# Patient Record
Sex: FEMALE | Race: BLACK OR AFRICAN AMERICAN | Employment: OTHER
[De-identification: names, ages, dates, MRNs, and addresses within clinical notes are randomized per-mention and may not be internally consistent; named-entity substitution may affect disease eponyms.]

---

## 2024-08-16 ENCOUNTER — APPOINTMENT (OUTPATIENT)
Facility: HOSPITAL | Age: 89
DRG: 669 | End: 2024-08-16
Payer: MEDICARE

## 2024-08-16 ENCOUNTER — HOSPITAL ENCOUNTER (INPATIENT)
Facility: HOSPITAL | Age: 89
LOS: 8 days | Discharge: NURSING, REHAB OR LONG-TERM CARE | DRG: 669 | End: 2024-08-26
Attending: STUDENT IN AN ORGANIZED HEALTH CARE EDUCATION/TRAINING PROGRAM | Admitting: GENERAL ACUTE CARE HOSPITAL
Payer: MEDICARE

## 2024-08-16 DIAGNOSIS — E86.0 DEHYDRATION: ICD-10-CM

## 2024-08-16 DIAGNOSIS — N30.00 ACUTE CYSTITIS WITHOUT HEMATURIA: ICD-10-CM
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
## 2024-08-16 PROBLEM — N39.0 UTI (URINARY TRACT INFECTION): Status: ACTIVE | Noted: 2024-08-16

## 2024-08-16 LAB
ALBUMIN SERPL-MCNC: 2.7 G/DL (ref 3.5–5)
ALBUMIN/GLOB SERPL: 0.8 (ref 1.1–2.2)
ALP SERPL-CCNC: 106 U/L (ref 45–117)
ALT SERPL-CCNC: 38 U/L (ref 12–78)
ANION GAP SERPL CALC-SCNC: 7 MMOL/L (ref 5–15)
APPEARANCE UR: ABNORMAL
AST SERPL-CCNC: 42 U/L (ref 15–37)
BACTERIA URNS QL MICRO: ABNORMAL /HPF
BASOPHILS # BLD: 0 K/UL (ref 0–0.1)
BASOPHILS NFR BLD: 0 % (ref 0–1)
BILIRUB SERPL-MCNC: 1 MG/DL (ref 0.2–1)
BILIRUB UR QL: NEGATIVE
BUN SERPL-MCNC: 12 MG/DL (ref 6–20)
BUN/CREAT SERPL: 17 (ref 12–20)
CALCIUM SERPL-MCNC: 8.8 MG/DL (ref 8.5–10.1)
CHLORIDE SERPL-SCNC: 102 MMOL/L (ref 97–108)
CK SERPL-CCNC: 492 U/L (ref 26–192)
CO2 SERPL-SCNC: 26 MMOL/L (ref 21–32)
COLOR UR: ABNORMAL
CREAT SERPL-MCNC: 0.72 MG/DL (ref 0.55–1.02)
DIFFERENTIAL METHOD BLD: NORMAL
EOSINOPHIL # BLD: 0.2 K/UL (ref 0–0.4)
EOSINOPHIL NFR BLD: 2 % (ref 0–7)
EPITH CASTS URNS QL MICRO: ABNORMAL /LPF
ERYTHROCYTE [DISTWIDTH] IN BLOOD BY AUTOMATED COUNT: 14.1 % (ref 11.5–14.5)
GLOBULIN SER CALC-MCNC: 3.4 G/DL (ref 2–4)
GLUCOSE SERPL-MCNC: 102 MG/DL (ref 65–100)
GLUCOSE UR STRIP.AUTO-MCNC: NEGATIVE MG/DL
HCT VFR BLD AUTO: 41.2 % (ref 35–47)
HGB BLD-MCNC: 13.5 G/DL (ref 11.5–16)
HGB UR QL STRIP: ABNORMAL
IMM GRANULOCYTES # BLD AUTO: 0 K/UL (ref 0–0.04)
IMM GRANULOCYTES NFR BLD AUTO: 0 % (ref 0–0.5)
KETONES UR QL STRIP.AUTO: NEGATIVE MG/DL
LEUKOCYTE ESTERASE UR QL STRIP.AUTO: ABNORMAL
LYMPHOCYTES # BLD: 1.3 K/UL (ref 0.8–3.5)
LYMPHOCYTES NFR BLD: 13 % (ref 12–49)
MAGNESIUM SERPL-MCNC: 2.4 MG/DL (ref 1.6–2.4)
MCH RBC QN AUTO: 28.9 PG (ref 26–34)
MCHC RBC AUTO-ENTMCNC: 32.8 G/DL (ref 30–36.5)
MCV RBC AUTO: 88.2 FL (ref 80–99)
MONOCYTES # BLD: 1 K/UL (ref 0–1)
MONOCYTES NFR BLD: 11 % (ref 5–13)
NEUTS SEG # BLD: 7.2 K/UL (ref 1.8–8)
NEUTS SEG NFR BLD: 74 % (ref 32–75)
NITRITE UR QL STRIP.AUTO: POSITIVE
NRBC # BLD: 0 K/UL (ref 0–0.01)
NRBC BLD-RTO: 0 PER 100 WBC
PH UR STRIP: 5.5 (ref 5–8)
PLATELET # BLD AUTO: 233 K/UL (ref 150–400)
PMV BLD AUTO: 9 FL (ref 8.9–12.9)
POTASSIUM SERPL-SCNC: 3.9 MMOL/L (ref 3.5–5.1)
PROT SERPL-MCNC: 6.1 G/DL (ref 6.4–8.2)
PROT UR STRIP-MCNC: 30 MG/DL
RBC # BLD AUTO: 4.67 M/UL (ref 3.8–5.2)
RBC #/AREA URNS HPF: ABNORMAL /HPF (ref 0–5)
SODIUM SERPL-SCNC: 135 MMOL/L (ref 136–145)
SP GR UR REFRACTOMETRY: 1.01
URINE CULTURE IF INDICATED: ABNORMAL
UROBILINOGEN UR QL STRIP.AUTO: 1 EU/DL (ref 0.2–1)
WBC # BLD AUTO: 9.8 K/UL (ref 3.6–11)
WBC URNS QL MICRO: ABNORMAL /HPF (ref 0–4)

## 2024-08-16 PROCEDURE — 2580000003 HC RX 258: Performed by: INTERNAL MEDICINE

## 2024-08-16 PROCEDURE — 85025 COMPLETE CBC W/AUTO DIFF WBC: CPT

## 2024-08-16 PROCEDURE — 87186 SC STD MICRODIL/AGAR DIL: CPT

## 2024-08-16 PROCEDURE — 2580000003 HC RX 258: Performed by: STUDENT IN AN ORGANIZED HEALTH CARE EDUCATION/TRAINING PROGRAM

## 2024-08-16 PROCEDURE — 87086 URINE CULTURE/COLONY COUNT: CPT

## 2024-08-16 PROCEDURE — 6360000002 HC RX W HCPCS: Performed by: INTERNAL MEDICINE

## 2024-08-16 PROCEDURE — 96372 THER/PROPH/DIAG INJ SC/IM: CPT

## 2024-08-16 PROCEDURE — 83735 ASSAY OF MAGNESIUM: CPT

## 2024-08-16 PROCEDURE — 80053 COMPREHEN METABOLIC PANEL: CPT

## 2024-08-16 PROCEDURE — 6370000000 HC RX 637 (ALT 250 FOR IP): Performed by: INTERNAL MEDICINE

## 2024-08-16 PROCEDURE — 96365 THER/PROPH/DIAG IV INF INIT: CPT

## 2024-08-16 PROCEDURE — 96361 HYDRATE IV INFUSION ADD-ON: CPT

## 2024-08-16 PROCEDURE — G0378 HOSPITAL OBSERVATION PER HR: HCPCS

## 2024-08-16 PROCEDURE — 87088 URINE BACTERIA CULTURE: CPT

## 2024-08-16 PROCEDURE — 81001 URINALYSIS AUTO W/SCOPE: CPT

## 2024-08-16 PROCEDURE — 71045 X-RAY EXAM CHEST 1 VIEW: CPT

## 2024-08-16 PROCEDURE — 70450 CT HEAD/BRAIN W/O DYE: CPT

## 2024-08-16 PROCEDURE — 99285 EMERGENCY DEPT VISIT HI MDM: CPT

## 2024-08-16 PROCEDURE — 96366 THER/PROPH/DIAG IV INF ADDON: CPT

## 2024-08-16 PROCEDURE — 6360000002 HC RX W HCPCS: Performed by: STUDENT IN AN ORGANIZED HEALTH CARE EDUCATION/TRAINING PROGRAM

## 2024-08-16 PROCEDURE — 82550 ASSAY OF CK (CPK): CPT

## 2024-08-16 PROCEDURE — 36415 COLL VENOUS BLD VENIPUNCTURE: CPT

## 2024-08-16 RX ORDER — SODIUM CHLORIDE 9 MG/ML
INJECTION, SOLUTION INTRAVENOUS PRN
Status: DISCONTINUED | OUTPATIENT
Start: 2024-08-16 | End: 2024-08-26 | Stop reason: HOSPADM

## 2024-08-16 RX ORDER — ACETAMINOPHEN 325 MG/1
650 TABLET ORAL EVERY 4 HOURS PRN
Status: DISCONTINUED | OUTPATIENT
Start: 2024-08-16 | End: 2024-08-16

## 2024-08-16 RX ORDER — ENOXAPARIN SODIUM 100 MG/ML
40 INJECTION SUBCUTANEOUS DAILY
Status: DISCONTINUED | OUTPATIENT
Start: 2024-08-16 | End: 2024-08-26 | Stop reason: HOSPADM

## 2024-08-16 RX ORDER — CEFDINIR 300 MG/1
300 CAPSULE ORAL 2 TIMES DAILY
Qty: 14 CAPSULE | Refills: 0 | Status: SHIPPED | OUTPATIENT
Start: 2024-08-16 | End: 2024-08-26 | Stop reason: HOSPADM

## 2024-08-16 RX ORDER — ONDANSETRON 2 MG/ML
4 INJECTION INTRAMUSCULAR; INTRAVENOUS EVERY 6 HOURS PRN
Status: DISCONTINUED | OUTPATIENT
Start: 2024-08-16 | End: 2024-08-26 | Stop reason: HOSPADM

## 2024-08-16 RX ORDER — ONDANSETRON 2 MG/ML
4 INJECTION INTRAMUSCULAR; INTRAVENOUS EVERY 4 HOURS PRN
Status: DISCONTINUED | OUTPATIENT
Start: 2024-08-16 | End: 2024-08-16

## 2024-08-16 RX ORDER — POLYETHYLENE GLYCOL 3350 17 G/17G
17 POWDER, FOR SOLUTION ORAL DAILY PRN
Status: DISCONTINUED | OUTPATIENT
Start: 2024-08-16 | End: 2024-08-26 | Stop reason: HOSPADM

## 2024-08-16 RX ORDER — ONDANSETRON 4 MG/1
4 TABLET, ORALLY DISINTEGRATING ORAL EVERY 8 HOURS PRN
Status: DISCONTINUED | OUTPATIENT
Start: 2024-08-16 | End: 2024-08-26 | Stop reason: HOSPADM

## 2024-08-16 RX ORDER — 0.9 % SODIUM CHLORIDE 0.9 %
1000 INTRAVENOUS SOLUTION INTRAVENOUS ONCE
Status: COMPLETED | OUTPATIENT
Start: 2024-08-16 | End: 2024-08-16

## 2024-08-16 RX ORDER — SODIUM CHLORIDE 0.9 % (FLUSH) 0.9 %
5-40 SYRINGE (ML) INJECTION EVERY 12 HOURS SCHEDULED
Status: DISCONTINUED | OUTPATIENT
Start: 2024-08-16 | End: 2024-08-26 | Stop reason: HOSPADM

## 2024-08-16 RX ORDER — ACETAMINOPHEN 325 MG/1
650 TABLET ORAL EVERY 6 HOURS PRN
Status: DISCONTINUED | OUTPATIENT
Start: 2024-08-16 | End: 2024-08-26 | Stop reason: HOSPADM

## 2024-08-16 RX ORDER — ACETAMINOPHEN 650 MG/1
650 SUPPOSITORY RECTAL EVERY 6 HOURS PRN
Status: DISCONTINUED | OUTPATIENT
Start: 2024-08-16 | End: 2024-08-26 | Stop reason: HOSPADM

## 2024-08-16 RX ORDER — SODIUM CHLORIDE 9 MG/ML
INJECTION, SOLUTION INTRAVENOUS CONTINUOUS
Status: DISCONTINUED | OUTPATIENT
Start: 2024-08-16 | End: 2024-08-20

## 2024-08-16 RX ORDER — SODIUM CHLORIDE 9 MG/ML
INJECTION, SOLUTION INTRAVENOUS CONTINUOUS
Status: DISCONTINUED | OUTPATIENT
Start: 2024-08-16 | End: 2024-08-16

## 2024-08-16 RX ORDER — LISINOPRIL 10 MG/1
10 TABLET ORAL DAILY
COMMUNITY

## 2024-08-16 RX ORDER — SODIUM CHLORIDE 0.9 % (FLUSH) 0.9 %
5-40 SYRINGE (ML) INJECTION PRN
Status: DISCONTINUED | OUTPATIENT
Start: 2024-08-16 | End: 2024-08-26 | Stop reason: HOSPADM

## 2024-08-16 RX ORDER — LISINOPRIL 5 MG/1
10 TABLET ORAL DAILY
Status: DISCONTINUED | OUTPATIENT
Start: 2024-08-16 | End: 2024-08-25

## 2024-08-16 RX ADMIN — SODIUM CHLORIDE 1000 ML: 9 INJECTION, SOLUTION INTRAVENOUS at 14:54

## 2024-08-16 RX ADMIN — CEFTRIAXONE 1000 MG: 1 INJECTION, POWDER, FOR SOLUTION INTRAMUSCULAR; INTRAVENOUS at 17:30

## 2024-08-16 RX ADMIN — SODIUM CHLORIDE: 9 INJECTION, SOLUTION INTRAVENOUS at 20:17

## 2024-08-16 RX ADMIN — SODIUM CHLORIDE, PRESERVATIVE FREE 10 ML: 5 INJECTION INTRAVENOUS at 22:38

## 2024-08-16 RX ADMIN — LISINOPRIL 10 MG: 5 TABLET ORAL at 20:17

## 2024-08-16 RX ADMIN — ENOXAPARIN SODIUM 40 MG: 100 INJECTION SUBCUTANEOUS at 20:18

## 2024-08-16 ASSESSMENT — PAIN - FUNCTIONAL ASSESSMENT: PAIN_FUNCTIONAL_ASSESSMENT: NONE - DENIES PAIN

## 2024-08-16 ASSESSMENT — LIFESTYLE VARIABLES
HOW OFTEN DO YOU HAVE A DRINK CONTAINING ALCOHOL: 4 OR MORE TIMES A WEEK
HOW MANY STANDARD DRINKS CONTAINING ALCOHOL DO YOU HAVE ON A TYPICAL DAY: 3 OR 4

## 2024-08-16 NOTE — ED PROVIDER NOTES
Leukocyte Esterase, Urine MODERATE (A) NEG      WBC, UA 20-50 0 - 4 /hpf    RBC, UA 10-20 0 - 5 /hpf    Epithelial Cells, UA MODERATE (A) FEW /lpf    BACTERIA, URINE 4+ (A) NEG /hpf    Urine Culture if Indicated URINE CULTURE ORDERED (A) CNI          RADIOLOGY:  Non-plain film images such as CT, Ultrasound and MRI are read by the radiologist. Plain radiographic images are visualized and preliminarily interpreted by the ED Provider with the below findings:        Interpretation per the Radiologist below, if available at the time of this note:     XR CHEST PORTABLE   Final Result   1. The lungs are clear      Electronically signed by YANELIS BILLY      CT Head W/O Contrast   Final Result   No evidence of acute intracranial abnormality.            Electronically signed by HUNTER WOLFE                   EMERGENCY DEPARTMENT COURSE and DIFFERENTIAL DIAGNOSIS/MDM   Vitals:    Vitals:    08/16/24 1645 08/16/24 1700 08/16/24 1715 08/16/24 1730   BP: 125/62 124/64 124/60 (!) 164/78   Pulse:       Resp:       Temp:       TempSrc:       SpO2: 99% 98% 95% 99%   Weight:       Height:                Patient was given the following medications:  Medications   0.9 % sodium chloride infusion (has no administration in time range)   acetaminophen (TYLENOL) tablet 650 mg (has no administration in time range)   ondansetron (ZOFRAN) injection 4 mg (has no administration in time range)   sodium chloride 0.9 % bolus 1,000 mL (0 mLs IntraVENous Stopped 8/16/24 1619)   cefTRIAXone (ROCEPHIN) 1,000 mg in sodium chloride 0.9 % 50 mL IVPB (mini-bag) (1,000 mg IntraVENous New Bag 8/16/24 1730)       CONSULTS: (Who and What was discussed)  None      Chronic Conditions: Hypertension    Records Reviewed (source and summary of external notes): Nursing Notes    CC/HPI Summary, DDx, ED Course, and Reassessment:   Mdm  89-year-old female who presents for evaluation of fall, inability to stand, generalized weakness.  Presents by EMS from home.

## 2024-08-16 NOTE — ED NOTES
Report received from KARIME Barry. Reviewed reason for patient arrival, vitals, labs, medications, orders, procedures, results, pending orders/results and current plan for disposition. Questions were asked and answered prior to departure.

## 2024-08-16 NOTE — H&P
Hospitalist Admission Note    NAME:   Ronna Arzola   : 1935   MRN: [id number]     Date/Time: 2024 6:44 PM    Patient PCP: Carmen Aguero MD    ______________________________________________________________________  Given the patient's current clinical presentation, I have a high level of concern for decompensation if discharged from the emergency department.  Complex decision making was performed, which includes reviewing the patient's available past medical records, laboratory results, and x-ray films.       My assessment of this patient's clinical condition and my plan of care is as follows.    Assessment / Plan:      UTI  -Urine analysis is normal.  Send urine culture.  Start ceftriaxone.  Adjust antibiotics based on urine culture results.    Acute Rhabdomyolysis  Gen Weakness  -Start IV fluids.  Recheck CK.  Check TSH, vitamin B12 level as well  -Consult PT OT  -CT head shows no acute process    Hypertension  -Continue lisinopril      Medical Decision Making:   I personally reviewed labs: CBC, BMP  I personally reviewed imaging: CT head  I personally reviewed EKG:  Toxic drug monitoring:   Discussed case with: ED provider. After discussion I am in agreement that acuity of patient's medical condition necessitates hospital stay.      Code Status: Full code  DVT Prophylaxis: Lovenox  Baseline:     Subjective:   CHIEF COMPLAINT: Generalized weakness    HISTORY OF PRESENT ILLNESS:     Ronna Arzola is a 89 y.o.  female with PMHx significant for hypertension is coming the hospital chief complaints of generalized weakness.  Patient reports being in her usual health until about a week ago when she started having some generalized weakness along with difficulty ambulating.  Does not report any chest pain or shortness of breath.  Denies any abdominal pain, nausea vomiting.  No fever or chills.    On arrival to ED, she was noted to have stable vitals but was noted to have abnormal urinalysis and was  subcortical white matter hypodensity consistent with chronic small vessel ischemic disease. There is no intracranial hemorrhage, extra-axial collection, or mass effect. The basilar cisterns are open. No CT evidence of acute infarct. The bone windows demonstrate no abnormalities. The visualized portions of the paranasal sinuses and mastoid air cells are clear.     No evidence of acute intracranial abnormality. Electronically signed by HUNTER WOLFE     _______________________________________________________________________    TOTAL TIME:  76 Minutes    Critical Care Provided     Minutes non procedure based    Signed: Wilmer Lagunas MD    Procedures: see electronic medical records for all procedures/Xrays and details which were not copied into this note but were reviewed prior to creation of Plan.

## 2024-08-17 LAB
25(OH)D3 SERPL-MCNC: <9 NG/ML (ref 30–100)
ANION GAP SERPL CALC-SCNC: 6 MMOL/L (ref 5–15)
BASOPHILS # BLD: 0.1 K/UL (ref 0–0.1)
BASOPHILS NFR BLD: 1 % (ref 0–1)
BUN SERPL-MCNC: 10 MG/DL (ref 6–20)
BUN/CREAT SERPL: 16 (ref 12–20)
CALCIUM SERPL-MCNC: 8.3 MG/DL (ref 8.5–10.1)
CHLORIDE SERPL-SCNC: 109 MMOL/L (ref 97–108)
CO2 SERPL-SCNC: 23 MMOL/L (ref 21–32)
CREAT SERPL-MCNC: 0.63 MG/DL (ref 0.55–1.02)
DIFFERENTIAL METHOD BLD: ABNORMAL
EOSINOPHIL # BLD: 0.3 K/UL (ref 0–0.4)
EOSINOPHIL NFR BLD: 5 % (ref 0–7)
ERYTHROCYTE [DISTWIDTH] IN BLOOD BY AUTOMATED COUNT: 14 % (ref 11.5–14.5)
GLUCOSE SERPL-MCNC: 88 MG/DL (ref 65–100)
HCT VFR BLD AUTO: 41.5 % (ref 35–47)
HGB BLD-MCNC: 13.4 G/DL (ref 11.5–16)
IMM GRANULOCYTES # BLD AUTO: 0 K/UL (ref 0–0.04)
IMM GRANULOCYTES NFR BLD AUTO: 1 % (ref 0–0.5)
LYMPHOCYTES # BLD: 1.5 K/UL (ref 0.8–3.5)
LYMPHOCYTES NFR BLD: 20 % (ref 12–49)
MAGNESIUM SERPL-MCNC: 2.4 MG/DL (ref 1.6–2.4)
MCH RBC QN AUTO: 29 PG (ref 26–34)
MCHC RBC AUTO-ENTMCNC: 32.3 G/DL (ref 30–36.5)
MCV RBC AUTO: 89.8 FL (ref 80–99)
MONOCYTES # BLD: 0.9 K/UL (ref 0–1)
MONOCYTES NFR BLD: 12 % (ref 5–13)
NEUTS SEG # BLD: 4.7 K/UL (ref 1.8–8)
NEUTS SEG NFR BLD: 61 % (ref 32–75)
NRBC # BLD: 0 K/UL (ref 0–0.01)
NRBC BLD-RTO: 0 PER 100 WBC
PLATELET # BLD AUTO: 214 K/UL (ref 150–400)
PMV BLD AUTO: 9.5 FL (ref 8.9–12.9)
POTASSIUM SERPL-SCNC: 3.5 MMOL/L (ref 3.5–5.1)
RBC # BLD AUTO: 4.62 M/UL (ref 3.8–5.2)
SODIUM SERPL-SCNC: 138 MMOL/L (ref 136–145)
TSH SERPL DL<=0.05 MIU/L-ACNC: 0.77 UIU/ML (ref 0.36–3.74)
VIT B12 SERPL-MCNC: 184 PG/ML (ref 193–986)
WBC # BLD AUTO: 7.5 K/UL (ref 3.6–11)

## 2024-08-17 PROCEDURE — 82607 VITAMIN B-12: CPT

## 2024-08-17 PROCEDURE — 82306 VITAMIN D 25 HYDROXY: CPT

## 2024-08-17 PROCEDURE — 2580000003 HC RX 258: Performed by: INTERNAL MEDICINE

## 2024-08-17 PROCEDURE — 96372 THER/PROPH/DIAG INJ SC/IM: CPT

## 2024-08-17 PROCEDURE — 84443 ASSAY THYROID STIM HORMONE: CPT

## 2024-08-17 PROCEDURE — 85025 COMPLETE CBC W/AUTO DIFF WBC: CPT

## 2024-08-17 PROCEDURE — 6370000000 HC RX 637 (ALT 250 FOR IP): Performed by: INTERNAL MEDICINE

## 2024-08-17 PROCEDURE — 96366 THER/PROPH/DIAG IV INF ADDON: CPT

## 2024-08-17 PROCEDURE — 6360000002 HC RX W HCPCS: Performed by: INTERNAL MEDICINE

## 2024-08-17 PROCEDURE — 96361 HYDRATE IV INFUSION ADD-ON: CPT

## 2024-08-17 PROCEDURE — 83735 ASSAY OF MAGNESIUM: CPT

## 2024-08-17 PROCEDURE — 80048 BASIC METABOLIC PNL TOTAL CA: CPT

## 2024-08-17 PROCEDURE — 36415 COLL VENOUS BLD VENIPUNCTURE: CPT

## 2024-08-17 PROCEDURE — G0378 HOSPITAL OBSERVATION PER HR: HCPCS

## 2024-08-17 RX ORDER — VITAMIN B COMPLEX
5000 TABLET ORAL DAILY
Status: DISCONTINUED | OUTPATIENT
Start: 2024-08-18 | End: 2024-08-26 | Stop reason: HOSPADM

## 2024-08-17 RX ORDER — UREA 10 %
1000 LOTION (ML) TOPICAL DAILY
Status: DISCONTINUED | OUTPATIENT
Start: 2024-08-17 | End: 2024-08-18

## 2024-08-17 RX ADMIN — SODIUM CHLORIDE, PRESERVATIVE FREE 10 ML: 5 INJECTION INTRAVENOUS at 08:55

## 2024-08-17 RX ADMIN — SODIUM CHLORIDE, PRESERVATIVE FREE 10 ML: 5 INJECTION INTRAVENOUS at 22:32

## 2024-08-17 RX ADMIN — LISINOPRIL 10 MG: 5 TABLET ORAL at 08:51

## 2024-08-17 RX ADMIN — SODIUM CHLORIDE 1000 MG: 900 INJECTION INTRAVENOUS at 00:14

## 2024-08-17 RX ADMIN — SODIUM CHLORIDE: 9 INJECTION, SOLUTION INTRAVENOUS at 19:27

## 2024-08-17 RX ADMIN — ENOXAPARIN SODIUM 40 MG: 100 INJECTION SUBCUTANEOUS at 08:50

## 2024-08-17 RX ADMIN — CYANOCOBALAMIN TAB 500 MCG 1000 MCG: 500 TAB at 11:15

## 2024-08-17 NOTE — PLAN OF CARE
Problem: Safety - Adult  Goal: Free from fall injury  8/17/2024 1051 by Samantha Jain RN  Outcome: Progressing  8/17/2024 0208 by Gabby Quarles RN  Outcome: Progressing     Problem: Discharge Planning  Goal: Discharge to home or other facility with appropriate resources  8/17/2024 1051 by Samantha Jain RN  Outcome: Progressing  8/17/2024 0208 by Gabby Quarles RN  Outcome: Progressing     Problem: ABCDS Injury Assessment  Goal: Absence of physical injury  Outcome: Progressing

## 2024-08-17 NOTE — PROGRESS NOTES
Physical Therapy  Orders received and acknowledged.  Medical record reviewed.  Will defer at this time;  there is no H&P in the chart.  Will continue to follow as appropriate.

## 2024-08-17 NOTE — PROGRESS NOTES
Patient told us she \"sneezed and blood came out\" Assessed medium amount of bright red blood on the floor. Pt denies dizziness and pain. Assessed pt's rectum and perineum. Noticed blood on inner thighs. Cleaned up pt. MD notified. Educated pt to let us know if this happens again.

## 2024-08-17 NOTE — PROGRESS NOTES
Occupational Therapy  Orders received and acknowledged.  Medical record reviewed.  Will defer at this time;  there is no H&P in the chart.  Will continue to follow as appropriate.

## 2024-08-17 NOTE — ED NOTES
Report given to KARIME Pierre. Nurse was informed of reason for arrival, vitals, labs, medications, orders, procedures, results, anything left pending and current plan of action. Questions were asked and received prior to departure from the patient.

## 2024-08-18 ENCOUNTER — APPOINTMENT (OUTPATIENT)
Facility: HOSPITAL | Age: 89
DRG: 669 | End: 2024-08-18
Payer: MEDICARE

## 2024-08-18 PROBLEM — E53.8 B12 DEFICIENCY: Status: ACTIVE | Noted: 2024-08-18

## 2024-08-18 LAB
ANION GAP SERPL CALC-SCNC: 6 MMOL/L (ref 5–15)
BACTERIA SPEC CULT: ABNORMAL
BACTERIA SPEC CULT: ABNORMAL
BASOPHILS # BLD: 0 K/UL (ref 0–0.1)
BASOPHILS NFR BLD: 0 % (ref 0–1)
BUN SERPL-MCNC: 6 MG/DL (ref 6–20)
BUN/CREAT SERPL: 11 (ref 12–20)
CALCIUM SERPL-MCNC: 8.1 MG/DL (ref 8.5–10.1)
CC UR VC: ABNORMAL
CHLORIDE SERPL-SCNC: 109 MMOL/L (ref 97–108)
CK SERPL-CCNC: 153 U/L (ref 26–192)
CO2 SERPL-SCNC: 26 MMOL/L (ref 21–32)
CREAT SERPL-MCNC: 0.57 MG/DL (ref 0.55–1.02)
DIFFERENTIAL METHOD BLD: ABNORMAL
EOSINOPHIL # BLD: 0.2 K/UL (ref 0–0.4)
EOSINOPHIL NFR BLD: 3 % (ref 0–7)
ERYTHROCYTE [DISTWIDTH] IN BLOOD BY AUTOMATED COUNT: 14.2 % (ref 11.5–14.5)
FOLATE SERPL-MCNC: 7.9 NG/ML (ref 5–21)
GLUCOSE BLD STRIP.AUTO-MCNC: 138 MG/DL (ref 65–117)
GLUCOSE SERPL-MCNC: 114 MG/DL (ref 65–100)
HCT VFR BLD AUTO: 39 % (ref 35–47)
HGB BLD-MCNC: 12.3 G/DL (ref 11.5–16)
IMM GRANULOCYTES # BLD AUTO: 0 K/UL (ref 0–0.04)
IMM GRANULOCYTES NFR BLD AUTO: 1 % (ref 0–0.5)
LYMPHOCYTES # BLD: 1.4 K/UL (ref 0.8–3.5)
LYMPHOCYTES NFR BLD: 19 % (ref 12–49)
MCH RBC QN AUTO: 28.7 PG (ref 26–34)
MCHC RBC AUTO-ENTMCNC: 31.5 G/DL (ref 30–36.5)
MCV RBC AUTO: 90.9 FL (ref 80–99)
MONOCYTES # BLD: 1 K/UL (ref 0–1)
MONOCYTES NFR BLD: 14 % (ref 5–13)
NEUTS SEG # BLD: 4.5 K/UL (ref 1.8–8)
NEUTS SEG NFR BLD: 63 % (ref 32–75)
NRBC # BLD: 0 K/UL (ref 0–0.01)
NRBC BLD-RTO: 0 PER 100 WBC
PLATELET # BLD AUTO: 222 K/UL (ref 150–400)
PMV BLD AUTO: 9.5 FL (ref 8.9–12.9)
POTASSIUM SERPL-SCNC: 3.4 MMOL/L (ref 3.5–5.1)
RBC # BLD AUTO: 4.29 M/UL (ref 3.8–5.2)
SERVICE CMNT-IMP: ABNORMAL
SERVICE CMNT-IMP: ABNORMAL
SODIUM SERPL-SCNC: 141 MMOL/L (ref 136–145)
WBC # BLD AUTO: 7.1 K/UL (ref 3.6–11)

## 2024-08-18 PROCEDURE — 6370000000 HC RX 637 (ALT 250 FOR IP): Performed by: INTERNAL MEDICINE

## 2024-08-18 PROCEDURE — 82550 ASSAY OF CK (CPK): CPT

## 2024-08-18 PROCEDURE — 96361 HYDRATE IV INFUSION ADD-ON: CPT

## 2024-08-18 PROCEDURE — 74178 CT ABD&PLV WO CNTR FLWD CNTR: CPT

## 2024-08-18 PROCEDURE — 96372 THER/PROPH/DIAG INJ SC/IM: CPT

## 2024-08-18 PROCEDURE — 6360000002 HC RX W HCPCS: Performed by: INTERNAL MEDICINE

## 2024-08-18 PROCEDURE — 80048 BASIC METABOLIC PNL TOTAL CA: CPT

## 2024-08-18 PROCEDURE — 6370000000 HC RX 637 (ALT 250 FOR IP): Performed by: GENERAL ACUTE CARE HOSPITAL

## 2024-08-18 PROCEDURE — 36415 COLL VENOUS BLD VENIPUNCTURE: CPT

## 2024-08-18 PROCEDURE — G0378 HOSPITAL OBSERVATION PER HR: HCPCS

## 2024-08-18 PROCEDURE — 82746 ASSAY OF FOLIC ACID SERUM: CPT

## 2024-08-18 PROCEDURE — 1100000000 HC RM PRIVATE

## 2024-08-18 PROCEDURE — 82962 GLUCOSE BLOOD TEST: CPT

## 2024-08-18 PROCEDURE — 97116 GAIT TRAINING THERAPY: CPT

## 2024-08-18 PROCEDURE — 2580000003 HC RX 258: Performed by: INTERNAL MEDICINE

## 2024-08-18 PROCEDURE — 97165 OT EVAL LOW COMPLEX 30 MIN: CPT

## 2024-08-18 PROCEDURE — 96366 THER/PROPH/DIAG IV INF ADDON: CPT

## 2024-08-18 PROCEDURE — 97535 SELF CARE MNGMENT TRAINING: CPT

## 2024-08-18 PROCEDURE — 6360000004 HC RX CONTRAST MEDICATION: Performed by: GENERAL ACUTE CARE HOSPITAL

## 2024-08-18 PROCEDURE — 85025 COMPLETE CBC W/AUTO DIFF WBC: CPT

## 2024-08-18 PROCEDURE — 97161 PT EVAL LOW COMPLEX 20 MIN: CPT

## 2024-08-18 RX ORDER — POTASSIUM CHLORIDE 750 MG/1
40 TABLET, EXTENDED RELEASE ORAL ONCE
Status: COMPLETED | OUTPATIENT
Start: 2024-08-18 | End: 2024-08-18

## 2024-08-18 RX ORDER — UREA 10 %
1000 LOTION (ML) TOPICAL DAILY
Status: DISCONTINUED | OUTPATIENT
Start: 2024-08-22 | End: 2024-08-26 | Stop reason: HOSPADM

## 2024-08-18 RX ORDER — CYANOCOBALAMIN 1000 UG/ML
1000 INJECTION, SOLUTION INTRAMUSCULAR; SUBCUTANEOUS DAILY
Status: COMPLETED | OUTPATIENT
Start: 2024-08-19 | End: 2024-08-21

## 2024-08-18 RX ORDER — IOPAMIDOL 755 MG/ML
100 INJECTION, SOLUTION INTRAVASCULAR
Status: COMPLETED | OUTPATIENT
Start: 2024-08-18 | End: 2024-08-18

## 2024-08-18 RX ADMIN — SODIUM CHLORIDE 1000 MG: 900 INJECTION INTRAVENOUS at 00:30

## 2024-08-18 RX ADMIN — SODIUM CHLORIDE: 9 INJECTION, SOLUTION INTRAVENOUS at 04:38

## 2024-08-18 RX ADMIN — CYANOCOBALAMIN TAB 500 MCG 1000 MCG: 500 TAB at 09:38

## 2024-08-18 RX ADMIN — SODIUM CHLORIDE, PRESERVATIVE FREE 10 ML: 5 INJECTION INTRAVENOUS at 22:05

## 2024-08-18 RX ADMIN — Medication 5000 UNITS: at 09:43

## 2024-08-18 RX ADMIN — LISINOPRIL 10 MG: 5 TABLET ORAL at 09:38

## 2024-08-18 RX ADMIN — IOPAMIDOL 100 ML: 755 INJECTION, SOLUTION INTRAVENOUS at 11:31

## 2024-08-18 RX ADMIN — SODIUM CHLORIDE, PRESERVATIVE FREE 10 ML: 5 INJECTION INTRAVENOUS at 09:52

## 2024-08-18 RX ADMIN — ENOXAPARIN SODIUM 40 MG: 100 INJECTION SUBCUTANEOUS at 09:38

## 2024-08-18 RX ADMIN — POTASSIUM CHLORIDE 40 MEQ: 750 TABLET, FILM COATED, EXTENDED RELEASE ORAL at 12:50

## 2024-08-18 NOTE — PLAN OF CARE
Index. [facility] Med J 142.  -RACQUEL Juarez, SHANEL Jack (1997). The Barthel activities of daily living index: self-reporting versus actual performance in the old (> or = 75 years). Journal of American Geriatric Society 45(7), 832-836.   -DEREK Martinez, CIRA Anna., ONELIA Iyer., Francisco, EZEQUIEL. (1999). Measuring the change in disability after inpatient rehabilitation; comparison of the responsiveness of the Barthel Index and Functional Champion Measure. Journal of Neurology, Neurosurgery, and Psychiatry, 66(4), 480-484.  -TESFAYE Rizo, CONSTANTINO Liu, & Phi MBREANA. (2004) Assessment of post-stroke quality of life in cost-effectiveness studies: The usefulness of the Barthel Index and the EuroQoL-5D. Quality of Life Research, 13, 427-43                                                                                                                                                                                                                                 Activity Tolerance:   Good, Fair , and requires rest breaks    After treatment:   Patient left in no apparent distress sitting up in chair, Call bell within reach, and Bed/ chair alarm activated    COMMUNICATION/EDUCATION:   The patient's plan of care was discussed with: physical therapist, registered nurse, and     Patient Education  Education Given To: Patient  Education Provided: Role of Therapy;Plan of Care;Precautions;ADL Adaptive Strategies;Transfer Training;Energy Conservation  Education Method: Demonstration;Verbal  Barriers to Learning: None  Education Outcome: Verbalized understanding;Demonstrated understanding    Thank you for this referral.  Matthew Kerley, OT  Minutes: 19    Occupational Therapy Evaluation Charge Determination   History Examination Decision-Making   LOW Complexity : Brief history review  LOW Complexity: 1-3 Performance deficits relating to physical, cognitive, or psychosocial skills that result

## 2024-08-18 NOTE — CONSULTS
[facility]              [address] [place] [address]                              CONSULTATION      PATIENT NAME: BRADY INFANTE               : 1935  MED REC NO: [id number]                       ROOM: 3219  ACCOUNT NO: [id number]                       ADMIT DATE: 2024  PROVIDER: Dixon Byrne MD    DATE OF SERVICE:  2024    ATTENDING PHYSICIAN:  BEAU STOCK    REASON FOR CONSULTATION:  Gross hematuria, UTI.    HISTORY OF PRESENT ILLNESS:  The patient is an 89-year-old female, admitted to the hospital with symptomatic UTIs, weakness, .  She said she had fallen.  Urine culture is pending, growing out gram-negative rods.  Since being admitted, she has developed gross hematuria.  She is having some mild dysuria.  No flank pain, nausea, or vomiting.  She has been afebrile.  Vital signs are unremarkable, though the blood pressure still on the slightly low side.  White blood cell count normal at 7, creatinine normal at 0.57.  She had a CT of her head that was unremarkable.  Chest x-ray is unremarkable.  She has not had any abdominal imaging done.  On questioning, the patient states that she had gross hematuria a few months ago.  I have been asked to see for the above.    PAST MEDICAL HISTORY:  Hypertension.    PAST SURGICAL HISTORY:  She reports none.    ALLERGIES:  LATEX.      FAMILY HISTORY:  Unremarkable.    SOCIAL HISTORY:  She does not smoke.    MEDICATIONS:  At present are:  1. Tylenol.  2. Ceftriaxone.  3. Lovenox.  4. Zofran.  5. MiraLAX.  6. Vitamin B12.    PHYSICAL EXAMINATION:  GENERAL:  She is alert and oriented female, sitting in her chair.  HEENT:  Unremarkable.  LUNGS:  No labored breathing.  ABDOMEN:  Soft, nontender.  BACK:  No CVA tenderness.    ASSESSMENT:  Gross hematuria.  Seems like it may have predated her urinary tract infection.  I will go ahead and get her CT IVP and follow up on that and then determine next step.

## 2024-08-18 NOTE — PROGRESS NOTES
Hospitalist Progress Note    NAME:   Ronna Arzola   : 1935   MRN: [id number]     Date/Time: 2024 12:34 PM  Patient PCP: Carmen Aguero MD    Assessment / Plan:  UTI  Hematuria, resolved    -Ddysuria and UA + for Nitrate/LE. U Cx w GNR  Continue ceftriaxone  -F/up CT abd     Elevated CK level   Gen Weakness  -Off IV fluids.     -TSH is within normal limits. CK back to normal  -Consult PT OT.    Vitamin B12 deficiency  Generalized weakness   -Start vitamin B12 supplementation  -check folic acid level    Vitamin D deficiency  -Will add vitamin D supplementation    Hypertension  -Continue lisinopril    Pt request podiatry consult for long toenails     Medical Decision Making:   I personally reviewed labs: CBC, BMP  I personally reviewed imaging:  I personally reviewed EKG:  Toxic drug monitoring:   Discussed case with: pt, RN, family     Code Status: Full code  DVT Prophylaxis: scds  GI Prophylaxis:    Subjective:     Chief Complaint / Reason for Physician Visit  No abdominal pain, nausea or vomiting  Hematuria resolved  Still reports some generalized weakness  Overnight events noted no fever      Objective:     VITALS:   Last 24hrs VS reviewed since prior progress note. Most recent are:  Patient Vitals for the past 24 hrs:   BP Temp Temp src Pulse Resp SpO2   24 1134 -- -- Oral -- -- --   24 0748 -- 98.1 °F (36.7 °C) Oral -- 20 --   24 0746 (!) 108/11 -- -- 92 -- 93 %   24 0230 (!) 127/53 98.2 °F (36.8 °C) -- 80 17 99 %   24 1955 (!) 136/52 97.7 °F (36.5 °C) Oral 62 17 100 %   24 1451 (!) 102/50 97.7 °F (36.5 °C) Oral 78 18 100 %       No intake or output data in the 24 hours ending 24 1234     I had a face to face encounter and independently examined this patient on 2024, as outlined below:  PHYSICAL EXAM:  General: Alert, cooperative  EENT:  EOMI. Anicteric sclerae.  Resp:  CTA bilaterally, no wheezing or rales.  No accessory muscle  use  CV:  Regular  rhythm,  No edema  GI:  Soft, Non distended, Non tender.  +Bowel sounds  Neurologic:  Alert and oriented X 3, normal speech,   Psych:   Good insight. Not anxious nor agitated  Skin:  No rashes.  No jaundice        Reviewed most current lab test results and cultures  YES  Reviewed most current radiology test results   YES  Review and summation of old records today    NO  Reviewed patient's current orders and MAR    YES  PMH/SH reviewed - no change compared to H&P    Procedures: see electronic medical records for all procedures/Xrays and details which were not copied into this note but were reviewed prior to creation of Plan.      LABS:  I reviewed today's most current labs and imaging studies.  Pertinent labs include:  Recent Labs     08/16/24  1449 08/17/24  0247 08/18/24  0530   WBC 9.8 7.5 7.1   HGB 13.5 13.4 12.3   HCT 41.2 41.5 39.0    214 222     Recent Labs     08/16/24  1449 08/17/24  0247 08/18/24  0530   * 138 141   K 3.9 3.5 3.4*    109* 109*   CO2 26 23 26   GLUCOSE 102* 88 114*   BUN 12 10 6   CREATININE 0.72 0.63 0.57   CALCIUM 8.8 8.3* 8.1*   MG 2.4 2.4  --    BILITOT 1.0  --   --    AST 42*  --   --    ALT 38  --   --        Signed: Barbie Sr MD

## 2024-08-18 NOTE — PROGRESS NOTES
Messaged by nursing and informed patient was experiencing hematuria. Has UA consistent with UTI on ceftriaxone. Unclear if there is been recent straight catheterization-awaiting nursing response. Nevertheless. Patient is hemodynamically stable and despite bright red appearance of urine and this is likely reflective of low volumes of bleeding. Will add on bladder checks to ensure no retention from clot. Defer need for urology consultation to daytime pending continued versus resolution of hematuria.

## 2024-08-18 NOTE — PLAN OF CARE
Problem: Safety - Adult  Goal: Free from fall injury  8/18/2024 1132 by Samantha Jain, RN  Outcome: Progressing  8/17/2024 2302 by Nelia Marcus RN  Outcome: Progressing  Flowsheets (Taken 8/17/2024 2302)  Free From Fall Injury:   Instruct family/caregiver on patient safety   Based on caregiver fall risk screen, instruct family/caregiver to ask for assistance with transferring infant if caregiver noted to have fall risk factors     Problem: Discharge Planning  Goal: Discharge to home or other facility with appropriate resources  Outcome: Progressing     Problem: ABCDS Injury Assessment  Goal: Absence of physical injury  Outcome: Progressing     Problem: Occupational Therapy - Adult  Goal: By Discharge: Performs self-care activities at highest level of function for planned discharge setting.  See evaluation for individualized goals.  Description: FUNCTIONAL STATUS PRIOR TO ADMISSION:  Patient was ambulatory using no DME.  Pt states she has SPC; however, cruises furniture within home.  Pt verbalized desires for quad cane, with PT aware and following.  Pt states he son comes/goes; however, is generally around to assist with donning socks.  Pt reports she washes at sink side.   ,  ,  ,  ,  ,  ,  ,  ,  ,  ,       HOME SUPPORT: Patient lived with son who provided PRN assist for LE dressing and IADLs.    Occupational Therapy Goals:  Initiated 8/18/2024  1.  Patient will perform quad cane grooming with Modified [place] within 7 day(s).  2.  Patient will perform bathing with Modified [place] within 7 day(s).  3.  Patient will perform quad cane lower body dressing with Modified [place] within 7 day(s).  4.  Patient will perform quad cane toilet transfers with Modified [place]  within 7 day(s).  5.  Patient will perform all aspects of quad cane toileting with Modified [place] within 7 day(s).        8/18/2024 1047 by Kerley, Matthew, OT  Outcome: Progressing

## 2024-08-18 NOTE — PLAN OF CARE
2021 Apr 4;101(4):frpf115. doi: 10.1093/ptj/[id number]. PMID: 15301359.  3. Kerry GRAY, Belen BARKER, Olga S, Guille GEORGE, Adri JESUS. Activity Measure for Post-Acute Care \"6-Clicks\" Basic Mobility Scores Predict Discharge Destination After Acute Care Hospitalization in Select Patient Groups: A Retrospective, Observational Study. Arch Rehabil Res Clin Transl. 2022 Jul 16;4(3):963175. doi: 10.1016/j.arrct.2022.742643. PMID: 69081648; PMCID: [id number].  4. Darby TERRY, Nai S, Aristides W, Carolina P. AM-PAC Short Forms Manual 4.0. Revised 2/2020.                                                                                                                                                                                                                              Pain Rating:  Did not report pain this visit   Pain Intervention(s):   rest and repositioning    Activity Tolerance:   Good and requires rest breaks    After treatment:   Patient left in no apparent distress sitting up in chair, Call bell within reach, Bed/ chair alarm activated, Heels elevated for pressure relief, and Updated patient's board on functional status and mobility recommendations    COMMUNICATION/EDUCATION:   The patient's plan of care was discussed with: occupational therapist and registered nurse    Patient Education  Education Given To: Patient  Education Provided: Role of Therapy;Plan of Care;Transfer Training;Fall Prevention Strategies  Education Method: Verbal  Barriers to Learning: Cognition  Education Outcome: Verbalized understanding;Continued education needed    Thank you for this referral.  Charisse Diamond PT, DPT, NCS  Minutes: 21      Physical Therapy Evaluation Charge Determination   History Examination Presentation Decision-Making   MEDIUM  Complexity : 1-2 comorbidities / personal factors will impact the outcome/ POC  LOW Complexity : 1-2 Standardized tests and measures addressing body structure, function, activity limitation and / or participation  in recreation  LOW Complexity : Stable, uncomplicated  AM-PAC  LOW    Based on the above components, the patient evaluation is determined to be of the following complexity level: Low

## 2024-08-18 NOTE — PROGRESS NOTES
End of Shift Note    Bedside shift change report given to Raquel SCHAFFER (oncoming nurse) by Nelia Cabral RN (offgoing nurse).  Report included the following information SBAR, Kardex, Intake/Output, MAR, Recent Results, and Med Rec Status    Shift worked:  7P-  7A     Shift summary and any significant changes:     Patient had haematuria and informed the MD, for Urology consult today, for routine bladder scan.     Concerns for physician to address:  Haematuria     Zone phone for oncoming shift:   [phone]       Activity:     Number times ambulated in hallways past shift: 1  Number of times OOB to chair past shift: 1    Cardiac:   Cardiac Monitoring: Yes           Access:  Current line(s): PIV     Genitourinary:   Urinary status: voiding    Respiratory:      Chronic home O2 use?: NO  Incentive spirometer at bedside: NO       GI:     Current diet:  ADULT DIET; Regular  Passing flatus: YES  Tolerating current diet: YES       Pain Management:   Patient states pain is manageable on current regimen: N/A    Skin:     Interventions: increase time out of bed    Patient Safety:  Fall Score:    Interventions: bed/chair alarm, gripper socks, pt to call before getting OOB, and stay with me (per policy)       Length of Stay:  Expected LOS: 2  Actual LOS: 0      Nelia Cabral RN

## 2024-08-18 NOTE — PLAN OF CARE
Problem: Safety - Adult  Goal: Free from fall injury  8/17/2024 2302 by Nelia Marcus, RN  Outcome: Progressing  Flowsheets (Taken 8/17/2024 2302)  Free From Fall Injury:   Instruct family/caregiver on patient safety   Based on caregiver fall risk screen, instruct family/caregiver to ask for assistance with transferring infant if caregiver noted to have fall risk factors  8/17/2024 1051 by Samantha Jain, RN  Outcome: Progressing

## 2024-08-18 NOTE — PROGRESS NOTES
Patient having haematuria, informed the on call, said to inform Parrish Pressley for an assessment, informed same, no pain, Bladder scan done as per MD,121 mls, for morning, CBC, and Urology review. Patient states no recent catheterizations done.

## 2024-08-18 NOTE — PROGRESS NOTES
Hospitalist Progress Note    NAME:   Ronna Arzola   : 1935   MRN: [id number]     Date/Time: 2024 10:29 PM  Patient PCP: Carmen Aguero MD    Estimated discharge date:  Barriers:       Assessment / Plan:    UTI  -Urine analysis is normal.  Pending urine culture results.  Continue ceftriaxone     Acute Rhabdomyolysis  Gen Weakness  -Continue IV fluids.  Recheck CK level in a.m. tomorrow  -TSH is within normal limits  -Consult PT OT.      Vitamin B12 deficiency  -Start vitamin B12 supplementation    Vitamin D deficiency  -Will add vitamin D supplementation      Hypertension  -Continue lisinopril             Medical Decision Making:   I personally reviewed labs: CBC, BMP  I personally reviewed imaging:  I personally reviewed EKG:  Toxic drug monitoring:   Discussed case with:         Code Status: Full code  DVT Prophylaxis: Lovenox  GI Prophylaxis:    Subjective:     Chief Complaint / Reason for Physician Visit  No abdominal pain, nausea vomiting  Still reports some generalized weakness  Overnight events noted no fever      Objective:     VITALS:   Last 24hrs VS reviewed since prior progress note. Most recent are:  Patient Vitals for the past 24 hrs:   BP Temp Temp src Pulse Resp SpO2   24 1955 (!) 136/52 97.7 °F (36.5 °C) -- 62 17 100 %   24 1451 (!) 102/50 97.7 °F (36.5 °C) Oral 78 18 100 %   24 0803 127/68 98.1 °F (36.7 °C) Oral 68 20 96 %   24 0345 128/70 98.3 °F (36.8 °C) Oral 98 18 99 %       No intake or output data in the 24 hours ending 249     I had a face to face encounter and independently examined this patient on 2024, as outlined below:  PHYSICAL EXAM:  General: Alert, cooperative  EENT:  EOMI. Anicteric sclerae.  Resp:  CTA bilaterally, no wheezing or rales.  No accessory muscle use  CV:  Regular  rhythm,  No edema  GI:  Soft, Non distended, Non tender.  +Bowel sounds  Neurologic:  Alert and oriented X 3, normal speech,   Psych:   Good

## 2024-08-19 ENCOUNTER — APPOINTMENT (OUTPATIENT)
Facility: HOSPITAL | Age: 89
DRG: 669 | End: 2024-08-19
Payer: MEDICARE

## 2024-08-19 LAB
ERYTHROCYTE [DISTWIDTH] IN BLOOD BY AUTOMATED COUNT: 14 % (ref 11.5–14.5)
HCT VFR BLD AUTO: 37 % (ref 35–47)
HGB BLD-MCNC: 11.8 G/DL (ref 11.5–16)
MCH RBC QN AUTO: 28.9 PG (ref 26–34)
MCHC RBC AUTO-ENTMCNC: 31.9 G/DL (ref 30–36.5)
MCV RBC AUTO: 90.5 FL (ref 80–99)
NRBC # BLD: 0 K/UL (ref 0–0.01)
NRBC BLD-RTO: 0 PER 100 WBC
PLATELET # BLD AUTO: 213 K/UL (ref 150–400)
PMV BLD AUTO: 9.5 FL (ref 8.9–12.9)
RBC # BLD AUTO: 4.09 M/UL (ref 3.8–5.2)
WBC # BLD AUTO: 6.7 K/UL (ref 3.6–11)

## 2024-08-19 PROCEDURE — 6360000002 HC RX W HCPCS: Performed by: GENERAL ACUTE CARE HOSPITAL

## 2024-08-19 PROCEDURE — 6360000002 HC RX W HCPCS: Performed by: INTERNAL MEDICINE

## 2024-08-19 PROCEDURE — 1100000000 HC RM PRIVATE

## 2024-08-19 PROCEDURE — 6370000000 HC RX 637 (ALT 250 FOR IP): Performed by: STUDENT IN AN ORGANIZED HEALTH CARE EDUCATION/TRAINING PROGRAM

## 2024-08-19 PROCEDURE — 71260 CT THORAX DX C+: CPT

## 2024-08-19 PROCEDURE — 2580000003 HC RX 258: Performed by: INTERNAL MEDICINE

## 2024-08-19 PROCEDURE — 85027 COMPLETE CBC AUTOMATED: CPT

## 2024-08-19 PROCEDURE — 36415 COLL VENOUS BLD VENIPUNCTURE: CPT

## 2024-08-19 PROCEDURE — 6370000000 HC RX 637 (ALT 250 FOR IP): Performed by: INTERNAL MEDICINE

## 2024-08-19 PROCEDURE — 6360000004 HC RX CONTRAST MEDICATION: Performed by: GENERAL ACUTE CARE HOSPITAL

## 2024-08-19 RX ORDER — IOPAMIDOL 755 MG/ML
100 INJECTION, SOLUTION INTRAVASCULAR
Status: COMPLETED | OUTPATIENT
Start: 2024-08-19 | End: 2024-08-19

## 2024-08-19 RX ORDER — TRAZODONE HYDROCHLORIDE 50 MG/1
50 TABLET, FILM COATED ORAL NIGHTLY PRN
Status: DISCONTINUED | OUTPATIENT
Start: 2024-08-19 | End: 2024-08-26 | Stop reason: HOSPADM

## 2024-08-19 RX ADMIN — CYANOCOBALAMIN 1000 MCG: 1000 INJECTION INTRAMUSCULAR; SUBCUTANEOUS at 09:19

## 2024-08-19 RX ADMIN — SODIUM CHLORIDE, PRESERVATIVE FREE 10 ML: 5 INJECTION INTRAVENOUS at 21:04

## 2024-08-19 RX ADMIN — SODIUM CHLORIDE, PRESERVATIVE FREE 10 ML: 5 INJECTION INTRAVENOUS at 09:20

## 2024-08-19 RX ADMIN — Medication 5000 UNITS: at 09:20

## 2024-08-19 RX ADMIN — LISINOPRIL 10 MG: 5 TABLET ORAL at 09:19

## 2024-08-19 RX ADMIN — IOPAMIDOL 100 ML: 755 INJECTION, SOLUTION INTRAVENOUS at 17:01

## 2024-08-19 RX ADMIN — SODIUM CHLORIDE 1000 MG: 900 INJECTION INTRAVENOUS at 00:16

## 2024-08-19 RX ADMIN — TRAZODONE HYDROCHLORIDE 50 MG: 50 TABLET ORAL at 21:09

## 2024-08-19 NOTE — PLAN OF CARE
Problem: Safety - Adult  Goal: Free from fall injury  Outcome: Progressing     Problem: Discharge Planning  Goal: Discharge to home or other facility with appropriate resources  Outcome: Progressing     Problem: ABCDS Injury Assessment  Goal: Absence of physical injury  Outcome: Progressing     Problem: Physical Therapy - Adult  Goal: By Discharge: Performs mobility at highest level of function for planned discharge setting.  See evaluation for individualized goals.  Description: FUNCTIONAL STATUS PRIOR TO ADMISSION: pt reports mod I PTA, unsure of DME.     HOME SUPPORT PRIOR TO ADMISSION: Per pt, she lives with son in 1 story home. Pt was found down for ~2 days prior to admission while son was on work trip. Found down by neighbor.    Physical Therapy Goals  Initiated 8/18/2024  1.  Patient will move from supine to sit and sit to supine, scoot up and down, and roll side to side in bed with supervision/set-up within 7 day(s).    2.  Patient will perform sit to stand with supervision/set-up within 7 day(s).  3.  Patient will transfer from bed to chair and chair to bed with supervision/set-up using the least restrictive device within 7 day(s).  4.  Patient will ambulate with contact guard assist for 100 feet with the least restrictive device within 7 day(s).   5.  Patient will ascend/descend 3 stairs with B handrail(s) with contact guard assist within 7 day(s).   8/18/2024 1613 by Charisse Diamond, PT  Outcome: Progressing

## 2024-08-19 NOTE — PROGRESS NOTES
Hospitalist Progress Note    NAME:   Ronna Arzola   : 1935   MRN: [id number]     Date/Time: 2024 10:14 AM  Patient PCP: Carmen Aguero MD    Assessment / Plan:    Invasive bladder mass extending consistent with bladder malignancy, sclerotic appearance of the sacrum and left iliac bone concerning for either metastatic disease or Paget's disease.  UTI  Hematuria, resolved    -Dysuria and UA + for Nitrate/LE. U Cx w E coli   Continue ceftriaxone  -Urology on board, IR Bx vs TURBT     Elevated CK level   Gen Weakness  -Off IV fluids.     -TSH is within normal limits. CK back to normal  -PT OT recs home    Vitamin B12 deficiency  Generalized weakness   -Start vitamin B12 supplementation  -folic acid level wnl    Vitamin D deficiency  -Will add vitamin D supplementation    Hypertension  -Continue lisinopril    Pt request podiatry consult for long toenails     Medical Decision Making:   I personally reviewed labs: CBC, BMP  I personally reviewed imaging:  I personally reviewed EKG:  Toxic drug monitoring:   Discussed case with: pt, RN, family     Code Status: Full code  DVT Prophylaxis: scds  GI Prophylaxis:    Subjective:     Chief Complaint / Reason for Physician Visit  No abdominal pain, nausea or vomiting  Hematuria resolved  Still reports some generalized weakness  Overnight events noted no fever      Objective:     VITALS:   Last 24hrs VS reviewed since prior progress note. Most recent are:  Patient Vitals for the past 24 hrs:   BP Temp Temp src Pulse Resp SpO2   24 0800 (!) 159/71 98.6 °F (37 °C) Oral 81 14 100 %   24 0319 (!) 151/48 98.8 °F (37.1 °C) Oral 79 16 --   24 2259 (!) 137/57 98.1 °F (36.7 °C) -- 65 16 99 %   24 (!) 121/51 97.9 °F (36.6 °C) Oral 58 16 100 %   24 1514 (!) 124/54 97.5 °F (36.4 °C) Oral 62 16 97 %   24 1134 -- -- Oral -- -- --       No intake or output data in the 24 hours ending 24 1014     I had a face to face encounter

## 2024-08-19 NOTE — CARE COORDINATION
Care Management Initial Assessment       RUR: 11%  Readmission? No  1st IM letter given? Yes   1st  letter given: No     Chart reviewed. CM met with pt at the bedside to introduce self and role. Verified contact information and demographics. Pt lives in a one level home with 3 MARIA TERESA. Her son stays there with her at night. Pt is typically independent and ambulatory without a device. She plans to purchase a quad cane after d/c and is interested in getting a RW as well. Pt typically drives. Preferred pharmacy is [facility] on [address]. Hx of rehab at [facility]. PCP is Dr. Antoine. Pt reports family will transport home.     Pt is considering [facility] services, however may not be home bound as she wants to drive. Will revisit [facility] prior to d/c and continue to follow.         08/19/24 6429   Service Assessment   Patient Orientation Alert and Oriented   Cognition Alert   History Provided By Patient   Primary Caregiver Self   Support Systems Children;Friends/Neighbors   Patient's Healthcare Decision Maker is: Legal Next of Kin   PCP Verified by CM Yes  (Dr. Antoine)   Last Visit to PCP Within last 3 months   Prior Functional Level Independent in ADLs/IADLs   Current Functional Level Independent in ADLs/IADLs   Can patient return to prior living arrangement Yes   Ability to make needs known: Good   Family able to assist with home care needs: Yes  (son stays with pt at night)   Would you like for me to discuss the discharge plan with any other family members/significant others, and if so, who? Yes  (Parker Arzola (son))   Financial Resources Medicare   Social/Functional History   Lives With Son   Type of Home House   Home Layout One level   Home Access Stairs to enter with rails   Entrance Stairs - Number of Steps 3   Bathroom Accessibility Accessible   Receives Help From Family   ADL Assistance Independent   Homemaking Assistance Independent   Ambulation Assistance Independent   Transfer Assistance Independent   Active  Yes

## 2024-08-19 NOTE — PROGRESS NOTES
Progress Note    Patient: Ronna Arzola MRN: [id number]  SSN: [id number]    YOB: 1935  Age: 89 y.o.  Sex: female          ADMITTED:  8/16/2024 to Barbie Sr MD  for Dehydration [E86.0]  UTI (urinary tract infection) [N39.0]  Acute cystitis without hematuria [N30.00]  Fall, initial encounter [W19.XXXA]  B12 deficiency [E53.8]           Ronna Arzola was admitted for Dehydration [E86.0]  UTI (urinary tract infection) [N39.0]  Acute cystitis without hematuria [N30.00]  Fall, initial encounter [W19.XXXA]  B12 deficiency [E53.8].    Urology follow-up for hematuria with UTI.  Patient admitted with generalized weakness.  Reportedly having intermittent hematuria in setting of UTI.  No flank pain or suprapubic pain.  Her hemodynamics are stable and her renal function is within normal limits.  She came in with report of GLF and had a CT of her head that was unremarkable.  She had a CT abdomen pelvis ordered for hematuria workup. No previous smoking history.   Denies hx of hematuria prior to UTI s/s starting 2 weeks ago. No family  cancer hx.       Imaging reviewed with Dr. Mccormick. CT resulting large volume bladder mass invading muscle wall and bony lesions.  Fortunately not obstructing ureters.  Suspect T3 grade bladder cancer    Results of imaging and plan discussed with patient by Dr. Mccormick     CT ABDOMEN PELVIS W WO CONTRAST Additional Contrast? None    Result Date: 8/18/2024  1.  Invasive mass extending through the right lateral wall of the bladder consistent with bladder malignancy, with layering clot in the posterior and left lateral aspects of the bladder. 2.  Heterogeneous sclerotic appearance of the sacrum and left iliac bone concerning for either metastatic disease or Paget's disease. 3.  No retroperitoneal lymphadenopathy or evidence of solid organ metastatic disease. Electronically signed by CHYNA ANTONY CHEST PORTABLE    Result Date:

## 2024-08-19 NOTE — PROGRESS NOTES
End of Shift Note    Bedside shift change report given to Sissy SCHAFFER (oncoming nurse) by Lucy Melgar RN (offgoing nurse).  Report included the following information SBAR REPORTS LIST: SBAR, ED Summary, OR Summary, Intake/Output, MAR, Recent Results, Med Rec Status, and Cardiac Rhythm (NSR)    Shift worked:  5829-5854     Shift summary and any significant changes:     Pt had big clots out in her diaper(likely from her vagina), She peed a lot and she continuously brought out clots. Physician Jhonny Darden was notified and he stated close monitoring should continue. Pt was changed a lot of times last night. Due to the active bleeding I put in an order for CBC. Vitals were stable and she was asymptomatic     Concerns for physician to address:  Large Blood clots in the urine     Zone phone for oncoming shift:   [id number]         Cardiac:   Cardiac Monitoring: YES / NO: Yes    Genitourinary:   Urinary status: Urinary status: Patient is voiding without difficulty.    Patient Safety:  Fall Score: FALL RISK ASSESSMENT: At risk due to: weakness in BLE   Interventions: Fall Interventions Provided: Implemented/recommended use of non-skid footwear, Implemented/recommended assistive devices and encouraged their use, and Implemented/recommended resources for alarm system (personal alarm, bed alarm, call bell, etc.)       Length of Stay:  Expected LOS: 2  Actual LOS: 1      Lucy Melgar RN

## 2024-08-19 NOTE — PROGRESS NOTES
End of Shift Note    Bedside shift change report given to Deysi (oncoming nurse) by Yeong AE Jenny, RN (offgoing nurse).  Report included the following information SBAR    Shift worked:  7a - 3p     Shift summary and any significant changes:     Patient still having hematuria with clots. Scheduled meds given. Patient was up in recliner for couple of hours. Pt incontinent at times. Complained of urgency and burning sensation. NM bone scan and CT Chest ordered. Scheduled for bone biopsy tomorrow.     Concerns for physician to address:  Still having hematuria with clots     Zone phone for oncoming shift:          Activity:     Number times ambulated in hallways past shift: 1  Number of times OOB to chair past shift: 1    Cardiac:   Cardiac Monitoring: Yes           Access:  Current line(s): PIV     Genitourinary:   Urinary status: voiding and incontinent    Respiratory:      Chronic home O2 use?: NO  Incentive spirometer at bedside: N/A       GI:     Current diet:  ADULT DIET; Regular  Passing flatus: YES  Tolerating current diet: YES       Pain Management:   Patient states pain is manageable on current regimen: N/A    Skin:     Interventions: float heels and increase time out of bed    Patient Safety:  Fall Score:    Interventions: assistive device (walker, cane. etc), gripper socks, pt to call before getting OOB, and stay with me (per policy), bed alarm       Length of Stay:  Expected LOS: 5  Actual LOS: 1      Yeong AE Jenny, RN

## 2024-08-20 ENCOUNTER — APPOINTMENT (OUTPATIENT)
Facility: HOSPITAL | Age: 89
DRG: 669 | End: 2024-08-20
Payer: MEDICARE

## 2024-08-20 LAB
ANION GAP SERPL CALC-SCNC: 6 MMOL/L (ref 5–15)
BUN SERPL-MCNC: 3 MG/DL (ref 6–20)
BUN/CREAT SERPL: 6 (ref 12–20)
CALCIUM SERPL-MCNC: 8.3 MG/DL (ref 8.5–10.1)
CHLORIDE SERPL-SCNC: 110 MMOL/L (ref 97–108)
CO2 SERPL-SCNC: 22 MMOL/L (ref 21–32)
CREAT SERPL-MCNC: 0.53 MG/DL (ref 0.55–1.02)
GLUCOSE SERPL-MCNC: 100 MG/DL (ref 65–100)
POTASSIUM SERPL-SCNC: 3.8 MMOL/L (ref 3.5–5.1)
SODIUM SERPL-SCNC: 138 MMOL/L (ref 136–145)

## 2024-08-20 PROCEDURE — 80048 BASIC METABOLIC PNL TOTAL CA: CPT

## 2024-08-20 PROCEDURE — 97116 GAIT TRAINING THERAPY: CPT

## 2024-08-20 PROCEDURE — 3430000000 HC RX DIAGNOSTIC RADIOPHARMACEUTICAL: Performed by: INTERNAL MEDICINE

## 2024-08-20 PROCEDURE — 2580000003 HC RX 258: Performed by: INTERNAL MEDICINE

## 2024-08-20 PROCEDURE — 6360000002 HC RX W HCPCS: Performed by: GENERAL ACUTE CARE HOSPITAL

## 2024-08-20 PROCEDURE — 36415 COLL VENOUS BLD VENIPUNCTURE: CPT

## 2024-08-20 PROCEDURE — 6370000000 HC RX 637 (ALT 250 FOR IP): Performed by: GENERAL ACUTE CARE HOSPITAL

## 2024-08-20 PROCEDURE — 1100000000 HC RM PRIVATE

## 2024-08-20 PROCEDURE — 2580000003 HC RX 258: Performed by: GENERAL ACUTE CARE HOSPITAL

## 2024-08-20 PROCEDURE — 6370000000 HC RX 637 (ALT 250 FOR IP): Performed by: INTERNAL MEDICINE

## 2024-08-20 PROCEDURE — 78306 BONE IMAGING WHOLE BODY: CPT | Performed by: NURSE PRACTITIONER

## 2024-08-20 PROCEDURE — A9503 TC99M MEDRONATE: HCPCS | Performed by: INTERNAL MEDICINE

## 2024-08-20 PROCEDURE — 97530 THERAPEUTIC ACTIVITIES: CPT

## 2024-08-20 RX ORDER — TC 99M MEDRONATE 20 MG/10ML
20.7 INJECTION, POWDER, LYOPHILIZED, FOR SOLUTION INTRAVENOUS
Status: COMPLETED | OUTPATIENT
Start: 2024-08-20 | End: 2024-08-20

## 2024-08-20 RX ORDER — PHENAZOPYRIDINE HYDROCHLORIDE 100 MG/1
200 TABLET, FILM COATED ORAL 3 TIMES DAILY PRN
Status: DISCONTINUED | OUTPATIENT
Start: 2024-08-20 | End: 2024-08-26 | Stop reason: HOSPADM

## 2024-08-20 RX ADMIN — LISINOPRIL 10 MG: 5 TABLET ORAL at 10:32

## 2024-08-20 RX ADMIN — SODIUM CHLORIDE, PRESERVATIVE FREE 10 ML: 5 INJECTION INTRAVENOUS at 10:34

## 2024-08-20 RX ADMIN — Medication 5000 UNITS: at 10:32

## 2024-08-20 RX ADMIN — SODIUM CHLORIDE, PRESERVATIVE FREE 10 ML: 5 INJECTION INTRAVENOUS at 21:17

## 2024-08-20 RX ADMIN — PHENAZOPYRIDINE 200 MG: 100 TABLET ORAL at 15:47

## 2024-08-20 RX ADMIN — CYANOCOBALAMIN 1000 MCG: 1000 INJECTION INTRAMUSCULAR; SUBCUTANEOUS at 10:32

## 2024-08-20 RX ADMIN — SODIUM CHLORIDE 1000 MG: 900 INJECTION INTRAVENOUS at 19:11

## 2024-08-20 RX ADMIN — TC 99M MEDRONATE 20.7 MILLICURIE: 20 INJECTION, POWDER, LYOPHILIZED, FOR SOLUTION INTRAVENOUS at 08:15

## 2024-08-20 NOTE — PROGRESS NOTES
Hospitalist Progress Note    NAME:   Ronna Arzola   : 1935   MRN: [id number]     Date/Time: 2024 4:50 PM  Patient PCP: Carmen Aguero MD    Assessment / Plan:    Invasive bladder mass extending consistent with bladder malignancy, sclerotic appearance of the sacrum and left iliac bone concerning for either metastatic disease or Paget's disease.  UTI  Hematuria, resolved    Generalized weakness   -Dysuria and UA + for Nitrate/LE. U Cx w E coli   -Continue ceftriaxone as per Uro  -Staging CT chest neg for Mets.   -Urology on board, probably needs TURBT tentatively for   -IR canceled bone Bx as they think the bone lesion is likely Paget dis     Elevated CK level   Gen Weakness  -Off IV fluids.     -TSH is within normal limits. CK back to normal  -PT OT recs SNF     Vitamin B12 deficiency  -Start vitamin B12 supplementation  -folic acid level wnl    Vitamin D deficiency  ? Paget disease   -Will add vitamin D supplementation  -needs to replete Vit D prior to treating Paget Disease, f/up w PCP as outpt for that     Hypertension  -Continue lisinopril    Pt request podiatry consult for long toenails     Medical Decision Making:   I personally reviewed labs: CBC, BMP  I personally reviewed imaging:  I personally reviewed EKG:  Toxic drug monitoring:   Discussed case with: pt, RN    Code Status: Full code  DVT Prophylaxis: scds  GI Prophylaxis:    Subjective:     Chief Complaint / Reason for Physician Visit  No abdominal pain, nausea or vomiting  Hematuria resolved  Bladder spasms   Still reports some generalized weakness  Overnight events noted no fever      Objective:     VITALS:   Last 24hrs VS reviewed since prior progress note. Most recent are:  Patient Vitals for the past 24 hrs:   BP Temp Temp src Pulse Resp SpO2   24 0830 (!) 151/64 97.6 °F (36.4 °C) Oral 65 16 96 %   24 0218 (!) 148/70 97.9 °F (36.6 °C) -- 78 -- 100 %   24 (!) 125/46 -- Oral 75 16 96 %   24

## 2024-08-20 NOTE — CONSULTS
Received consult for toe nail trimming   Will try to see if there is nail debrider available if not this is a non reimbursable and non urgent care, this  can be done as outpatient   Please refer the patient to my out patient clinic when discharged

## 2024-08-20 NOTE — PROGRESS NOTES
Progress Note    Patient: Ronna Arzola MRN: [id number]  SSN: [id number]    YOB: 1935  Age: 89 y.o.  Sex: female          ADMITTED:  8/16/2024 to Barbie Sr MD  for Dehydration [E86.0]  UTI (urinary tract infection) [N39.0]  Acute cystitis without hematuria [N30.00]  Fall, initial encounter [W19.XXXA]  B12 deficiency [E53.8]           Ronna Arzola was admitted for Dehydration [E86.0]  UTI (urinary tract infection) [N39.0]  Acute cystitis without hematuria [N30.00]  Fall, initial encounter [W19.XXXA]  B12 deficiency [E53.8].    Urology follow-up for hematuria with UTI.  Patient admitted with generalized weakness.  Reportedly having intermittent hematuria in setting of UTI.  No flank pain or suprapubic pain.  Her hemodynamics are stable and her renal function is within normal limits.  She came in with report of GLF and had a CT of her head that was unremarkable.  She had a CT abdomen pelvis ordered for hematuria workup. No previous smoking history.   Denies hx of hematuria prior to UTI s/s starting 2 weeks ago. No family  cancer hx.     8/20  Continues with dysuria and suprapubic pain with voids. Would like to consider pain medication  CT imaging   Hematuria resolving  Bone scan pending today     Imaging reviewed with Dr. Mccormick. CT resulting large volume bladder mass invading muscle wall and bony lesions.  Fortunately not obstructing ureters.  Suspect T3 grade bladder cancer    Results of imaging and plan discussed with patient by Dr. Mccormick     CT ABDOMEN PELVIS W WO CONTRAST Additional Contrast? None    Result Date: 8/18/2024  1.  Invasive mass extending through the right lateral wall of the bladder consistent with bladder malignancy, with layering clot in the posterior and left lateral aspects of the bladder. 2.  Heterogeneous sclerotic appearance of the sacrum and left iliac bone concerning for either metastatic disease or Paget's disease. 3.

## 2024-08-20 NOTE — CONSULTS
INTERVENTIONAL RADIOLOGY  Consult Note      Patient:  Ronna Arzola  :  1935  Age:  89 y.o.  MRN:  [id number]    Today's Date:  2024  Admission Date:  2024  Hospital Day:  2  Consult requested by:  Amber Bills NP    Reason for consult:  89 yo female admitted with hematuria and fall. CT abd/pelv shows bladder tumor and clot as well as sclerotic appearance of sacrum and left iliac nones. IR bone biopsy requested.    CURRENT MEDICATIONS  Current Facility-Administered Medications   Medication Dose Route Frequency Provider Last Rate Last Admin    traZODone (DESYREL) tablet 50 mg  50 mg Oral Nightly PRN Donavon Disla DO   50 mg at 24    cyanocobalamin injection 1,000 mcg  1,000 mcg IntraMUSCular Daily Barbie Sr MD   1,000 mcg at 24 0919    [START ON 2024] vitamin B-12 (CYANOCOBALAMIN) tablet 1,000 mcg  1,000 mcg Oral Daily Barbie Sr MD        Vitamin D (CHOLECALCIFEROL) tablet 5,000 Units  5,000 Units Oral Daily Wilmer Lagunas MD   5,000 Units at 24 0920    [Held by provider] 0.9 % sodium chloride infusion   IntraVENous Continuous Barbie Sr MD   Stopped at 24 1238    lisinopril (PRINIVIL;ZESTRIL) tablet 10 mg  10 mg Oral Daily Wilmer Lagunas MD   10 mg at 24 0919    sodium chloride flush 0.9 % injection 5-40 mL  5-40 mL IntraVENous 2 times per day Wilmer Lagunas MD   10 mL at 24    sodium chloride flush 0.9 % injection 5-40 mL  5-40 mL IntraVENous PRN Wilmer Lagunas MD        0.9 % sodium chloride infusion   IntraVENous PRN Wilmer Lagunas MD        [Held by provider] enoxaparin (LOVENOX) injection 40 mg  40 mg SubCUTAneous Daily Wilmer Lagunas MD   40 mg at 24 0938    ondansetron (ZOFRAN-ODT) disintegrating tablet 4 mg  4 mg Oral Q8H PRN Wilmer Lagunas MD        Or    ondansetron (ZOFRAN) injection 4 mg  4 mg IntraVENous Q6H PRN Wilmer Lagunas MD        polyethylene glycol (GLYCOLAX) packet 17 g   nonenhancing layering high density material in the  posterior and left lateral walls of the bladder, consistent with blood products.  BONES: Heterogeneous, lytic and sclerotic appearance of the sacrum and L3  vertebral body concerning for osseous metastatic disease versus Paget's disease,  as the appearance of the left hemipelvis is also enlarged with thickened  trabeculae.  ABDOMINAL WALL: No mass or hernia.  ADDITIONAL COMMENTS: N/A     IMPRESSION:  1.  Invasive mass extending through the right lateral wall of the bladder  consistent with bladder malignancy, with layering clot in the posterior and left  lateral aspects of the bladder.  2.  Heterogeneous sclerotic appearance of the sacrum and left iliac bone  concerning for either metastatic disease or Paget's disease.  3.  No retroperitoneal lymphadenopathy or evidence of solid organ metastatic  disease.    LABS  Lab Results   Component Value Date/Time    WBC 6.7 08/19/2024 03:15 AM    HGB 11.8 08/19/2024 03:15 AM    HCT 37.0 08/19/2024 03:15 AM     08/19/2024 03:15 AM    MCV 90.5 08/19/2024 03:15 AM     Lab Results   Component Value Date/Time     08/20/2024 06:50 AM    K 3.8 08/20/2024 06:50 AM     08/20/2024 06:50 AM    CO2 22 08/20/2024 06:50 AM    BUN 3 08/20/2024 06:50 AM     No results found for: \"INR\", \"PT1\"    ASSESSMENT / PLAN  89 yo female with bladder tumor and hematuria. CT imaging reviewed by BRUNO Turner. Sclerotic areas of sacrum and pelvis appear most consistent with Paget's disease- biopsy would be low yield. Recommends biopsy of bladder mass.    Code status:  Full Code       Case discussed with Dr. Aura Turner.    We appreciate the opportunity to participate in Mrs. Arzola's care.      Tova Carnes PA-C  [facility]      CC:  Amber Bills NP

## 2024-08-20 NOTE — PROGRESS NOTES
Physician Progress Note      PATIENT:               BRADY INFANTE  [facility] #:                  [id number]  :                       1935  ADMIT DATE:       2024 2:04 PM  DISCH DATE:  RESPONDING  PROVIDER #:        Barbie Sr MD          QUERY TEXT:    Pt admitted with UTI, hematuria, fall.  Pt noted to have documented   rhabdomyolysis.  If possible, please document in progress notes and discharge   summary if you are evaluating and/or treating any of the following:    The medical record reflects the following:  Risk Factors:  -per ED MD note documented 24, \" 89 y.o. female who presents for fall,   failure to thrive.  Per EMS the patient lives with her son.  Reported the   patient is been on the ground for the past 2 days and is unable to get up.    Her son did account in business.  Her neighbor was checking in on her and   found her on the ground and was unable to stand.  EMS was called to transport   the patient to the hospital\".    Clinical Indicators:  -per H&P documented 24 and Attending PN documented 24, \"Acute   Rhabdomyolysis\"    -per MD note documented 24 (Parrish), \"Messaged by nursing and   informed patient was experiencing hematuria. Has UA consistent with UTI on   ceftriaxone. Unclear if there is been recent straight catheterization-awaiting   nursing response. Nevertheless. Patient is hemodynamically stable and despite   bright red appearance of urine and this is likely reflective of low volumes   of bleeding. Will add on bladder checks to ensure no retention from clot.   Defer need for urology consultation to daytime pending continued versus   resolution of hematuria\".    -labs as documented in Epic:  24 1449:   24 1616: urine c/s + >100k E. Coli  24 0530:     Treatment:  -labs, IV fluids, IV Ceftriaxone, PT/OT consults Urology consult    Per [email or web address]  Traumatic rhabdomyolysis cause

## 2024-08-20 NOTE — PLAN OF CARE
Problem: Physical Therapy - Adult  Goal: By Discharge: Performs mobility at highest level of function for planned discharge setting.  See evaluation for individualized goals.  Description: FUNCTIONAL STATUS PRIOR TO ADMISSION: pt reports mod I PTA, unsure of DME.     HOME SUPPORT PRIOR TO ADMISSION: Per pt, she lives with son in 1 story home. Pt was found down for ~2 days prior to admission while son was on work trip. Found down by neighbor.    Physical Therapy Goals  Initiated 8/18/2024  1.  Patient will move from supine to sit and sit to supine, scoot up and down, and roll side to side in bed with supervision/set-up within 7 day(s).    2.  Patient will perform sit to stand with supervision/set-up within 7 day(s).  3.  Patient will transfer from bed to chair and chair to bed with supervision/set-up using the least restrictive device within 7 day(s).  4.  Patient will ambulate with contact guard assist for 100 feet with the least restrictive device within 7 day(s).   5.  Patient will ascend/descend 3 stairs with B handrail(s) with contact guard assist within 7 day(s).   Outcome: Progressing   PHYSICAL THERAPY TREATMENT    Patient: Ronna Arzola (89 y.o. female)  Date: 8/20/2024  Diagnosis: Dehydration [E86.0]  UTI (urinary tract infection) [N39.0]  Acute cystitis without hematuria [N30.00]  Fall, initial encounter [W19.XXXA]  B12 deficiency [E53.8] UTI (urinary tract infection)  Procedure(s) (LRB):  TRANSURETHRAL RESECTION OF BLADDER TUMOR (N/A)    Precautions: Fall Risk                      ASSESSMENT:  Patient continues to benefit from skilled PT services and is slowly progressing towards goals. The patient was sitting on the edge of the bed with her son and daughter in law present when PT arrived. Came to standing with min a due to weakness and decreased standing balance. Min a gait training progressed with quad cane to 65 feet. Noted multiple losses of balance due to increased path deviations and increased  reach, Bed/ chair alarm activated, and Caregiver / family present      COMMUNICATION/EDUCATION:   The patient's plan of care was discussed with: registered nurse and     Patient Education  Education Given To: Patient  Education Provided: Role of Therapy;Plan of Care;Transfer Training;Fall Prevention Strategies;Precautions;Energy Conservation;Family Education;Equipment  Education Provided Comments: need for RW and increased assistance at home as patient is at high risk for falls.  Education Method: Demonstration;Verbal;Teach Back  Barriers to Learning: Cognition  Education Outcome: Verbalized understanding;Continued education needed;Unable to demonstrate understanding      Sp Corona PT  Minutes: 26

## 2024-08-20 NOTE — PROGRESS NOTES
[facility] [place] [facility]  [address] [address]  W: [phone] F: [phone]    Asked to arrange OP follow up for biopsy results by Urology. OP follow up has been scheduled for 8/29 with Dr. Tay. Rounded on patient to introduce team, patient's son was also present. Discussed OP follow up after bone biopsy today, both verbalized understanding/agreement.     Please call with questions/concerns.

## 2024-08-20 NOTE — PROGRESS NOTES
Chart reviewed. Spoke with RN about mobilizing the patient and he reported the patient is going off the floor soon for a bone biopsy. Will defer and check back this afternoon.    Sp Corona, PT

## 2024-08-20 NOTE — PROGRESS NOTES
Bedside shift change report given to Adelso SCHAFFER (oncoming nurse) by Lucy SCHAFFER (offgoing nurse). Report included the following information Nurse Handoff Report, Index, Adult Overview, Surgery Report, Intake/Output, MAR, Recent Results, Med Rec Status, and Cardiac Rhythm (NSR) .     Pt had a calm night, no complaints made,  Pt has being NPO from midnight for a possible bone scan  with IR. Pt was asleep during shift change.

## 2024-08-21 PROCEDURE — 2580000003 HC RX 258: Performed by: GENERAL ACUTE CARE HOSPITAL

## 2024-08-21 PROCEDURE — 6370000000 HC RX 637 (ALT 250 FOR IP): Performed by: INTERNAL MEDICINE

## 2024-08-21 PROCEDURE — 97530 THERAPEUTIC ACTIVITIES: CPT

## 2024-08-21 PROCEDURE — 6370000000 HC RX 637 (ALT 250 FOR IP): Performed by: STUDENT IN AN ORGANIZED HEALTH CARE EDUCATION/TRAINING PROGRAM

## 2024-08-21 PROCEDURE — 2580000003 HC RX 258: Performed by: INTERNAL MEDICINE

## 2024-08-21 PROCEDURE — 1100000000 HC RM PRIVATE

## 2024-08-21 PROCEDURE — 6370000000 HC RX 637 (ALT 250 FOR IP): Performed by: GENERAL ACUTE CARE HOSPITAL

## 2024-08-21 PROCEDURE — 6360000002 HC RX W HCPCS: Performed by: GENERAL ACUTE CARE HOSPITAL

## 2024-08-21 RX ADMIN — TRAZODONE HYDROCHLORIDE 50 MG: 50 TABLET ORAL at 22:32

## 2024-08-21 RX ADMIN — LISINOPRIL 10 MG: 5 TABLET ORAL at 09:29

## 2024-08-21 RX ADMIN — SODIUM CHLORIDE, PRESERVATIVE FREE 10 ML: 5 INJECTION INTRAVENOUS at 21:16

## 2024-08-21 RX ADMIN — SODIUM CHLORIDE 1000 MG: 900 INJECTION INTRAVENOUS at 17:15

## 2024-08-21 RX ADMIN — CYANOCOBALAMIN 1000 MCG: 1000 INJECTION INTRAMUSCULAR; SUBCUTANEOUS at 09:29

## 2024-08-21 RX ADMIN — SODIUM CHLORIDE, PRESERVATIVE FREE 10 ML: 5 INJECTION INTRAVENOUS at 09:30

## 2024-08-21 RX ADMIN — Medication 5000 UNITS: at 09:29

## 2024-08-21 RX ADMIN — PHENAZOPYRIDINE 200 MG: 100 TABLET ORAL at 13:01

## 2024-08-21 NOTE — PLAN OF CARE
Problem: Physical Therapy - Adult  Goal: By Discharge: Performs mobility at highest level of function for planned discharge setting.  See evaluation for individualized goals.  Description: FUNCTIONAL STATUS PRIOR TO ADMISSION: pt reports mod I PTA, unsure of DME.     HOME SUPPORT PRIOR TO ADMISSION: Per pt, she lives with son in 1 story home. Pt was found down for ~2 days prior to admission while son was on work trip. Found down by neighbor.    Physical Therapy Goals  Initiated 8/18/2024  1.  Patient will move from supine to sit and sit to supine, scoot up and down, and roll side to side in bed with supervision/set-up within 7 day(s).    2.  Patient will perform sit to stand with supervision/set-up within 7 day(s).  3.  Patient will transfer from bed to chair and chair to bed with supervision/set-up using the least restrictive device within 7 day(s).  4.  Patient will ambulate with contact guard assist for 100 feet with the least restrictive device within 7 day(s).   5.  Patient will ascend/descend 3 stairs with B handrail(s) with contact guard assist within 7 day(s).   Outcome: Not Progressing   PHYSICAL THERAPY TREATMENT    Patient: Ronna Arzola (89 y.o. female)  Date: 8/21/2024  Diagnosis: Dehydration [E86.0]  UTI (urinary tract infection) [N39.0]  Acute cystitis without hematuria [N30.00]  Fall, initial encounter [W19.XXXA]  B12 deficiency [E53.8] UTI (urinary tract infection)  Procedure(s) (LRB):  TRANSURETHRAL RESECTION OF BLADDER TUMOR (N/A)    Precautions: Fall Risk                      ASSESSMENT:  Patient continues to benefit from skilled PT services and is not progressing towards goals. The patient was lying in bed when PT arrived. Needed min a and additional time to come to sitting. Provided sitting balance exercises with patient reaching outside and across LEON to each side x 5 reps each direction. Needed min a to come to standing due to LE weakness with RW support. Provided standing exercises with

## 2024-08-21 NOTE — PROGRESS NOTES
Hospitalist Progress Note    NAME:   Ronna Arzola   : 1935   MRN: [id number]     Date/Time: 2024 4:12 PM  Patient PCP: Carmen Aguero MD    Assessment / Plan:    Invasive bladder mass extending consistent with bladder malignancy, sclerotic appearance of the sacrum and left iliac bone concerning for either metastatic disease or Paget's disease.  UTI  Hematuria, resolved    Generalized weakness   -Dysuria and UA + for Nitrate/LE. U Cx w E coli   -Continue ceftriaxone as per Uro  -Staging CT chest neg for Mets.   -Urology on board, - plan for TURBT  noted-- NPO p MN  -IR canceled bone Bx as they think the bone lesion is likely Paget dis     Elevated CK level   Gen Weakness  -Off IV fluids.     -TSH is within normal limits. CK back to normal  -PT OT recs SNF     Vitamin B12 deficiency  -Start vitamin B12 supplementation  -folic acid level wnl    Vitamin D deficiency  ? Paget disease   -Will add vitamin D supplementation  -needs to replete Vit D prior to treating Paget Disease, f/up w PCP as outpt for that     Hypertension  -Continue lisinopril    Pt request podiatry consult for long toenails     Medical Decision Making:   I personally reviewed labs: CBC, BMP  I personally reviewed imaging:  I personally reviewed EKG:  Toxic drug monitoring:   Discussed case with: pt, RN    Code Status: Full code  DVT Prophylaxis: scds  GI Prophylaxis:    Subjective:     Chief Complaint / Reason for Physician Visit  No abdominal pain, nausea or vomiting  Hematuria recurred today  Bladder spasms   Still reports some generalized weakness  Overnight events noted no fever  NPO p MN for TURBT  noted      Objective:     VITALS:   Last 24hrs VS reviewed since prior progress note. Most recent are:  Patient Vitals for the past 24 hrs:   BP Temp Temp src Pulse Resp SpO2   24 1245 (!) 170/66 -- Oral 72 16 97 %   24 0830 138/74 97.7 °F (36.5 °C) Oral 66 16 97 %   24 2018 (!) 124/59 98.4 °F (36.9 °C)

## 2024-08-21 NOTE — PLAN OF CARE
Problem: Safety - Adult  Goal: Free from fall injury  8/20/2024 2252 by Gabby Quarles RN  Outcome: Progressing  8/20/2024 2007 by Jesu Camp RN  Outcome: Progressing     Problem: Discharge Planning  Goal: Discharge to home or other facility with appropriate resources  8/20/2024 2252 by Gabby Quarles RN  Outcome: Progressing  8/20/2024 2007 by Jesu Camp RN  Outcome: Progressing     Problem: ABCDS Injury Assessment  Goal: Absence of physical injury  8/20/2024 2252 by Gabby Quarles RN  Outcome: Progressing  8/20/2024 2007 by Jesu Camp RN  Outcome: Progressing     Problem: Physical Therapy - Adult  Goal: By Discharge: Performs mobility at highest level of function for planned discharge setting.  See evaluation for individualized goals.  Description: FUNCTIONAL STATUS PRIOR TO ADMISSION: pt reports mod I PTA, unsure of DME.     HOME SUPPORT PRIOR TO ADMISSION: Per pt, she lives with son in [address] home. Pt was found down for ~2 days prior to admission while son was on work trip. Found down by neighbor.    Physical Therapy Goals  Initiated 8/18/2024  1.  Patient will move from supine to sit and sit to supine, scoot up and down, and roll side to side in bed with supervision/set-up within 7 day(s).    2.  Patient will perform sit to stand with supervision/set-up within 7 day(s).  3.  Patient will transfer from bed to chair and chair to bed with supervision/set-up using the least restrictive device within 7 day(s).  4.  Patient will ambulate with contact guard assist for 100 feet with the least restrictive device within 7 day(s).   5.  Patient will ascend/descend 3 stairs with B handrail(s) with contact guard assist within 7 day(s).   8/20/2024 1534 by Sp Corona, PRASANNA  Outcome: Progressing     Problem: Skin/Tissue Integrity  Goal: Absence of new skin breakdown  Description: 1.  Monitor for areas of redness and/or skin breakdown  2.  Assess vascular access sites hourly  3.  Every 4-6 hours minimum:

## 2024-08-21 NOTE — PROGRESS NOTES
Patient: Ronna Arzola MRN: [id number]  SSN: [id number]    YOB: 1935  Age: 89 y.o.  Sex: female        ADMITTED: 2024 to Sulema Doan MD by Barbie Sr MD for Dehydration [E86.0]  UTI (urinary tract infection) [N39.0]  Acute cystitis without hematuria [N30.00]  Fall, initial encounter [W19.XXXA]  B12 deficiency [E53.8]  POD# * No surgery date entered * Procedure(s):  TRANSURETHRAL RESECTION OF BLADDER TUMOR    Ronna Arzola is doing well today.  She has no complaints.  Daughter and son are at the bedside today.  Very good patient historians and she has a very good support system here.  We have followed for hematuria with UTI.  She was admitted with generalized weakness.  Reports of intermittent hematuria in setting of UTI.  She has no abdominal pain or flank pain at this time.  She has no fevers or chills.  She was previously having some dysuria and suprapubic pain but no complaints of this today.  She will sometimes have some pressure with urination like spasms.  Afebrile.    No family history of  cancer.    Labs 2024 creatinine 0.53, GFR 89.  Previous CBC 2024 WBC 6.7, hemoglobin 11.8, platelets 213.    Positive urine culture 2024 E. coli.  She has remained on IV Rocephin.  She is nontoxic-appearing.  She is in no apparent distress.    CT AP with and without contrast 2024  noting invasive mass extending through the right lateral wall of the bladder consistent with bladder malignancy, with layering clot in the posterior and left  lateral aspects of the bladder. Heterogeneous sclerotic appearance of the sacrum and left iliac bone  concerning for either metastatic disease or Paget's disease. No retroperitoneal lymphadenopathy or evidence of solid organ metastatic disease.    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  No pattern of skeletal metastases.    Vitals: Temp (24hrs), Av.1 °F (36.7 °C), Min:97.7 °F (36.5 °C),  Max:98.4 °F (36.9 °C)    Blood pressure 138/74, pulse 66, temperature 97.7 °F (36.5 °C), temperature source Oral, resp. rate 16, height 1.524 m (5'), weight 80.6 kg (177 lb 11.1 oz), SpO2 97%.    Intake and Output:  08/19 1901 - 08/21 0700  In: 600 [P.O.:600]  Out: -   No intake/output data recorded.  SHOLA Output lats 24 hrs: No data found.   SHOLA Output last 8 hrs: No data found.  BM over last 24 hrs: No data found.    Physical Exam  General: NAD, pleasant  Respiratory: no distress, breathing easily, room air  Abdomen: soft, no distention; non-tender to palpation  : no CVA tenderness, voiding independently, clear yellow UA  Neuro: Appropriate, no focal neurological deficits  Skin: warm, dry  Extremities: moves all, full ROM    Labs:  CBC:   Lab Results   Component Value Date/Time    WBC 6.7 08/19/2024 03:15 AM    HCT 37.0 08/19/2024 03:15 AM     08/19/2024 03:15 AM     BMP:   Lab Results   Component Value Date/Time     08/20/2024 06:50 AM    K 3.8 08/20/2024 06:50 AM     08/20/2024 06:50 AM    CO2 22 08/20/2024 06:50 AM    BUN 3 08/20/2024 06:50 AM     Assessment/Plan:   Bladder Mass - ?T3 grade bladder cancer with muscle and fat invasion.  UTI  - Scheduled for Transurethral Resection of Bladder Tumor (TURBT) for biopsy and pathology with Dr. Trace Hernandez 8/22/24.  - I discussed the process of this procedure with the patient and her family including the risk for bleeding infection.  They verbalized understanding of the information provided and wish to proceed as discussed.  - NPO after midnight to begin 0015 hrs 8/22/2024.  - Appreciate oncology's input and expertise.  - Following    D/w Dr. Shai Mccormick    Supervising MD, Dr. Shai Mccormick  Signed By: ELISA MAYES, JUNG - NP - August 21, 2024

## 2024-08-22 ENCOUNTER — ANESTHESIA (OUTPATIENT)
Facility: HOSPITAL | Age: 89
End: 2024-08-22
Payer: MEDICARE

## 2024-08-22 ENCOUNTER — ANESTHESIA EVENT (OUTPATIENT)
Facility: HOSPITAL | Age: 89
End: 2024-08-22
Payer: MEDICARE

## 2024-08-22 LAB
ANION GAP SERPL CALC-SCNC: 6 MMOL/L (ref 5–15)
BASOPHILS # BLD: 0.1 K/UL (ref 0–0.1)
BASOPHILS NFR BLD: 1 % (ref 0–1)
BUN SERPL-MCNC: 5 MG/DL (ref 6–20)
BUN/CREAT SERPL: 9 (ref 12–20)
CALCIUM SERPL-MCNC: 8.5 MG/DL (ref 8.5–10.1)
CHLORIDE SERPL-SCNC: 108 MMOL/L (ref 97–108)
CO2 SERPL-SCNC: 25 MMOL/L (ref 21–32)
CREAT SERPL-MCNC: 0.58 MG/DL (ref 0.55–1.02)
DIFFERENTIAL METHOD BLD: ABNORMAL
EOSINOPHIL # BLD: 0.3 K/UL (ref 0–0.4)
EOSINOPHIL NFR BLD: 5 % (ref 0–7)
ERYTHROCYTE [DISTWIDTH] IN BLOOD BY AUTOMATED COUNT: 13.8 % (ref 11.5–14.5)
GLUCOSE SERPL-MCNC: 107 MG/DL (ref 65–100)
HCT VFR BLD AUTO: 34.1 % (ref 35–47)
HGB BLD-MCNC: 11 G/DL (ref 11.5–16)
IMM GRANULOCYTES # BLD AUTO: 0 K/UL (ref 0–0.04)
IMM GRANULOCYTES NFR BLD AUTO: 0 % (ref 0–0.5)
LYMPHOCYTES # BLD: 1.3 K/UL (ref 0.8–3.5)
LYMPHOCYTES NFR BLD: 20 % (ref 12–49)
MCH RBC QN AUTO: 28.9 PG (ref 26–34)
MCHC RBC AUTO-ENTMCNC: 32.3 G/DL (ref 30–36.5)
MCV RBC AUTO: 89.5 FL (ref 80–99)
MONOCYTES # BLD: 0.9 K/UL (ref 0–1)
MONOCYTES NFR BLD: 14 % (ref 5–13)
NEUTS SEG # BLD: 4.1 K/UL (ref 1.8–8)
NEUTS SEG NFR BLD: 60 % (ref 32–75)
NRBC # BLD: 0 K/UL (ref 0–0.01)
NRBC BLD-RTO: 0 PER 100 WBC
PLATELET # BLD AUTO: 216 K/UL (ref 150–400)
PMV BLD AUTO: 9.4 FL (ref 8.9–12.9)
POTASSIUM SERPL-SCNC: 3.3 MMOL/L (ref 3.5–5.1)
RBC # BLD AUTO: 3.81 M/UL (ref 3.8–5.2)
SODIUM SERPL-SCNC: 139 MMOL/L (ref 136–145)
WBC # BLD AUTO: 6.6 K/UL (ref 3.6–11)

## 2024-08-22 PROCEDURE — 6370000000 HC RX 637 (ALT 250 FOR IP): Performed by: UROLOGY

## 2024-08-22 PROCEDURE — 85025 COMPLETE CBC W/AUTO DIFF WBC: CPT

## 2024-08-22 PROCEDURE — 7100000001 HC PACU RECOVERY - ADDTL 15 MIN: Performed by: UROLOGY

## 2024-08-22 PROCEDURE — 1100000000 HC RM PRIVATE

## 2024-08-22 PROCEDURE — 2580000003 HC RX 258: Performed by: NURSE ANESTHETIST, CERTIFIED REGISTERED

## 2024-08-22 PROCEDURE — 3600000014 HC SURGERY LEVEL 4 ADDTL 15MIN: Performed by: UROLOGY

## 2024-08-22 PROCEDURE — 36415 COLL VENOUS BLD VENIPUNCTURE: CPT

## 2024-08-22 PROCEDURE — 6370000000 HC RX 637 (ALT 250 FOR IP): Performed by: INTERNAL MEDICINE

## 2024-08-22 PROCEDURE — 3700000001 HC ADD 15 MINUTES (ANESTHESIA): Performed by: UROLOGY

## 2024-08-22 PROCEDURE — 6360000002 HC RX W HCPCS: Performed by: NURSE ANESTHETIST, CERTIFIED REGISTERED

## 2024-08-22 PROCEDURE — 2500000003 HC RX 250 WO HCPCS: Performed by: NURSE ANESTHETIST, CERTIFIED REGISTERED

## 2024-08-22 PROCEDURE — 2580000003 HC RX 258: Performed by: UROLOGY

## 2024-08-22 PROCEDURE — 88307 TISSUE EXAM BY PATHOLOGIST: CPT

## 2024-08-22 PROCEDURE — 3600000004 HC SURGERY LEVEL 4 BASE: Performed by: UROLOGY

## 2024-08-22 PROCEDURE — 2709999900 HC NON-CHARGEABLE SUPPLY: Performed by: UROLOGY

## 2024-08-22 PROCEDURE — 7100000000 HC PACU RECOVERY - FIRST 15 MIN: Performed by: UROLOGY

## 2024-08-22 PROCEDURE — 3700000000 HC ANESTHESIA ATTENDED CARE: Performed by: UROLOGY

## 2024-08-22 PROCEDURE — 0TBB8ZX EXCISION OF BLADDER, VIA NATURAL OR ARTIFICIAL OPENING ENDOSCOPIC, DIAGNOSTIC: ICD-10-PCS | Performed by: UROLOGY

## 2024-08-22 PROCEDURE — 80048 BASIC METABOLIC PNL TOTAL CA: CPT

## 2024-08-22 RX ORDER — NALOXONE HYDROCHLORIDE 0.4 MG/ML
INJECTION, SOLUTION INTRAMUSCULAR; INTRAVENOUS; SUBCUTANEOUS PRN
Status: DISCONTINUED | OUTPATIENT
Start: 2024-08-22 | End: 2024-08-22 | Stop reason: HOSPADM

## 2024-08-22 RX ORDER — OXYCODONE HYDROCHLORIDE 5 MG/1
2.5 TABLET ORAL EVERY 6 HOURS PRN
Status: DISCONTINUED | OUTPATIENT
Start: 2024-08-22 | End: 2024-08-26 | Stop reason: HOSPADM

## 2024-08-22 RX ORDER — HYDRALAZINE HYDROCHLORIDE 20 MG/ML
5 INJECTION INTRAMUSCULAR; INTRAVENOUS EVERY 4 HOURS PRN
Status: DISCONTINUED | OUTPATIENT
Start: 2024-08-22 | End: 2024-08-26 | Stop reason: HOSPADM

## 2024-08-22 RX ORDER — ONDANSETRON 2 MG/ML
INJECTION INTRAMUSCULAR; INTRAVENOUS PRN
Status: DISCONTINUED | OUTPATIENT
Start: 2024-08-22 | End: 2024-08-22 | Stop reason: SDUPTHER

## 2024-08-22 RX ORDER — FENTANYL CITRATE 50 UG/ML
INJECTION, SOLUTION INTRAMUSCULAR; INTRAVENOUS PRN
Status: DISCONTINUED | OUTPATIENT
Start: 2024-08-22 | End: 2024-08-22 | Stop reason: SDUPTHER

## 2024-08-22 RX ORDER — POTASSIUM CHLORIDE 1500 MG/1
20 TABLET, EXTENDED RELEASE ORAL ONCE
Status: COMPLETED | OUTPATIENT
Start: 2024-08-22 | End: 2024-08-22

## 2024-08-22 RX ORDER — ONDANSETRON 2 MG/ML
4 INJECTION INTRAMUSCULAR; INTRAVENOUS
Status: DISCONTINUED | OUTPATIENT
Start: 2024-08-22 | End: 2024-08-22 | Stop reason: HOSPADM

## 2024-08-22 RX ORDER — HYDROMORPHONE HYDROCHLORIDE 1 MG/ML
0.5 INJECTION, SOLUTION INTRAMUSCULAR; INTRAVENOUS; SUBCUTANEOUS EVERY 5 MIN PRN
Status: DISCONTINUED | OUTPATIENT
Start: 2024-08-22 | End: 2024-08-22 | Stop reason: HOSPADM

## 2024-08-22 RX ORDER — SODIUM CHLORIDE, SODIUM LACTATE, POTASSIUM CHLORIDE, CALCIUM CHLORIDE 600; 310; 30; 20 MG/100ML; MG/100ML; MG/100ML; MG/100ML
INJECTION, SOLUTION INTRAVENOUS CONTINUOUS
Status: DISCONTINUED | OUTPATIENT
Start: 2024-08-22 | End: 2024-08-23

## 2024-08-22 RX ORDER — SODIUM CHLORIDE 0.9 % (FLUSH) 0.9 %
5-40 SYRINGE (ML) INJECTION EVERY 12 HOURS SCHEDULED
Status: DISCONTINUED | OUTPATIENT
Start: 2024-08-22 | End: 2024-08-22 | Stop reason: HOSPADM

## 2024-08-22 RX ORDER — SODIUM CHLORIDE, SODIUM LACTATE, POTASSIUM CHLORIDE, CALCIUM CHLORIDE 600; 310; 30; 20 MG/100ML; MG/100ML; MG/100ML; MG/100ML
INJECTION, SOLUTION INTRAVENOUS CONTINUOUS PRN
Status: DISCONTINUED | OUTPATIENT
Start: 2024-08-22 | End: 2024-08-22 | Stop reason: SDUPTHER

## 2024-08-22 RX ORDER — DEXTROSE MONOHYDRATE 100 MG/ML
INJECTION, SOLUTION INTRAVENOUS CONTINUOUS PRN
Status: DISCONTINUED | OUTPATIENT
Start: 2024-08-22 | End: 2024-08-22 | Stop reason: HOSPADM

## 2024-08-22 RX ORDER — DEXAMETHASONE SODIUM PHOSPHATE 4 MG/ML
INJECTION, SOLUTION INTRA-ARTICULAR; INTRALESIONAL; INTRAMUSCULAR; INTRAVENOUS; SOFT TISSUE PRN
Status: DISCONTINUED | OUTPATIENT
Start: 2024-08-22 | End: 2024-08-22 | Stop reason: SDUPTHER

## 2024-08-22 RX ORDER — PHENYLEPHRINE HCL IN 0.9% NACL 0.4MG/10ML
SYRINGE (ML) INTRAVENOUS PRN
Status: DISCONTINUED | OUTPATIENT
Start: 2024-08-22 | End: 2024-08-22 | Stop reason: SDUPTHER

## 2024-08-22 RX ORDER — LIDOCAINE HYDROCHLORIDE 20 MG/ML
INJECTION, SOLUTION EPIDURAL; INFILTRATION; INTRACAUDAL; PERINEURAL PRN
Status: DISCONTINUED | OUTPATIENT
Start: 2024-08-22 | End: 2024-08-22 | Stop reason: SDUPTHER

## 2024-08-22 RX ORDER — IPRATROPIUM BROMIDE AND ALBUTEROL SULFATE 2.5; .5 MG/3ML; MG/3ML
1 SOLUTION RESPIRATORY (INHALATION)
Status: DISCONTINUED | OUTPATIENT
Start: 2024-08-22 | End: 2024-08-22 | Stop reason: HOSPADM

## 2024-08-22 RX ORDER — FENTANYL CITRATE 50 UG/ML
25 INJECTION, SOLUTION INTRAMUSCULAR; INTRAVENOUS EVERY 5 MIN PRN
Status: DISCONTINUED | OUTPATIENT
Start: 2024-08-22 | End: 2024-08-22 | Stop reason: HOSPADM

## 2024-08-22 RX ORDER — SODIUM CHLORIDE 9 MG/ML
INJECTION, SOLUTION INTRAVENOUS PRN
Status: DISCONTINUED | OUTPATIENT
Start: 2024-08-22 | End: 2024-08-22 | Stop reason: HOSPADM

## 2024-08-22 RX ORDER — SODIUM CHLORIDE 0.9 % (FLUSH) 0.9 %
5-40 SYRINGE (ML) INJECTION PRN
Status: DISCONTINUED | OUTPATIENT
Start: 2024-08-22 | End: 2024-08-22 | Stop reason: HOSPADM

## 2024-08-22 RX ORDER — GLUCAGON 1 MG/ML
1 KIT INJECTION PRN
Status: DISCONTINUED | OUTPATIENT
Start: 2024-08-22 | End: 2024-08-22 | Stop reason: HOSPADM

## 2024-08-22 RX ORDER — PROCHLORPERAZINE EDISYLATE 5 MG/ML
5 INJECTION INTRAMUSCULAR; INTRAVENOUS
Status: DISCONTINUED | OUTPATIENT
Start: 2024-08-22 | End: 2024-08-22 | Stop reason: HOSPADM

## 2024-08-22 RX ORDER — PROPOFOL 10 MG/ML
INJECTION, EMULSION INTRAVENOUS PRN
Status: DISCONTINUED | OUTPATIENT
Start: 2024-08-22 | End: 2024-08-22 | Stop reason: SDUPTHER

## 2024-08-22 RX ADMIN — DEXAMETHASONE SODIUM PHOSPHATE 4 MG: 4 INJECTION INTRA-ARTICULAR; INTRALESIONAL; INTRAMUSCULAR; INTRAVENOUS; SOFT TISSUE at 08:33

## 2024-08-22 RX ADMIN — TRAZODONE HYDROCHLORIDE 50 MG: 50 TABLET ORAL at 21:59

## 2024-08-22 RX ADMIN — FENTANYL CITRATE 25 MCG: 50 INJECTION, SOLUTION INTRAMUSCULAR; INTRAVENOUS at 08:34

## 2024-08-22 RX ADMIN — PROPOFOL 50 MG: 10 INJECTION, EMULSION INTRAVENOUS at 08:35

## 2024-08-22 RX ADMIN — ONDANSETRON 4 MG: 2 INJECTION INTRAMUSCULAR; INTRAVENOUS at 08:33

## 2024-08-22 RX ADMIN — SODIUM CHLORIDE, POTASSIUM CHLORIDE, SODIUM LACTATE AND CALCIUM CHLORIDE: 600; 310; 30; 20 INJECTION, SOLUTION INTRAVENOUS at 08:19

## 2024-08-22 RX ADMIN — LIDOCAINE HYDROCHLORIDE 100 MG: 20 INJECTION, SOLUTION EPIDURAL; INFILTRATION; INTRACAUDAL; PERINEURAL at 08:25

## 2024-08-22 RX ADMIN — SODIUM CHLORIDE, PRESERVATIVE FREE 10 ML: 5 INJECTION INTRAVENOUS at 21:59

## 2024-08-22 RX ADMIN — Medication 80 MCG: at 08:40

## 2024-08-22 RX ADMIN — FENTANYL CITRATE 50 MCG: 50 INJECTION, SOLUTION INTRAMUSCULAR; INTRAVENOUS at 08:50

## 2024-08-22 RX ADMIN — OXYCODONE HYDROCHLORIDE 2.5 MG: 5 TABLET ORAL at 16:50

## 2024-08-22 RX ADMIN — FENTANYL CITRATE 50 MCG: 50 INJECTION, SOLUTION INTRAMUSCULAR; INTRAVENOUS at 09:01

## 2024-08-22 RX ADMIN — SODIUM CHLORIDE, POTASSIUM CHLORIDE, SODIUM LACTATE AND CALCIUM CHLORIDE: 600; 310; 30; 20 INJECTION, SOLUTION INTRAVENOUS at 15:09

## 2024-08-22 RX ADMIN — FENTANYL CITRATE 50 MCG: 50 INJECTION, SOLUTION INTRAMUSCULAR; INTRAVENOUS at 08:43

## 2024-08-22 RX ADMIN — SODIUM CHLORIDE, POTASSIUM CHLORIDE, SODIUM LACTATE AND CALCIUM CHLORIDE: 600; 310; 30; 20 INJECTION, SOLUTION INTRAVENOUS at 07:47

## 2024-08-22 RX ADMIN — POTASSIUM CHLORIDE 20 MEQ: 1500 TABLET, EXTENDED RELEASE ORAL at 15:10

## 2024-08-22 RX ADMIN — Medication 200 MCG: at 09:11

## 2024-08-22 RX ADMIN — FENTANYL CITRATE 25 MCG: 50 INJECTION, SOLUTION INTRAMUSCULAR; INTRAVENOUS at 08:35

## 2024-08-22 RX ADMIN — SODIUM CHLORIDE 1000 MG: 900 INJECTION INTRAVENOUS at 08:40

## 2024-08-22 RX ADMIN — PROPOFOL 80 MG: 10 INJECTION, EMULSION INTRAVENOUS at 08:25

## 2024-08-22 RX ADMIN — Medication 80 MCG: at 08:37

## 2024-08-22 RX ADMIN — Medication 40 MCG: at 08:25

## 2024-08-22 ASSESSMENT — PAIN - FUNCTIONAL ASSESSMENT: PAIN_FUNCTIONAL_ASSESSMENT: 0-10

## 2024-08-22 NOTE — OP NOTE
Operative Note      Patient: Ronna Arzola  YOB: 1935  MRN: [id number]    Date of Procedure: 8/22/2024    Pre-Op Diagnosis Codes:      * Malignant neoplasm of urinary bladder, unspecified site (HCC) [C67.9]    Post-Op Diagnosis: Same       Procedure(s):  TRANSURETHRAL RESECTION OF BLADDER TUMOR    Surgeon(s):  Trace Hernandez MD    Assistant:   * No surgical staff found *    Anesthesia: General    Estimated Blood Loss (mL): Minimal    Complications: None    Specimens:   ID Type Source Tests Collected by Time Destination   1 : bladder chips Tissue Tissue SURGICAL PATHOLOGY Trace Hernandez MD 8/22/2024 0859        Implants:  * No implants in log *      Drains: * No LDAs found *    Findings:  Infection Present At Time Of Surgery (PATOS) (choose all levels that have infection present):  No infection present  Other Findings: Detailed Description of Procedure:   The detailed description was dictated on August 22, 2024 #[id number].  See notes for details.        Electronically signed by Trace Hernandez MD on 8/22/2024 at 9:19 AM

## 2024-08-22 NOTE — CARE COORDINATION
Transition of Care Plan:    RUR: 10%  Prior Level of Functioning: independent  Disposition: home with family, declines SNF at this time  If SNF or IPR: Date FOC offered: 8/21  Follow up appointments: PCP, specialists as indicated   DME needed: N/A  Transportation at discharge: family  IM/IMM Medicare/ letter given: to be given  Is patient a [facility] and connected with [facility]? no   If yes, was [facility] transfer form completed and [facility] notified?   Caregiver Contact: Parker Arzola (son) [phone]; [email or web address]  Discharge Caregiver contacted prior to discharge? yes  Care Conference needed? no  Barriers to discharge:  medical clearance    Chart reviewed. CM continuing to follow for discharge planning. Pt not yet medically cleared for discharge s/p urology procedure today. Spoke with pt yesterday regarding recommendation for SNF placement. Pt declining SNF at this time as she prefers to return home. She is agreeable to [facility] services and a RW. CM to arrange prior to d/c. Will continue to follow.    OSITO Snow   Care Manager, [facility]  [phone]

## 2024-08-22 NOTE — PROGRESS NOTES
End of Shift Note    Bedside shift change report given to Rhonda SCHAFFER (oncoming nurse) by Lucita Sorto RN (offgoing nurse).  Report included the following information SBAR, Intake/Output, MAR, and Recent Results    At handoff report pt was already transported to the OR.   Shift worked:  NIGHT     Shift summary and any significant changes:     -Pt slept most of the night   -Labs drawn   -Pt had good urine output, she has passed 1 small blood clot at 0430H    -Labs drawn   -NPO as ordered    -Verbal report via phone given to  OR Nurse (Mira)   Concerns for physician to address:  -     Zone phone for oncoming shift:   -       Activity:     Number times ambulated in hallways past shift: 0  Number of times OOB to chair past shift: 1    Cardiac:   Cardiac Monitoring: No           Access:  Current line(s): PIV     Genitourinary:   Urinary status: voiding and incontinent    Respiratory:      Chronic home O2 use?: NO  Incentive spirometer at bedside: NO       GI:     Current diet:  Diet NPO Exceptions are: Sips of Water with Meds  Passing flatus: YES  Tolerating current diet: YES       Pain Management:   Patient states pain is manageable on current regimen: YES    Skin:     Interventions: turn team, float heels, and increase time out of bed    Patient Safety:  Fall Score:    Interventions: bed/chair alarm, assistive device (walker, cane. etc), gripper socks, pt to call before getting OOB, and stay with me (per policy)       Length of Stay:  Expected LOS: 7  Actual LOS: 4      Lucita Sorto, RN

## 2024-08-22 NOTE — PROGRESS NOTES
Hospitalist Progress Note    NAME:   Ronna Arzola   : 1935   MRN: [id number]     Date/Time: 2024 11:07 AM  Patient PCP: Carmen Aguero MD    Assessment / Plan:    Invasive bladder mass extending consistent with bladder malignancy, sclerotic appearance of the sacrum and left iliac bone concerning for either metastatic disease or Paget's disease.  UTI  Hematuria, resolved    Generalized weakness   -Dysuria and UA + for Nitrate/LE. U Cx w E coli   -Continue Omnicef  -Staging CT chest neg for Mets.   -Urology on board, - plan for TURBT  noted-- NPO p MN  -IR canceled bone Bx as they think the bone lesion is likely Paget dis  : S/P Cystoscopy, transurethral resection for biopsy purposes only. A large invasive bladder cancer noted in the right superior lateral aspect of her bladder.  Urology to follow.  PT and OT to follow.  Will continue IV fluid today and monitor labs.  Due to patient's advanced age and bladder mass palliative will be consulted to discuss goals of care.     Elevated CK level   Gen Weakness  -Off IV fluids.     -TSH is within normal limits. CK back to normal  -PT OT recs SNF   : Repeat CK level is normal.  Replace potassium today.    Vitamin B12 deficiency  -Start vitamin B12 supplementation  -folic acid level wnl    Vitamin D deficiency  ? Paget disease   -Will add vitamin D supplementation  -needs to replete Vit D prior to treating Paget Disease, f/up w PCP as outpt for that   : Bone scan report reviewed.    Hypertension  -Continue lisinopril    Pt request podiatry consult for long toenails     Medical Decision Making:   I personally reviewed labs: CBC, BMP  I personally reviewed imaging:  I personally reviewed EKG:  Toxic drug monitoring:   Discussed case with: pt, RN    Code Status: Full code  DVT Prophylaxis: scds  GI Prophylaxis:    Subjective:     Chief Complaint / Reason for Physician Visit  Patient was quite sleepy when I saw her.  She just came back from  ciprofloxacin <=0.25 ug/mL Sensitive      gentamicin <=1 ug/mL Sensitive      levofloxacin <=0.12 ug/mL Sensitive      meropenem <=0.25 ug/mL Sensitive      nitrofurantoin 32 ug/mL Sensitive      piperacillin-tazobactam <=4 ug/mL Sensitive      tobramycin <=1 ug/mL Sensitive      trimethoprim-sulfamethoxazole <=20 ug/mL Sensitive                                 Total time greater than 35 minutes    Signed: Wallace Doan MD       Disclaimer: Sections of this note are dictated using utilizing voice recognition software, which may have resulted in some phonetic based errors in grammar and contents. Even though attempts were made to correct all the mistakes, some may have been missed, and remained in the body of the document. If questions arise, please contact our department.

## 2024-08-22 NOTE — ANESTHESIA PRE PROCEDURE
Endo/Other:    (+) blood dyscrasia: anemia, arthritis: OA., malignancy/cancer.                 Abdominal:             Vascular: negative vascular ROS.         Other Findings: Abdominal exam deferred            Anesthesia Plan      general     ASA 2       Induction: intravenous.    MIPS: Postoperative opioids intended and Prophylactic antiemetics administered.  Anesthetic plan and risks discussed with patient.    Use of blood products discussed with patient whom consented to blood products.    Plan discussed with CRNA.    Attending anesthesiologist reviewed and agrees with Preprocedure content                Manjeet Stovall MD   8/22/2024

## 2024-08-22 NOTE — PLAN OF CARE
Problem: Physical Therapy - Adult  Goal: By Discharge: Performs mobility at highest level of function for planned discharge setting.  See evaluation for individualized goals.  Description: FUNCTIONAL STATUS PRIOR TO ADMISSION: pt reports mod I PTA, unsure of DME.     HOME SUPPORT PRIOR TO ADMISSION: Per pt, she lives with son in 1 story home. Pt was found down for ~2 days prior to admission while son was on work trip. Found down by neighbor.    Physical Therapy Goals  Initiated 8/18/2024  1.  Patient will move from supine to sit and sit to supine, scoot up and down, and roll side to side in bed with supervision/set-up within 7 day(s).    2.  Patient will perform sit to stand with supervision/set-up within 7 day(s).  3.  Patient will transfer from bed to chair and chair to bed with supervision/set-up using the least restrictive device within 7 day(s).  4.  Patient will ambulate with contact guard assist for 100 feet with the least restrictive device within 7 day(s).   5.  Patient will ascend/descend 3 stairs with B handrail(s) with contact guard assist within 7 day(s).   8/21/2024 1648 by Sp Corona, PT  Outcome: Not Progressing     Problem: Physical Therapy - Adult  Goal: By Discharge: Performs mobility at highest level of function for planned discharge setting.  See evaluation for individualized goals.  Description: FUNCTIONAL STATUS PRIOR TO ADMISSION: pt reports mod I PTA, unsure of DME.     HOME SUPPORT PRIOR TO ADMISSION: Per pt, she lives with son in 1 story home. Pt was found down for ~2 days prior to admission while son was on work trip. Found down by neighbor.    Physical Therapy Goals  Initiated 8/18/2024  1.  Patient will move from supine to sit and sit to supine, scoot up and down, and roll side to side in bed with supervision/set-up within 7 day(s).    2.  Patient will perform sit to stand with supervision/set-up within 7 day(s).  3.  Patient will transfer from bed to chair and chair to bed with  supervision/set-up using the least restrictive device within 7 day(s).  4.  Patient will ambulate with contact guard assist for 100 feet with the least restrictive device within 7 day(s).   5.  Patient will ascend/descend 3 stairs with B handrail(s) with contact guard assist within 7 day(s).   8/21/2024 1648 by Sp Corona, PT  Outcome: Not Progressing

## 2024-08-22 NOTE — PROGRESS NOTES
End of Shift Note    Bedside shift change report given to Trudy JEONG (oncoming nurse) by Paola Burgos RN (offgoing nurse).  Report included the following information SBAR, MAR, Cardiac Rhythm  , and Quality Measures    Shift worked:  7a-7p     Shift summary and any significant changes:     Pt had no complaints of care. Pt urine is bloody..      Concerns for physician to address:  Discharge planning     Zone phone for oncoming shift:   [phone]       Activity:     Number times ambulated in hallways past shift: 0  Number of times OOB to chair past shift: 0    Cardiac:   Cardiac Monitoring: Yes           Access:  Current line(s): PIV     Genitourinary:   Urinary status: voiding    Respiratory:      Chronic home O2 use?: NO  Incentive spirometer at bedside: NO       GI:     Current diet:  ADULT DIET; Regular  Passing flatus: YES  Tolerating current diet: YES       Pain Management:   Patient states pain is manageable on current regimen: N/A    Skin:     Interventions: turn team, specialty bed, float heels, increase time out of bed, and foam dressing    Patient Safety:  Fall Score:    Interventions: bed/chair alarm, assistive device (walker, cane. etc), gripper socks, pt to call before getting OOB, stay with me (per policy), and gait belt       Length of Stay:  Expected LOS: 8  Actual LOS: 4      Paola Burgos RN

## 2024-08-22 NOTE — ANESTHESIA POSTPROCEDURE EVALUATION
Department of Anesthesiology  Postprocedure Note    Patient: Ronna Arzola  MRN: [id number]  YOB: 1935  Date of evaluation: 8/22/2024    Procedure Summary       Date: 08/22/24 Room / Location: [facility] MAIN OR M2 / [facility] MAIN OR    Anesthesia Start: 0819 Anesthesia Stop: 0924    Procedure: TRANSURETHRAL RESECTION OF BLADDER TUMOR (Bladder) Diagnosis:       Malignant neoplasm of urinary bladder, unspecified site (HCC)      (Malignant neoplasm of urinary bladder, unspecified site (HCC) [C67.9])    Providers: Trace Hernandez MD Responsible Provider: Manjeet Stovall MD    Anesthesia Type: General ASA Status: 2            Anesthesia Type: General    Hilton Phase I: Hilton Score: 9    Hilton Phase II:      Anesthesia Post Evaluation    Patient location during evaluation: PACU  Patient participation: complete - patient participated  Level of consciousness: awake and alert  Airway patency: patent  Nausea & Vomiting: no nausea  Cardiovascular status: hemodynamically stable  Respiratory status: acceptable  Hydration status: euvolemic  Multimodal analgesia pain management approach  Pain management: adequate    No notable events documented.

## 2024-08-22 NOTE — FLOWSHEET NOTE
08/22/24 0925   Handoff   Communication Given Transfer Handoff   Handoff phase Phase I receiving   Handoff Given To Nu SCHAFFER   Handoff Received From OR RN and YANI Akhtar CRNA   Handoff Communication Face to Face;At bedside        08/22/24 1000   Handoff   Communication Given Transfer Handoff   Handoff phase Phase I transferring   Handoff Given To Rhonda SCHAFFER   Handoff Received From Nu SCHAFFER   Handoff Communication Telephone   1005 Verbal sign out from Dr. Stovall.

## 2024-08-22 NOTE — OP NOTE
[facility]              [address] [place]  [address]                            OPERATIVE REPORT      PATIENT NAME: BRADY INFANTE               : 1935  MED REC NO: [id number]                       ROOM: OR  ACCOUNT NO: [id number]                       ADMIT DATE: 2024  PROVIDER: Trace Hernandez MD    DATE OF SERVICE:  2024    PREOPERATIVE DIAGNOSES:  History of unresectable bladder cancer.    POSTOPERATIVE DIAGNOSES:  History of unresectable bladder cancer.    PROCEDURES PERFORMED:  Cystoscopy, transurethral resection for biopsy purposes only.  A large invasive bladder cancer noted in the right superior lateral aspect of her bladder.    SURGEON:  Trace Hernandez MD    ASSISTANT:  ***    ANESTHESIA:  General.    ESTIMATED BLOOD LOSS:  Less than 50 mL.    SPECIMENS REMOVED:  Bladder tumor chips.    INTRAOPERATIVE FINDINGS:  ***     COMPLICATIONS:  None.    IMPLANTS:  None.    INDICATIONS:  ***    DESCRIPTION OF PROCEDURE:  Following satisfactory premedication, the patient was induced under general anesthetic and then placed in the dorsal lithotomy position.  She has a history of a probable metastatic bladder cancer and previously diagnosed on imaging but not confirmed pathologically.  She is here for that purpose.    Following a general anesthetic and after prepping and draping her genitalia with Betadine solution, a proper time-out was conducted.  Cystoscopy was initiated 1st with a 21 panendoscope demonstrating significant descensus of the bladder with a grade 2 cystocele.  There is normal mucosa of the left and the floor of the bladder, but the entire right lateral superior and posterior sidewall was occupied with a large apparently invasive appearing tumor.  The cystoscope was then removed and then I placed a resectoscope sheath into the bladder.  Using sterile shell and median, I did several swipes of the tumor simply to establish

## 2024-08-22 NOTE — PROGRESS NOTES
TRANSFER - IN REPORT:    Verbal report received from KARIME Plascencia on Ronna Arzola  being received from [id number] for ordered procedure      Report consisted of patient's Situation, Background, Assessment and   Recommendations(SBAR).     Information from the following report(s) Nurse Handoff Report, MAR, and Recent Results was reviewed with the receiving nurse.    Opportunity for questions and clarification was provided.      Assessment completed upon patient's arrival to unit and care assumed.

## 2024-08-22 NOTE — BRIEF OP NOTE
Brief Postoperative Note      Patient: Ronna Arzola  YOB: 1935  MRN: [id number]    Date of Procedure: 8/22/2024    Pre-Op Diagnosis Codes:      * Malignant neoplasm of urinary bladder, unspecified site (HCC) [C67.9]    Post-Op Diagnosis: Same       Procedure(s):  TRANSURETHRAL RESECTION OF BLADDER TUMOR    Surgeon(s):  Trace Hernandez MD    Assistant:  * No surgical staff found *    Anesthesia: General    Estimated Blood Loss (mL): Minimal    Complications: None    Specimens:   ID Type Source Tests Collected by Time Destination   1 : bladder chips Tissue Tissue SURGICAL PATHOLOGY Trace Hernandez MD 8/22/2024 0859        Implants:  * No implants in log *      Drains: * No LDAs found *    Findings:  Infection Present At Time Of Surgery (PATOS) (choose all levels that have infection present):  No infection present  Other Findings: Large bulky tumor that appears invasive involving the entire superior lateral and posterior aspect of the bladder on the right side.      Dated on August 2, 2024  #[id number]  Electronically signed by Trace Hernandez MD on 8/22/2024 at 9:17 AM

## 2024-08-23 LAB
ANION GAP SERPL CALC-SCNC: 6 MMOL/L (ref 5–15)
BUN SERPL-MCNC: 6 MG/DL (ref 6–20)
BUN/CREAT SERPL: 10 (ref 12–20)
CALCIUM SERPL-MCNC: 8.4 MG/DL (ref 8.5–10.1)
CHLORIDE SERPL-SCNC: 108 MMOL/L (ref 97–108)
CO2 SERPL-SCNC: 26 MMOL/L (ref 21–32)
CREAT SERPL-MCNC: 0.59 MG/DL (ref 0.55–1.02)
GLUCOSE SERPL-MCNC: 110 MG/DL (ref 65–100)
HCT VFR BLD AUTO: 32.6 % (ref 35–47)
HGB BLD-MCNC: 10.6 G/DL (ref 11.5–16)
POTASSIUM SERPL-SCNC: 3.6 MMOL/L (ref 3.5–5.1)
SODIUM SERPL-SCNC: 140 MMOL/L (ref 136–145)

## 2024-08-23 PROCEDURE — 85014 HEMATOCRIT: CPT

## 2024-08-23 PROCEDURE — 2580000003 HC RX 258: Performed by: UROLOGY

## 2024-08-23 PROCEDURE — 36415 COLL VENOUS BLD VENIPUNCTURE: CPT

## 2024-08-23 PROCEDURE — 85018 HEMOGLOBIN: CPT

## 2024-08-23 PROCEDURE — 6370000000 HC RX 637 (ALT 250 FOR IP): Performed by: UROLOGY

## 2024-08-23 PROCEDURE — 80048 BASIC METABOLIC PNL TOTAL CA: CPT

## 2024-08-23 PROCEDURE — 97530 THERAPEUTIC ACTIVITIES: CPT

## 2024-08-23 PROCEDURE — 1100000000 HC RM PRIVATE

## 2024-08-23 PROCEDURE — 6370000000 HC RX 637 (ALT 250 FOR IP): Performed by: INTERNAL MEDICINE

## 2024-08-23 PROCEDURE — 6360000002 HC RX W HCPCS: Performed by: UROLOGY

## 2024-08-23 RX ADMIN — SODIUM CHLORIDE, PRESERVATIVE FREE 10 ML: 5 INJECTION INTRAVENOUS at 21:23

## 2024-08-23 RX ADMIN — LISINOPRIL 10 MG: 5 TABLET ORAL at 10:02

## 2024-08-23 RX ADMIN — CYANOCOBALAMIN TAB 500 MCG 1000 MCG: 500 TAB at 10:02

## 2024-08-23 RX ADMIN — TRAZODONE HYDROCHLORIDE 50 MG: 50 TABLET ORAL at 22:25

## 2024-08-23 RX ADMIN — OXYCODONE HYDROCHLORIDE 2.5 MG: 5 TABLET ORAL at 22:25

## 2024-08-23 RX ADMIN — SODIUM CHLORIDE 1000 MG: 900 INJECTION INTRAVENOUS at 10:02

## 2024-08-23 RX ADMIN — Medication 5000 UNITS: at 10:02

## 2024-08-23 RX ADMIN — SODIUM CHLORIDE, PRESERVATIVE FREE 10 ML: 5 INJECTION INTRAVENOUS at 10:04

## 2024-08-23 ASSESSMENT — PAIN SCALES - GENERAL: PAINLEVEL_OUTOF10: 8

## 2024-08-23 NOTE — CARE COORDINATION
Pt agreeable to SNF placement at this time. Met with pt/family at the bedside today. Provided SNF list, offered freedom of choice. Referrals sent to [facility], [facility] and [facility]. Pending response. Anticipate d/c on Monday. Will continue to follow.    OSITO Snow   Care Manager, [facility]  [phone]

## 2024-08-23 NOTE — CONSULTS
Palliative Medicine  Patient Name: Ronna Arzola  YOB: 1935  MRN: [id number]  Age: 89 y.o.  Gender: female    Date of Initial Consult: 8/23/2024  Date of Service: 8/23/2024  Time: 12:49 PM  Provider: Marva Ken MD  Hospital Day: 8  Admit Date: 8/16/2024  Referring Provider: Hospitalist      Reasons for Consultation:  Overwhelming Symptoms    HISTORY OF PRESENT ILLNESS (HPI):   Ronna Arzola is a 89 y.o. female with a past medical history of hypertension, several months of generalized weakness, slowly declining functional status, worsening lower abdominal pain over the last several weeks, who was admitted on 8/16/2024 from home.     Hospital course-found to have large, invasive bladder mass with extension into the pelvis and bone, status post biopsy on 8/22/2024.  Working diagnosis is bladder malignancy.    Psychosocial: Patient is alert and oriented, lives at home independently but her oldest son Jesu also lives with her most of the time, she does not drive.  She has not been able to cook or clean, ADLs have been getting more more difficult due to pain and debility.  Her younger son Parker and his wife have recently been getting involved and helping her out.        PALLIATIVE DIAGNOSES:    Lower abdominal pain-bladder mass related  Likely diagnosis of bladder cancer    ASSESSMENT AND PLAN:   Today, I am treating Ronna Arzola for lower abdominal pain which is in severe progression .  Bladder pain-currently on oxycodone 2.5 mg every 6 hours as needed, she had 1 dose last night and found it very useful.  Continue the same.  Recommend continuing the same even when she goes to rehab.  Bowel regimen to prevent constipation  Goals of care-met with patient, both sons Tanner Nails and daughter-in-law Veronica at bedside.  Assessed their understanding of events leading up to hospitalization, what has been shared in the hospital, etc.  Overall aware that she most likely has bladder cancer but the  Depression: Depression Score: Not depressed        LAB AND IMAGING FINDINGS:   Objective data reviewed:  labs, images, records, medication use, vitals, and chart     FINAL COMMENTS   Thank you for allowing Palliative Medicine to participate in the care of Ronna Arzola.    Only check if applicable and billing time based rather than MDM  [] The total encounter time on this service date was ____ minutes which was spent performing a face-to-face encounter and personally completing the provider-level activities documented in the note. This includes time spent prior to the visit and after the visit in direct care of the patient. This time does not include time spent in any separately reportable services.    Electronically signed by   Marva Ken MD  Palliative Care Team  on 8/23/2024 at 12:49 PM

## 2024-08-23 NOTE — PROGRESS NOTES
Hospitalist Progress Note    NAME:   Ronna Arzola   : 1935   MRN: [id number]     Date/Time: 2024 12:15 PM  Patient PCP: Carmen Aguero MD    Assessment / Plan:    Invasive bladder mass extending consistent with bladder malignancy, sclerotic appearance of the sacrum and left iliac bone concerning for either metastatic disease or Paget's disease.  UTI  Hematuria, resolved    Generalized weakness   -Dysuria and UA + for Nitrate/LE. U Cx w E coli   -Continue Omnicef  -Staging CT chest neg for Mets.   -Urology on board, - plan for TURBT  noted-- NPO p MN  -IR canceled bone Bx as they think the bone lesion is likely Paget dis  : S/P Cystoscopy, transurethral resection for biopsy purposes only. A large invasive bladder cancer noted in the right superior lateral aspect of her bladder.  Urology to follow.  PT and OT to follow.  Will continue IV fluid today and monitor labs.  Due to patient's advanced age and bladder mass palliative will be consulted to discuss goals of care.  : Hematuria is clearing up.  Discussed with urology PA: Will discontinue catheter and monitor.  Postvoiding bladder scan will be done.  Discussed with nursing.  Anemia stable.  Discontinue IV fluid.  Continue IV antibiotic here and can be changed to Cipro upon discharge.  Urologist to follow this patient outpatient to discuss biopsy report.  Family is aware of it.  Pending palliative care consult.  PT and OT recommended SNF placement.   is working on it possibly tomorrow.     Elevated CK level   Gen Weakness  -Off IV fluids.     -TSH is within normal limits. CK back to normal  -PT OT recs SNF   : Repeat CK level is normal.  Replace potassium today.    Vitamin B12 deficiency  -Start vitamin B12 supplementation  -folic acid level wnl    Vitamin D deficiency  ? Paget disease   -Will add vitamin D supplementation  -needs to replete Vit D prior to treating Paget Disease, f/up w PCP as outpt for that  ampicillin-sulbactam <=2 ug/mL Sensitive      ceFAZolin <=4 ug/mL Sensitive      cefepime <=1 ug/mL Sensitive      cefOXitin 8 ug/mL Sensitive      cefTAZidime <=1 ug/mL Sensitive      cefTRIAXone <=1 ug/mL Sensitive      ciprofloxacin <=0.25 ug/mL Sensitive      gentamicin <=1 ug/mL Sensitive      levofloxacin <=0.12 ug/mL Sensitive      meropenem <=0.25 ug/mL Sensitive      nitrofurantoin 32 ug/mL Sensitive      piperacillin-tazobactam <=4 ug/mL Sensitive      tobramycin <=1 ug/mL Sensitive      trimethoprim-sulfamethoxazole <=20 ug/mL Sensitive                                 Total time greater than 35 minutes    Signed: Wallace Doan MD       Disclaimer: Sections of this note are dictated using utilizing voice recognition software, which may have resulted in some phonetic based errors in grammar and contents. Even though attempts were made to correct all the mistakes, some may have been missed, and remained in the body of the document. If questions arise, please contact our department.

## 2024-08-23 NOTE — PLAN OF CARE
Problem: Physical Therapy - Adult  Goal: By Discharge: Performs mobility at highest level of function for planned discharge setting.  See evaluation for individualized goals.  Description: FUNCTIONAL STATUS PRIOR TO ADMISSION: pt reports mod I PTA, unsure of DME.     HOME SUPPORT PRIOR TO ADMISSION: Per pt, she lives with son in 1 story home. Pt was found down for ~2 days prior to admission while son was on work trip. Found down by neighbor.    Physical Therapy Goals  Initiated 8/18/2024; Reassessed 8/23/24 - current goals still appropriate.  1.  Patient will move from supine to sit and sit to supine, scoot up and down, and roll side to side in bed with supervision/set-up within 7 day(s).   2.  Patient will perform sit to stand with supervision/set-up within 7 day(s).  3.  Patient will transfer from bed to chair and chair to bed with supervision/set-up using the least restrictive device within 7 day(s).  4.  Patient will ambulate with contact guard assist for 100 feet with the least restrictive device within 7 day(s).   5.  Patient will ascend/descend 3 stairs with B handrail(s) with contact guard assist within 7 day(s).   Outcome: Progressing   PHYSICAL THERAPY TREATMENT: WEEKLY REASSESSMENT    Patient: Ronna Arzola (89 y.o. female)  Date: 8/23/2024  Primary Diagnosis: Dehydration [E86.0]  UTI (urinary tract infection) [N39.0]  Acute cystitis without hematuria [N30.00]  Fall, initial encounter [W19.XXXA]  B12 deficiency [E53.8]  Procedure(s) (LRB):  TRANSURETHRAL RESECTION OF BLADDER TUMOR (N/A) 1 Day Post-Op   Precautions: Fall Risk, Up as Tolerated, Bed Alarm                      ASSESSMENT :  Patient continues to benefit from skilled PT services and is slowly progressing towards goals. The patient was lying in bed when PT arrived. She is s/p transurethral resection of bladder tumor yesterday. She continues to be limited by decreased strength, decreased standing balance with increased risk for falls,

## 2024-08-23 NOTE — PROGRESS NOTES
Comprehensive Nutrition Assessment    Type and Reason for Visit:  RD Nutrition Re-Screen/LOS    Nutrition Recommendations/Plan:   Continue current diet  Ensure plus HP 1x/day  Monitor and record PO intakes, supplement acceptance, and Bms in I/Os     Malnutrition Assessment:  Malnutrition Status:  No malnutrition (08/23/24 1132)    Context:  Acute Illness     Findings of the 6 clinical characteristics of malnutrition:  Energy Intake:  Mild decrease in energy intake (Comment)  Weight Loss:  No significant weight loss     Body Fat Loss:  No significant body fat loss     Muscle Mass Loss:  No significant muscle mass loss    Fluid Accumulation:  No significant fluid accumulation     Strength:  Not Performed    Nutrition Assessment:    Admitted for UTU, +bladder mass. PMHx includes bladder tumor, HTN, vitamin B deficiency, vitamin D deficiency. Screened for LOS. Intakes fair, ~50% of meals. No recent significant weight loss. Plan to intiate ONS to help meet needs, continue diet.    Nutrition Related Findings:    Labs: Na 140, K 3.6, BUN 6, Creat 0.59, Gluc 110. Meds: vitamin B-12, vitamin D. No edema. BM 8/22. Wound Type: None       Current Nutrition Intake & Therapies:    Average Meal Intake: 51-75%, 26-50%  Average Supplements Intake: None Ordered  ADULT DIET; Regular    Anthropometric Measures:  Height: 152.4 cm (5')  Ideal Body Weight (IBW): 100 lbs (45 kg)    Current Body Weight: 80.6 kg (177 lb 11.1 oz), 177.7 % IBW. Weight Source: Other (Comment) (\"actual\")  Current BMI (kg/m2): 34.7  BMI Categories: Obese Class 1 (BMI 30.0-34.9)    Nutrition Diagnosis:   No nutrition diagnosis at this time     Nutrition Interventions:   Food and/or Nutrient Delivery: Continue Current Diet  Nutrition Education/Counseling: No recommendation at this time  Coordination of Nutrition Care: Continue to monitor while inpatient    Goals:  Goals: Meet at least 75% of estimated needs, by next RD assessment    Nutrition Monitoring and  Evaluation:   Behavioral-Environmental Outcomes: None Identified  Food/Nutrient Intake Outcomes: Food and Nutrient Intake  Physical Signs/Symptoms Outcomes: Meal Time Behavior, Weight    Discharge Planning:    Too soon to determine     Nadiya Gallo RD  Contact: [phone]

## 2024-08-23 NOTE — PROGRESS NOTES
Progress Note    Patient: Ronna Arzola MRN: [id number]  SSN: [id number]    YOB: 1935  Age: 89 y.o.  Sex: female          ADMITTED:  2024 to Wallace Doan MD  for Dehydration [E86.0]  UTI (urinary tract infection) [N39.0]  Acute cystitis without hematuria [N30.00]  Fall, initial encounter [W19.XXXA]  B12 deficiency [E53.8]           Ronna Arzola is 1 Day Post-Op Procedure(s):  TRANSURETHRAL RESECTION OF BLADDER TUMOR.  She is doing well.   She has no pain.  She has no nausea.  She is tolerating a solid diet. Indwelling catheter is draining well.  García clearing,  urine tea colored. No issues with clots. Pending pathology.         Vitals:  Temp (24hrs), Av.8 °F (36.6 °C), Min:97.5 °F (36.4 °C), Max:98.1 °F (36.7 °C)     Blood pressure (!) 138/45, pulse 74, temperature 98.1 °F (36.7 °C), temperature source Oral, resp. rate 16, height 1.524 m (5'), weight 80.6 kg (177 lb 11.1 oz), SpO2 100%.      I&O's:   1901 -  0700  In: 1050 [P.O.:450; I.V.:600]  Out: 550 [Urine:550]   No intake/output data recorded.     Exam:   abdomen soft, appropriately TTP at surgical sites.  All incisions clean/dry/intact without evidence of infection.  SHOLA with serosanguinous drainage.  García with clear urine output.     Labs:   Recent Labs     24  0615 24  0155   WBC 6.6  --    HGB 11.0* 10.6*   HCT 34.1* 32.6*     --      Recent Labs     24  0428 24  0155    140   K 3.3* 3.6    108   CO2 25 26   BUN 5* 6        Cultures:        Imaging:       Assessment:     - 1 Day Post-Op Procedure(s):  TRANSURETHRAL RESECTION OF BLADDER TUMOR    Principal Problem:    UTI (urinary tract infection)  Active Problems:    B12 deficiency  Resolved Problems:    * No resolved hospital problems. *    Bladder Mass - ?T3 grade bladder cancer with muscle and fat invasion.  UTI    Plan:     - okay for discharge from  standpoint, op follow up to

## 2024-08-24 LAB
ALBUMIN SERPL-MCNC: 2.1 G/DL (ref 3.5–5)
ALBUMIN/GLOB SERPL: 0.6 (ref 1.1–2.2)
ALP SERPL-CCNC: 74 U/L (ref 45–117)
ALT SERPL-CCNC: 20 U/L (ref 12–78)
ANION GAP SERPL CALC-SCNC: 2 MMOL/L (ref 5–15)
AST SERPL-CCNC: 13 U/L (ref 15–37)
BASOPHILS # BLD: 0.1 K/UL (ref 0–0.1)
BASOPHILS NFR BLD: 1 % (ref 0–1)
BILIRUB SERPL-MCNC: 0.6 MG/DL (ref 0.2–1)
BUN SERPL-MCNC: 8 MG/DL (ref 6–20)
BUN/CREAT SERPL: 12 (ref 12–20)
CALCIUM SERPL-MCNC: 8.4 MG/DL (ref 8.5–10.1)
CHLORIDE SERPL-SCNC: 108 MMOL/L (ref 97–108)
CO2 SERPL-SCNC: 30 MMOL/L (ref 21–32)
CREAT SERPL-MCNC: 0.65 MG/DL (ref 0.55–1.02)
DIFFERENTIAL METHOD BLD: ABNORMAL
EOSINOPHIL # BLD: 0.3 K/UL (ref 0–0.4)
EOSINOPHIL NFR BLD: 5 % (ref 0–7)
ERYTHROCYTE [DISTWIDTH] IN BLOOD BY AUTOMATED COUNT: 13.9 % (ref 11.5–14.5)
GLOBULIN SER CALC-MCNC: 3.5 G/DL (ref 2–4)
GLUCOSE SERPL-MCNC: 110 MG/DL (ref 65–100)
HCT VFR BLD AUTO: 34.2 % (ref 35–47)
HGB BLD-MCNC: 10.8 G/DL (ref 11.5–16)
IMM GRANULOCYTES # BLD AUTO: 0 K/UL (ref 0–0.04)
IMM GRANULOCYTES NFR BLD AUTO: 1 % (ref 0–0.5)
LYMPHOCYTES # BLD: 1.7 K/UL (ref 0.8–3.5)
LYMPHOCYTES NFR BLD: 24 % (ref 12–49)
MAGNESIUM SERPL-MCNC: 2.1 MG/DL (ref 1.6–2.4)
MCH RBC QN AUTO: 28.8 PG (ref 26–34)
MCHC RBC AUTO-ENTMCNC: 31.6 G/DL (ref 30–36.5)
MCV RBC AUTO: 91.2 FL (ref 80–99)
MONOCYTES # BLD: 0.8 K/UL (ref 0–1)
MONOCYTES NFR BLD: 11 % (ref 5–13)
NEUTS SEG # BLD: 4.2 K/UL (ref 1.8–8)
NEUTS SEG NFR BLD: 59 % (ref 32–75)
NRBC # BLD: 0 K/UL (ref 0–0.01)
NRBC BLD-RTO: 0 PER 100 WBC
PLATELET # BLD AUTO: 237 K/UL (ref 150–400)
PMV BLD AUTO: 9.4 FL (ref 8.9–12.9)
POTASSIUM SERPL-SCNC: 3.4 MMOL/L (ref 3.5–5.1)
PROT SERPL-MCNC: 5.6 G/DL (ref 6.4–8.2)
RBC # BLD AUTO: 3.75 M/UL (ref 3.8–5.2)
SODIUM SERPL-SCNC: 140 MMOL/L (ref 136–145)
WBC # BLD AUTO: 7.1 K/UL (ref 3.6–11)

## 2024-08-24 PROCEDURE — 6370000000 HC RX 637 (ALT 250 FOR IP): Performed by: UROLOGY

## 2024-08-24 PROCEDURE — 36415 COLL VENOUS BLD VENIPUNCTURE: CPT

## 2024-08-24 PROCEDURE — 6370000000 HC RX 637 (ALT 250 FOR IP): Performed by: INTERNAL MEDICINE

## 2024-08-24 PROCEDURE — 80053 COMPREHEN METABOLIC PANEL: CPT

## 2024-08-24 PROCEDURE — 6360000002 HC RX W HCPCS: Performed by: UROLOGY

## 2024-08-24 PROCEDURE — 83735 ASSAY OF MAGNESIUM: CPT

## 2024-08-24 PROCEDURE — 1100000000 HC RM PRIVATE

## 2024-08-24 PROCEDURE — 2580000003 HC RX 258: Performed by: UROLOGY

## 2024-08-24 PROCEDURE — 85025 COMPLETE CBC W/AUTO DIFF WBC: CPT

## 2024-08-24 RX ADMIN — Medication 5000 UNITS: at 14:04

## 2024-08-24 RX ADMIN — SODIUM CHLORIDE 1000 MG: 900 INJECTION INTRAVENOUS at 14:20

## 2024-08-24 RX ADMIN — POTASSIUM BICARBONATE 40 MEQ: 782 TABLET, EFFERVESCENT ORAL at 14:04

## 2024-08-24 RX ADMIN — SODIUM CHLORIDE, PRESERVATIVE FREE 10 ML: 5 INJECTION INTRAVENOUS at 19:53

## 2024-08-24 RX ADMIN — SODIUM CHLORIDE, PRESERVATIVE FREE 10 ML: 5 INJECTION INTRAVENOUS at 14:23

## 2024-08-24 RX ADMIN — CYANOCOBALAMIN TAB 500 MCG 1000 MCG: 500 TAB at 14:04

## 2024-08-24 RX ADMIN — LISINOPRIL 10 MG: 5 TABLET ORAL at 14:04

## 2024-08-24 ASSESSMENT — PAIN SCALES - GENERAL
PAINLEVEL_OUTOF10: 0
PAINLEVEL_OUTOF10: 0

## 2024-08-24 NOTE — PLAN OF CARE
Pt in stable condition.  A/O, oob to chair this AM, tolerated well X1 assist w/ walker.  Denies c/o pain.  Incontinent of urine, adequate urine output.  VSS, no s/sx of infection observed.  Cont. Plan of care.

## 2024-08-24 NOTE — PROGRESS NOTES
Hospitalist Progress Note    NAME:   Ronna Arzola   : 1935   MRN: [id number]     Date/Time: 2024 9:25 AM  Patient PCP: Carmen Aguero MD    Assessment / Plan:    Invasive bladder mass extending consistent with bladder malignancy, sclerotic appearance of the sacrum and left iliac bone concerning for either metastatic disease or Paget's disease.  UTI  Hematuria, resolved    Generalized weakness   -Dysuria and UA + for Nitrate/LE. U Cx w E coli   -Continue Omnicef  -Staging CT chest neg for Mets.   -Urology on board, - plan for TURBT  noted-- NPO p MN  -IR canceled bone Bx as they think the bone lesion is likely Paget dis  : S/P Cystoscopy, transurethral resection for biopsy purposes only. A large invasive bladder cancer noted in the right superior lateral aspect of her bladder.  Urology to follow.  PT and OT to follow.  Will continue IV fluid today and monitor labs.  Due to patient's advanced age and bladder mass palliative will be consulted to discuss goals of care.  : Hematuria is clearing up.  Discussed with urology PA: Will discontinue catheter and monitor.  Postvoiding bladder scan will be done.  Discussed with nursing.  Anemia stable.  Discontinue IV fluid.  Continue IV antibiotic here and can be changed to Cipro upon discharge.  Urologist to follow this patient outpatient to discuss biopsy report.  Family is aware of it.  Pending palliative care consult.  PT and OT recommended SNF placement.  Stable for discharge, pending authorization, likely discharge on Monday.     Elevated CK level   Gen Weakness  -Off IV fluids.     -TSH is within normal limits. CK back to normal  -PT OT recs SNF   : Repeat CK level is normal.  Replace potassium today.    Vitamin B12 deficiency  -Start vitamin B12 supplementation  -folic acid level wnl    Vitamin D deficiency  ? Paget disease   -Will add vitamin D supplementation  -needs to replete Vit D prior to treating Paget Disease, f/up w PCP  YES  PMH/SH reviewed - no change compared to H&P    Procedures: see electronic medical records for all procedures/Xrays and details which were not copied into this note but were reviewed prior to creation of Plan.      LABS:  I reviewed today's most current labs and imaging studies.  Pertinent labs include:  Recent Labs     08/22/24  0615 08/23/24  0155 08/24/24  0643   WBC 6.6  --  7.1   HGB 11.0* 10.6* 10.8*   HCT 34.1* 32.6* 34.2*     --  237     Recent Labs     08/22/24  0428 08/23/24  0155 08/24/24  0643    140 140   K 3.3* 3.6 3.4*    108 108   CO2 25 26 30   GLUCOSE 107* 110* 110*   BUN 5* 6 8   CREATININE 0.58 0.59 0.65   CALCIUM 8.5 8.4* 8.4*   MG  --   --  2.1   BILITOT  --   --  0.6   AST  --   --  13*   ALT  --   --  20     NM BONE SCAN WHOLE BODY  Narrative: EXAM: Whole Body Nuclear Bone Scan is performed with IV injection of 20.7 mCi  technetium 99m.    INDICATION: bladder tumor, suspect bone mets     Comparison Bone Scan: None.  Correlation Imaging Studies: CT CAP.    TECHNIQUE: Anterior and posterior whole body scintigraphic planar images are  obtained.    FINDINGS:  Mild increased activity diffusely in the left hemipelvis is most consistent with  Paget's disease.  There is mild multifocal degenerative type activity in the spine and shoulders  There is no evidence of fracture, osteomyelitis or bone tumor.  There is no pattern of skeletal metastases.  Impression: Left hemipelvic Paget's disease. No pattern of skeletal metastases.    Electronically signed by ELVIE Red Mapache    Results       Procedure Component Value Units Date/Time    Culture, Urine [[id number]]  (Abnormal)  (Susceptibility) Collected: 08/16/24 1616    Order Status: Completed Specimen: Urine Updated: 08/18/24 1413     Special Requests --        NO SPECIAL REQUESTS  Reflexed from [id number]       [place] count --        >100,000  COLONIES/mL       Culture       Escherichia coli (PREDOMINATING)                  MIXED

## 2024-08-24 NOTE — PROGRESS NOTES
End of Shift Note    Bedside shift change report given to Gabby SCHAFFER (oncoming nurse) by Paola Burgos RN (offgoing nurse).  Report included the following information SBAR, Recent Results, Cardiac Rhythm  , and Quality Measures    Shift worked:  3p-7p     Shift summary and any significant changes:     Pt in room with no complaints. Care is still progressing.      Concerns for physician to address:       Zone phone for oncoming shift:          Activity:     Number times ambulated in hallways past shift: 0  Number of times OOB to chair past shift: 0    Cardiac:   Cardiac Monitoring: Yes           Access:  Current line(s): PIV     Genitourinary:   Urinary status: voiding and incontinent    Respiratory:      Chronic home O2 use?: NO  Incentive spirometer at bedside: NO       GI:     Current diet:  ADULT DIET; Regular  ADULT ORAL NUTRITION SUPPLEMENT; Breakfast; Standard High Calorie/High Protein Oral Supplement  Passing flatus: YES  Tolerating current diet: YES       Pain Management:   Patient states pain is manageable on current regimen: YES    Skin:     Interventions: float heels, increase time out of bed, PT/OT consult, and limit briefs    Patient Safety:  Fall Score:    Interventions: bed/chair alarm, assistive device (walker, cane. etc), gripper socks, and pt to call before getting OOB       Length of Stay:  Expected LOS: 10  Actual LOS: 6      Paola Burgos RN

## 2024-08-25 LAB
ANION GAP SERPL CALC-SCNC: 5 MMOL/L (ref 5–15)
BASOPHILS # BLD: 0 K/UL (ref 0–0.1)
BASOPHILS NFR BLD: 1 % (ref 0–1)
BUN SERPL-MCNC: 8 MG/DL (ref 6–20)
BUN/CREAT SERPL: 14 (ref 12–20)
CALCIUM SERPL-MCNC: 8.7 MG/DL (ref 8.5–10.1)
CHLORIDE SERPL-SCNC: 108 MMOL/L (ref 97–108)
CO2 SERPL-SCNC: 27 MMOL/L (ref 21–32)
CREAT SERPL-MCNC: 0.58 MG/DL (ref 0.55–1.02)
DIFFERENTIAL METHOD BLD: ABNORMAL
EOSINOPHIL # BLD: 0.3 K/UL (ref 0–0.4)
EOSINOPHIL NFR BLD: 4 % (ref 0–7)
ERYTHROCYTE [DISTWIDTH] IN BLOOD BY AUTOMATED COUNT: 13.7 % (ref 11.5–14.5)
GLUCOSE SERPL-MCNC: 111 MG/DL (ref 65–100)
HCT VFR BLD AUTO: 36.1 % (ref 35–47)
HGB BLD-MCNC: 11.4 G/DL (ref 11.5–16)
IMM GRANULOCYTES # BLD AUTO: 0 K/UL (ref 0–0.04)
IMM GRANULOCYTES NFR BLD AUTO: 0 % (ref 0–0.5)
LYMPHOCYTES # BLD: 1.4 K/UL (ref 0.8–3.5)
LYMPHOCYTES NFR BLD: 20 % (ref 12–49)
MAGNESIUM SERPL-MCNC: 2.1 MG/DL (ref 1.6–2.4)
MCH RBC QN AUTO: 28.6 PG (ref 26–34)
MCHC RBC AUTO-ENTMCNC: 31.6 G/DL (ref 30–36.5)
MCV RBC AUTO: 90.5 FL (ref 80–99)
MONOCYTES # BLD: 0.7 K/UL (ref 0–1)
MONOCYTES NFR BLD: 10 % (ref 5–13)
NEUTS SEG # BLD: 4.6 K/UL (ref 1.8–8)
NEUTS SEG NFR BLD: 65 % (ref 32–75)
NRBC # BLD: 0 K/UL (ref 0–0.01)
NRBC BLD-RTO: 0 PER 100 WBC
PLATELET # BLD AUTO: 261 K/UL (ref 150–400)
PMV BLD AUTO: 9.6 FL (ref 8.9–12.9)
POTASSIUM SERPL-SCNC: 3.5 MMOL/L (ref 3.5–5.1)
RBC # BLD AUTO: 3.99 M/UL (ref 3.8–5.2)
SODIUM SERPL-SCNC: 140 MMOL/L (ref 136–145)
WBC # BLD AUTO: 7 K/UL (ref 3.6–11)

## 2024-08-25 PROCEDURE — 6370000000 HC RX 637 (ALT 250 FOR IP): Performed by: UROLOGY

## 2024-08-25 PROCEDURE — 2580000003 HC RX 258: Performed by: UROLOGY

## 2024-08-25 PROCEDURE — 83735 ASSAY OF MAGNESIUM: CPT

## 2024-08-25 PROCEDURE — 85025 COMPLETE CBC W/AUTO DIFF WBC: CPT

## 2024-08-25 PROCEDURE — 80048 BASIC METABOLIC PNL TOTAL CA: CPT

## 2024-08-25 PROCEDURE — 36415 COLL VENOUS BLD VENIPUNCTURE: CPT

## 2024-08-25 PROCEDURE — 6360000002 HC RX W HCPCS: Performed by: UROLOGY

## 2024-08-25 PROCEDURE — 1100000000 HC RM PRIVATE

## 2024-08-25 PROCEDURE — 6370000000 HC RX 637 (ALT 250 FOR IP): Performed by: INTERNAL MEDICINE

## 2024-08-25 PROCEDURE — 6360000002 HC RX W HCPCS: Performed by: INTERNAL MEDICINE

## 2024-08-25 RX ORDER — AMLODIPINE BESYLATE 5 MG/1
2.5 TABLET ORAL DAILY
Status: DISCONTINUED | OUTPATIENT
Start: 2024-08-25 | End: 2024-08-26 | Stop reason: HOSPADM

## 2024-08-25 RX ORDER — LISINOPRIL 5 MG/1
10 TABLET ORAL DAILY
Status: DISCONTINUED | OUTPATIENT
Start: 2024-08-25 | End: 2024-08-26 | Stop reason: HOSPADM

## 2024-08-25 RX ADMIN — SODIUM CHLORIDE 1000 MG: 900 INJECTION INTRAVENOUS at 13:04

## 2024-08-25 RX ADMIN — HYDRALAZINE HYDROCHLORIDE 5 MG: 20 INJECTION, SOLUTION INTRAMUSCULAR; INTRAVENOUS at 17:23

## 2024-08-25 RX ADMIN — TRAZODONE HYDROCHLORIDE 50 MG: 50 TABLET ORAL at 21:16

## 2024-08-25 RX ADMIN — OXYCODONE HYDROCHLORIDE 2.5 MG: 5 TABLET ORAL at 21:16

## 2024-08-25 RX ADMIN — SODIUM CHLORIDE, PRESERVATIVE FREE 10 ML: 5 INJECTION INTRAVENOUS at 09:43

## 2024-08-25 RX ADMIN — CYANOCOBALAMIN TAB 500 MCG 1000 MCG: 500 TAB at 09:40

## 2024-08-25 RX ADMIN — AMLODIPINE BESYLATE 2.5 MG: 5 TABLET ORAL at 15:30

## 2024-08-25 RX ADMIN — LISINOPRIL 10 MG: 5 TABLET ORAL at 09:38

## 2024-08-25 RX ADMIN — Medication 5000 UNITS: at 09:35

## 2024-08-25 RX ADMIN — SODIUM CHLORIDE, PRESERVATIVE FREE 10 ML: 5 INJECTION INTRAVENOUS at 21:16

## 2024-08-25 ASSESSMENT — PAIN SCALES - GENERAL
PAINLEVEL_OUTOF10: 7
PAINLEVEL_OUTOF10: 7

## 2024-08-25 NOTE — PLAN OF CARE
Problem: Safety - Adult  Goal: Free from fall injury  8/25/2024 0520 by Gabby Quarles RN  Outcome: Progressing  8/25/2024 0520 by Gabby Quarles RN  Outcome: Progressing  8/24/2024 1603 by Harriet Morocho RN  Outcome: Progressing     Problem: Discharge Planning  Goal: Discharge to home or other facility with appropriate resources  8/25/2024 0520 by Gabby Quarles RN  Outcome: Progressing  8/25/2024 0520 by Gabby Quarles RN  Outcome: Progressing  8/24/2024 1603 by Harriet Morocho RN  Outcome: Progressing     Problem: ABCDS Injury Assessment  Goal: Absence of physical injury  8/25/2024 0520 by Gabby Quarles RN  Outcome: Progressing  8/25/2024 0520 by Gabby Quarles RN  Outcome: Progressing  8/24/2024 1603 by Harriet Morocho RN  Outcome: Progressing     Problem: Skin/Tissue Integrity  Goal: Absence of new skin breakdown  Description: 1.  Monitor for areas of redness and/or skin breakdown  2.  Assess vascular access sites hourly  3.  Every 4-6 hours minimum:  Change oxygen saturation probe site  4.  Every 4-6 hours:  If on nasal continuous positive airway pressure, respiratory therapy assess nares and determine need for appliance change or resting period.  8/25/2024 0520 by Gabby Quarles RN  Outcome: Progressing  8/25/2024 0520 by Gabby Quarles RN  Outcome: Progressing  8/24/2024 1603 by Harriet Morocho RN  Outcome: Progressing     Problem: Pain  Goal: Verbalizes/displays adequate comfort level or baseline comfort level  8/25/2024 0520 by Gabby Quarles RN  Outcome: Progressing  8/25/2024 0520 by Gabby Quarles RN  Outcome: Progressing  8/24/2024 1603 by aHrriet Morocho RN  Outcome: Progressing

## 2024-08-25 NOTE — PROGRESS NOTES
Hospitalist Progress Note    NAME:   Ronna Arzola   : 1935   MRN: [id number]     Date/Time: 2024 3:02 PM  Patient PCP: Carmen Aguero MD    Assessment / Plan:    Invasive bladder mass extending consistent with bladder malignancy, sclerotic appearance of the sacrum and left iliac bone concerning for either metastatic disease or Paget's disease.  UTI  Hematuria, resolved    Generalized weakness   -Dysuria and UA + for Nitrate/LE. U Cx w E coli   -Continue Omnicef  -Staging CT chest neg for Mets.   -Urology on board, - plan for TURBT  noted-- NPO p MN  -IR canceled bone Bx as they think the bone lesion is likely Paget dis  : S/P Cystoscopy, transurethral resection for biopsy purposes only. A large invasive bladder cancer noted in the right superior lateral aspect of her bladder.  Urology to follow.  PT and OT to follow.  Will continue IV fluid today and monitor labs.  Due to patient's advanced age and bladder mass palliative will be consulted to discuss goals of care.  : Hematuria is clearing up.  Discussed with urology PA: Will discontinue catheter and monitor.  Postvoiding bladder scan will be done.  Discussed with nursing.  Anemia stable.  Discontinue IV fluid.  Continue IV antibiotic here and can be changed to Cipro upon discharge.  Urologist to follow this patient outpatient to discuss biopsy report.  Family is aware of it.  Pending palliative care consult.  PT and OT recommended SNF placement.  Stable for discharge, pending authorization, likely discharge on Monday.   : No acute issues  Elevated CK level   Gen Weakness  -Off IV fluids.     -TSH is within normal limits. CK back to normal  -PT OT recs SNF   : Repeat CK level is normal.  Replace potassium today.  : Labs improved  Vitamin B12 deficiency  -Start vitamin B12 supplementation  -folic acid level wnl    Vitamin D deficiency  ? Paget disease   -Will add vitamin D supplementation  -needs to replete Vit D

## 2024-08-25 NOTE — PLAN OF CARE
Problem: Safety - Adult  Goal: Free from fall injury  8/25/2024 0520 by Gabby Quarles RN  Outcome: Progressing  8/24/2024 1603 by Harriet Morocho RN  Outcome: Progressing     Problem: Discharge Planning  Goal: Discharge to home or other facility with appropriate resources  8/25/2024 0520 by Gabby Quarles RN  Outcome: Progressing  8/24/2024 1603 by Harriet Morocho RN  Outcome: Progressing     Problem: ABCDS Injury Assessment  Goal: Absence of physical injury  8/25/2024 0520 by Gabby Quarles RN  Outcome: Progressing  8/24/2024 1603 by Harriet Morocho RN  Outcome: Progressing     Problem: Skin/Tissue Integrity  Goal: Absence of new skin breakdown  Description: 1.  Monitor for areas of redness and/or skin breakdown  2.  Assess vascular access sites hourly  3.  Every 4-6 hours minimum:  Change oxygen saturation probe site  4.  Every 4-6 hours:  If on nasal continuous positive airway pressure, respiratory therapy assess nares and determine need for appliance change or resting period.  8/25/2024 0520 by Gabby Quarles RN  Outcome: Progressing  8/24/2024 1603 by Harriet Morocho RN  Outcome: Progressing     Problem: Pain  Goal: Verbalizes/displays adequate comfort level or baseline comfort level  8/25/2024 0520 by Gabby Quarles RN  Outcome: Progressing  8/24/2024 1603 by Harriet Morocho RN  Outcome: Progressing

## 2024-08-26 VITALS
SYSTOLIC BLOOD PRESSURE: 146 MMHG | DIASTOLIC BLOOD PRESSURE: 51 MMHG | OXYGEN SATURATION: 98 % | WEIGHT: 177.69 LBS | TEMPERATURE: 98.3 F | HEIGHT: 60 IN | HEART RATE: 92 BPM | RESPIRATION RATE: 16 BRPM | BODY MASS INDEX: 34.89 KG/M2

## 2024-08-26 PROCEDURE — 6370000000 HC RX 637 (ALT 250 FOR IP): Performed by: UROLOGY

## 2024-08-26 PROCEDURE — 6370000000 HC RX 637 (ALT 250 FOR IP): Performed by: INTERNAL MEDICINE

## 2024-08-26 PROCEDURE — 2580000003 HC RX 258: Performed by: UROLOGY

## 2024-08-26 RX ORDER — CHOLECALCIFEROL (VITAMIN D3) 25 MCG
5000 TABLET ORAL DAILY
Qty: 60 TABLET | Refills: 0 | Status: SHIPPED | OUTPATIENT
Start: 2024-08-27

## 2024-08-26 RX ORDER — OXYCODONE HYDROCHLORIDE 5 MG/1
2.5 TABLET ORAL EVERY 6 HOURS PRN
Qty: 6 TABLET | Refills: 0 | Status: SHIPPED | OUTPATIENT
Start: 2024-08-26 | End: 2024-08-29

## 2024-08-26 RX ORDER — POTASSIUM CHLORIDE 1.5 G/1.58G
40 POWDER, FOR SOLUTION ORAL ONCE
Status: DISCONTINUED | OUTPATIENT
Start: 2024-08-26 | End: 2024-08-26 | Stop reason: SDUPTHER

## 2024-08-26 RX ORDER — TRAZODONE HYDROCHLORIDE 50 MG/1
50 TABLET, FILM COATED ORAL NIGHTLY PRN
Qty: 7 TABLET | Refills: 0 | Status: SHIPPED | OUTPATIENT
Start: 2024-08-26 | End: 2024-08-26

## 2024-08-26 RX ORDER — POLYETHYLENE GLYCOL 3350 17 G/17G
17 POWDER, FOR SOLUTION ORAL DAILY PRN
Qty: 527 G | Refills: 1 | Status: SHIPPED | OUTPATIENT
Start: 2024-08-26 | End: 2024-10-27

## 2024-08-26 RX ORDER — TRAZODONE HYDROCHLORIDE 50 MG/1
50 TABLET, FILM COATED ORAL NIGHTLY PRN
Qty: 7 TABLET | Refills: 0 | Status: SHIPPED | OUTPATIENT
Start: 2024-08-26 | End: 2024-09-02

## 2024-08-26 RX ORDER — OXYCODONE HYDROCHLORIDE 5 MG/1
2.5 TABLET ORAL EVERY 6 HOURS PRN
Qty: 6 TABLET | Refills: 0 | Status: SHIPPED | OUTPATIENT
Start: 2024-08-26 | End: 2024-08-26

## 2024-08-26 RX ORDER — PHENAZOPYRIDINE HYDROCHLORIDE 200 MG/1
200 TABLET, FILM COATED ORAL 3 TIMES DAILY PRN
Qty: 9 TABLET | Refills: 0 | Status: SHIPPED | OUTPATIENT
Start: 2024-08-26 | End: 2024-08-29

## 2024-08-26 RX ORDER — AMLODIPINE BESYLATE 2.5 MG/1
2.5 TABLET ORAL DAILY
Qty: 30 TABLET | Refills: 3 | Status: SHIPPED | OUTPATIENT
Start: 2024-08-27

## 2024-08-26 RX ADMIN — LISINOPRIL 10 MG: 5 TABLET ORAL at 09:28

## 2024-08-26 RX ADMIN — Medication 5000 UNITS: at 09:28

## 2024-08-26 RX ADMIN — SODIUM CHLORIDE, PRESERVATIVE FREE 10 ML: 5 INJECTION INTRAVENOUS at 09:30

## 2024-08-26 RX ADMIN — POTASSIUM BICARBONATE 40 MEQ: 782 TABLET, EFFERVESCENT ORAL at 13:31

## 2024-08-26 RX ADMIN — CYANOCOBALAMIN TAB 500 MCG 1000 MCG: 500 TAB at 09:28

## 2024-08-26 RX ADMIN — AMLODIPINE BESYLATE 2.5 MG: 5 TABLET ORAL at 09:28

## 2024-08-26 ASSESSMENT — PAIN SCALES - GENERAL: PAINLEVEL_OUTOF10: 0

## 2024-08-26 NOTE — DISCHARGE SUMMARY
Hospitalist Discharge Summary     Patient ID:  Ronna Arzola  [id number]  89 y.o.  1935 8/16/2024    PCP on record: Carmen Aguero MD    Admit date: 8/16/2024  Discharge date and time: 8/26/2024    DISCHARGE DIAGNOSIS:    Invasive bladder mass extending consistent with bladder malignancy/sclerotic appearance of the sacrum and left iliac bone concerning for metastatic disease/previous disease/urinary tract infection/hematuria/generalized weakness/elevated creatine kinase/vitamin B deficiency/vitamin D deficiency/hypertension    CONSULTATIONS:  IP CONSULT TO UROLOGY  IP CONSULT TO PODIATRY  IP CONSULT TO PALLIATIVE CARE  IP CONSULT TO CASE MANAGEMENT    Excerpted HPI from H&P of Barbie Sr MD:  CHIEF COMPLAINT: Generalized weakness     HISTORY OF PRESENT ILLNESS:     Ronna Arzola is a 89 y.o.  female with PMHx significant for hypertension is coming the hospital chief complaints of generalized weakness.  Patient reports being in her usual health until about a week ago when she started having some generalized weakness along with difficulty ambulating.  Does not report any chest pain or shortness of breath.  Denies any abdominal pain, nausea vomiting.  No fever or chills.     On arrival to ED, she was noted to have stable vitals but was noted to have abnormal urinalysis and was given Rocephin and urine culture was sent and hospitalist was consulted for admission.  We were asked to admit for work up and evaluation of the above problems.      Past medical history  Hypertension     Past surgical history  None    ______________________________________________________________________  DISCHARGE SUMMARY/HOSPITAL COURSE:  for full details see H&P, daily progress notes, labs, consult notes.     Invasive bladder mass extending consistent with bladder malignancy, sclerotic appearance of the sacrum and left iliac bone concerning for either metastatic disease or Paget's disease.  UTI  Hematuria,

## 2024-08-26 NOTE — CARE COORDINATION
Cleared for D/C from CM standpoint  Transition of Care Plan:    RUR: 10%  Prior Level of Functioning: independent  Disposition: [facility]   If SNF or IPR: Date FOC offered: 8/23  Date FOC received: 8/23  Accepting facility: [facility]   Date authorization started with reference number: N/A  Date authorization received and expires: N/A  Follow up appointments: after rehab   DME needed: available at facility   Transportation at discharge: [facility], ETA 1430  IM/IMM Medicare/ letter given: given  Is patient a  and connected with VA? no   If yes, was  transfer form completed and VA notified?   Caregiver Contact: Parker (son)  Discharge Caregiver contacted prior to discharge? yes  Care Conference needed? no  Barriers to discharge:  none    Chart reviewed. CM aware of discharge order. Pt accepted to [facility]. Met with pt and family (Norma) at the bedside. Pt has chosen [facility]. RN to call report to [phone]. Pt assigned to room#203A. 2nd IM given and reviewed with patient. CM secured [facility] transport for d/c, ETA 2:30PM. Son was updated by phone during CM visit. No further CM needs identified.         08/26/24 1458   Services At/After Discharge   Transition of Care Consult (CM Consult) Discharge Planning   Services At/After Discharge Skilled Nursing Facility (SNF);Transport    Resource Information Provided? No   Mode of Transport at Discharge BLS  ([facility])   Hospital Transport Time of Discharge 1430   Confirm Follow Up Transport Family   Condition of Participation: Discharge Planning   The Plan for Transition of Care is related to the following treatment goals: rehab   The Patient and/or Patient Representative was provided with a Choice of Provider? Patient   The Patient and/Or Patient Representative agree with the Discharge Plan? Yes   Freedom of Choice list was provided with basic dialogue that

## 2024-08-26 NOTE — PROGRESS NOTES
Spiritual Health Assessment/Progress Note  [facility]    Initial Encounter, Palliative Care,  ,  ,      Name: Ronna Arzola MRN: [id number]    Age: 89 y.o.     Sex: female   Language: English   Yazdanism: Quaker   UTI (urinary tract infection)     Date: 8/26/2024            Total Time Calculated: 10 min              Spiritual Assessment began in [facility] 3 MED TELE        Referral/Consult From: Rounding   Encounter Overview/Reason: Initial Encounter, Palliative Care       Stacie, Belief, Meaning:   Patient identifies as spiritual, is connected with a stacie tradition or spiritual practice, and has beliefs or practices that help with coping during difficult times  Family/Friends No family/friends present      Importance and Influence:  Patient has spiritual/personal beliefs that influence decisions regarding their health  Family/Friends no family/friends present    Community:  Patient feels well-supported. Support system includes: Children  Family/Friends Other: No family present    Assessment and Plan of Care:     Patient Interventions include: Facilitated expression of thoughts and feelings and Explored spiritual coping/struggle/distress and theological reflection  Family/Friends Interventions include: Other: No family    Patient Plan of Care: Other: Pt discharged  Family/Friends Plan of Care: Other: No family    Electronically signed by Chaplain Adamaris on 8/26/2024 at 4:01 PM

## 2024-08-26 NOTE — PLAN OF CARE
Problem: Safety - Adult  Goal: Free from fall injury  Outcome: Adequate for Discharge  Flowsheets (Taken 8/26/2024 2962)  Free From Fall Injury: Instruct family/caregiver on patient safety     Problem: Discharge Planning  Goal: Discharge to home or other facility with appropriate resources  Outcome: Adequate for Discharge     Problem: ABCDS Injury Assessment  Goal: Absence of physical injury  Outcome: Adequate for Discharge     Problem: Skin/Tissue Integrity  Goal: Absence of new skin breakdown  Description: 1.  Monitor for areas of redness and/or skin breakdown  2.  Assess vascular access sites hourly  3.  Every 4-6 hours minimum:  Change oxygen saturation probe site  4.  Every 4-6 hours:  If on nasal continuous positive airway pressure, respiratory therapy assess nares and determine need for appliance change or resting period.  Outcome: Adequate for Discharge     Problem: Pain  Goal: Verbalizes/displays adequate comfort level or baseline comfort level  Outcome: Adequate for Discharge

## 2024-08-27 NOTE — PROGRESS NOTES
Ronna Arzola is a 89 y.o. female here for new patient appt for met bladder cancer.  Referred by [facility]  Pt here with 2 sons and daughter in law.   Presents in wheelchair. Unable to stand for weight check.  She is at  [facility].   Feels well but feels occasional pain with urination.     1. Have you been to the ER, urgent care clinic since your last visit?  Hospitalized since your last visit?  New Pt    2. Have you seen or consulted any other health care providers outside of the [facility] since your last visit?  Include any pap smears or colon screening.  New Pt

## 2024-08-29 ENCOUNTER — OFFICE VISIT (OUTPATIENT)
Age: 89
End: 2024-08-29
Payer: MEDICARE

## 2024-08-29 VITALS
HEIGHT: 60 IN | SYSTOLIC BLOOD PRESSURE: 147 MMHG | OXYGEN SATURATION: 99 % | HEART RATE: 64 BPM | BODY MASS INDEX: 34.7 KG/M2 | DIASTOLIC BLOOD PRESSURE: 74 MMHG | TEMPERATURE: 98.1 F

## 2024-08-29 DIAGNOSIS — C67.9 MALIGNANT NEOPLASM OF URINARY BLADDER, UNSPECIFIED SITE (HCC): ICD-10-CM

## 2024-08-29 DIAGNOSIS — C67.8 MALIGNANT NEOPLASM OF OVERLAPPING SITES OF BLADDER (HCC): Primary | ICD-10-CM

## 2024-08-29 DIAGNOSIS — R93.89 ABNORMAL CT SCAN: ICD-10-CM

## 2024-08-29 PROCEDURE — 1123F ACP DISCUSS/DSCN MKR DOCD: CPT | Performed by: INTERNAL MEDICINE

## 2024-08-29 PROCEDURE — G8417 CALC BMI ABV UP PARAM F/U: HCPCS | Performed by: INTERNAL MEDICINE

## 2024-08-29 PROCEDURE — 1036F TOBACCO NON-USER: CPT | Performed by: INTERNAL MEDICINE

## 2024-08-29 PROCEDURE — 99205 OFFICE O/P NEW HI 60 MIN: CPT | Performed by: INTERNAL MEDICINE

## 2024-08-29 PROCEDURE — 1090F PRES/ABSN URINE INCON ASSESS: CPT | Performed by: INTERNAL MEDICINE

## 2024-08-29 PROCEDURE — 1111F DSCHRG MED/CURRENT MED MERGE: CPT | Performed by: INTERNAL MEDICINE

## 2024-08-29 PROCEDURE — G8428 CUR MEDS NOT DOCUMENT: HCPCS | Performed by: INTERNAL MEDICINE

## 2024-08-29 RX ORDER — BISACODYL 10 MG
10 SUPPOSITORY, RECTAL RECTAL DAILY
COMMUNITY

## 2024-08-29 RX ORDER — ACETAMINOPHEN 500 MG
500 TABLET ORAL EVERY 6 HOURS PRN
COMMUNITY

## 2024-08-29 RX ORDER — SENNOSIDES A AND B 8.6 MG/1
1 TABLET, FILM COATED ORAL DAILY
COMMUNITY

## 2024-08-29 ASSESSMENT — PATIENT HEALTH QUESTIONNAIRE - PHQ9
2. FEELING DOWN, DEPRESSED OR HOPELESS: NOT AT ALL
SUM OF ALL RESPONSES TO PHQ QUESTIONS 1-9: 0
SUM OF ALL RESPONSES TO PHQ QUESTIONS 1-9: 0
SUM OF ALL RESPONSES TO PHQ9 QUESTIONS 1 & 2: 0
SUM OF ALL RESPONSES TO PHQ QUESTIONS 1-9: 0
1. LITTLE INTEREST OR PLEASURE IN DOING THINGS: NOT AT ALL
SUM OF ALL RESPONSES TO PHQ QUESTIONS 1-9: 0

## 2024-08-29 NOTE — PROGRESS NOTES
Per EDUARD from Dr. Tay PET/CT TUMOR IMAGE SKULL/THIGH  STAT.    I provided pt family member with order for PET scan and contact number for scheduling and instructions to call our office when scan is scheduled so we can then schedule office appt. She js hand this to [facility] for the facility to assist setting this up for pt and transportation then office appt follow up to be then scheduled.

## 2024-08-29 NOTE — PROGRESS NOTES
[facility]  [facility]  [address] [facility] [address], [address]  [place] [address]  [phone]       Oncology Consultation Note        Patient: Ronna Arzola MRN: [id number]  SSN: [id number]    YOB: 1935  Age: 89 y.o.  Sex: female        Subjective:      Ronna Arzola is a 89 y.o. female with a new diagnosis of bladder cancer. She was admitted to the hospital with generalized weakness for over 6 months. UA showed hematuria. She has been experiencing hematuria for over 3-4 months. A CT showed bladder tumor extending through the right lateral wall of the bladder. She underwent a cystoscopy and TURBT which revealed poorly differentiated urothelial carcinoma. She fell and is currently in SNF for rehabilitation. She denies abdominal pain. She is participating in PT and can walk some with rolator.     Review of Systems:    Constitutional: negative  Eyes: negative  Ears, Nose, Mouth, Throat, and Face: negative  Respiratory: negative  Cardiovascular: negative  Gastrointestinal: negative  Genitourinary: hematuria  Integument/Breast: negative  Hematologic/Lymphatic: negative  Musculoskeletal:negative  Neurological: negative    Past Medical History:   Diagnosis Date    Bladder tumor     Hypertension     Vitamin B deficiency     Vitamin D deficiency      Past Surgical History:   Procedure Laterality Date    ABDOMINAL EXPLORATION SURGERY      to remove a mass    BLADDER SURGERY N/A 8/22/2024    TRANSURETHRAL RESECTION OF BLADDER TUMOR performed by Trace Hernandez MD at [facility] MAIN OR    CATARACT EXTRACTION, BILATERAL      FOOT SURGERY Right     removed bone from right toe    HYSTERECTOMY (CERVIX STATUS UNKNOWN)      KELOID EXCISION Left     ear    KNEE ARTHROPLASTY Bilateral     TONSILLECTOMY        No family history on file.  Social History     Tobacco Use    Smoking status: Never    Smokeless tobacco: Never   Substance Use Topics    Alcohol use:  Not Currently      Prior to Admission medications    Medication Sig Start Date End Date Taking? Authorizing Provider   acetaminophen (TYLENOL) 500 MG tablet Take 1 tablet by mouth every 6 hours as needed for Pain   Yes Nely Najera MD   bisacodyl (DULCOLAX) 10 MG suppository Place 1 suppository rectally daily PRN   Yes Nely Najera MD   NONFORMULARY Enema            PRN   Yes Nely Najera MD   magnesium hydroxide (MILK OF MAGNESIA) 400 MG/5ML suspension Take by mouth daily as needed for Constipation   Yes Nely Najera MD   senna (SENOKOT) 8.6 MG tablet Take 1 tablet by mouth daily   Yes Nely Najera MD   amLODIPine (NORVASC) 2.5 MG tablet Take 1 tablet by mouth daily 8/27/24  Yes Traci Mcdonnell MD   phenazopyridine (PYRIDIUM) 200 MG tablet Take 1 tablet by mouth 3 times daily as needed for Pain 8/26/24 8/29/24 Yes Traci Mcdonnell MD   polyethylene glycol (GLYCOLAX) 17 g packet Take 1 packet by mouth daily as needed for Constipation 8/26/24 10/27/24 Yes Traci Mcdonnell MD   vitamin B-12 1000 MCG tablet Take 1 tablet by mouth daily 8/27/24  Yes Traci Mcdonnell MD   Cholecalciferol (VITAMIN D) 25 MCG TABS Take 5 tablets by mouth daily 8/27/24  Yes Traci Mcdonnell MD   oxyCODONE (ROXICODONE) 5 MG immediate release tablet Take 0.5 tablets by mouth every 6 hours as needed for Pain for up to 3 days. Max Daily Amount: 10 mg 8/26/24 8/29/24 Yes Traci Mcdonnell MD   lisinopril (PRINIVIL;ZESTRIL) 10 MG tablet Take 1 tablet by mouth daily   Yes Nely Najera MD   traZODone (DESYREL) 50 MG tablet Take 1 tablet by mouth nightly as needed for Sleep  Patient not taking: Reported on 8/29/2024 8/26/24 9/2/24  Traci Mcdonnell MD              Allergies   Allergen Reactions    Latex Rash           Objective:     Vitals:    08/29/24 1447   BP: (!) 147/74   Site: Right Upper Arm   Position: Sitting

## 2024-08-30 ENCOUNTER — CLINICAL DOCUMENTATION (OUTPATIENT)
Age: 89
End: 2024-08-30

## 2024-08-30 NOTE — PROGRESS NOTES
NCCN Distress Thermometer    Date Screening Completed: 08/29/24    Screening Declined:  [] Yes    Number that best describes how much distress you've experienced in the past week, including today?  0 [] - No distress 1 []      2 []      3 []      4 []       5 []       6 []      7 []      8 [x]      9 []       10 [] - Extreme distress    PROBLEM LIST  Have you had concerns about any of the items below in the past week, including today?      Physical Concerns Practical Concerns   [x] Pain [] Taking care of myself    [x] Sleep [x] Taking care of others    [x] Fatigue [] Work   [] Tobacco use  [] School   [] Substance use  [] Housing   [x] Memory or concentration [] Finances   [] Sexual health [] Insurance   [] Changes in eating  [] Transportation   [x] Loss or change of physical abilities  []     [] Having enough food   Emotional Concerns [] Access to medicine   [x] Worry or anxiety [] Treatment decisions   [] Sadness or depression    [] Loss of interest or enjoyment  Spiritual or Nondenominational Concerns   [] Grief or loss  [] Sense of meaning or purpose   [] Fear [] Changes in shan or beliefs   [] Loneliness  [] Death, dying, or afterlife   [] Anger [] Conflict between beliefs and cancer treatments    [] Changes in appearance [] Relationship with the sacred   [] Feelings of worthlessness or being a burden [] Ritual or dietary needs        Social Concerns     [] Relationship with spouse or partner     [] Relationship with children    [x] Relationship with family members     [] Relationship with friends or coworkers     [] Communication with health care team     [] Ability to have children     [] Prejudice or discrimination        Other Concerns:     Patient received resource information and education:  [] Yes  [x] No

## 2024-08-30 NOTE — PROGRESS NOTES
[facility] Oncology Social Work   Psychosocial Assessment    [x] Med-Onc [facility] [] Med-Onc [facility] [] Med-Onc [facility] [] Rad-Onc [facility] [] Rad-Onc [facility] [] Rad-Onc [facility] [] Rad-Onc [facility] [] Breast Center       Patient: Ronna Arzola    Reason for Assessment:    [] Social Work Referral  [x] Initial Assessment  [x] New Diagnosis  [] Other:     Advance Care Planning:  [] AMD Discussed and Completed  [] AMD Reviewed Verbally [] AMD Copy Provided  [x] Conversation Deferred [] Conversation Declined      Sources of Information:    [x] Patient  [x] Family  [x] Staff  [x] Medical Record    Mental Status:    [x] Alert  [] Lethargic  [] Unresponsive  [] Unable to assess   Oriented to: [x] Person  [x] Place  [x] Time  [x] Situation      Relationship Status:  [] Single  []   [] Significant Other/Life Partner  []   []   [x]     Living Circumstances:  [] Lives Alone  [x] Family/Significant Other in Household  [] Roommates  [] Children in the Home  [] Paid Caregivers  [] Assisted Living Facility/Group Home  [x] Skilled Nursing Facility  [] Homeless  [] Incarcerated  [] Environmental/Care Concerns  [] Other:    Employment Status:   [] Employed Full-time  [] Employed Part-time  [] Homemaker  [x] Retired  [] Short-Term Disability  [] Long-Term Disability  [] Unemployed  [] SSI  [] SSDI  [] Self-Employment    Transportation Resources:   [] Self  [x] Family/Friends  [] Insurance  [] Community Programs  [x] Other:    Barriers to Learning:    [] Language  [] Developmental  [] Cognitive  [] Altered Mental Status  [] Visual/Hearing Impairment  [] Unable to Read/Write  [] Motivational  [] Challenges Understanding Medical Jargon [x] No Barriers Identified      Financial/Legal Concerns:    [] Uninsured  [x] Limited Income/Resources  [] Non-Citizen  [] Food Insecurity [] No Concerns Identified   [] Other:    Anabaptist/Spiritual/Existential:   Does patient have any spiritual or Scientologist beliefs? [x] Yes [] No  Is the patient

## 2024-09-03 ENCOUNTER — TELEPHONE (OUTPATIENT)
Age: 89
End: 2024-09-03

## 2024-09-03 NOTE — TELEPHONE ENCOUNTER
[facility] Wang  Oncology Social Work Encounter    [x] Med-Onc [facility] [] Med-Onc [facility] [] Med-Onc [facility] [] Rad-Onc [facility] [] Rad-Onc [facility] [] Rad-Onc [facility] [] Rad-Onc [facility] [] Breast Center    Patient: Ronna Arzola    Encounter Type:    [] Initial SW Encounter  [] Patient Initiated  [] Referral  [] Distress/PHQ Screening  [] Other:      Concern(s)/Barrier(s) to Care:     Narrative: Pt son thinks pt will be released soon and don't think it will be safe if she needs treatment for pt to return home.  SW provided # to family to see if pt is eligible for Medicaid LTC.    Family doesn't want pt to know but they feel pt circumstance needs to be reported as not safe to live in but they don't want pt to know if they decide to rpt.  SW told them to contact APS for the counrty in which she lives.      The son that lives with pt is on methadone for years and they are concerned about his drinking also.   PT son and DIL feel like they are co dependents to the living conditions.       Referral/Handouts:   Insurance/Entitlements referral    Plan:

## 2024-09-03 NOTE — TELEPHONE ENCOUNTER
Pts son and daughter in law called about getting a sooner f/u appt after the pts PET Scan, advised we would need to keep the already scheduled appt in order to ensure we have the results prior to seeing the pt. They understood. They would like a call from Patty to discuss potential follow up care.

## 2024-09-15 PROBLEM — N39.0 UTI (URINARY TRACT INFECTION): Status: RESOLVED | Noted: 2024-08-16 | Resolved: 2024-09-15

## 2024-09-24 ENCOUNTER — HOSPITAL ENCOUNTER (OUTPATIENT)
Facility: HOSPITAL | Age: 89
Discharge: HOME | End: 2024-09-27
Attending: INTERNAL MEDICINE
Payer: MEDICARE

## 2024-09-24 VITALS — BODY MASS INDEX: 31.25 KG/M2 | WEIGHT: 160 LBS

## 2024-09-24 DIAGNOSIS — C67.9 MALIGNANT NEOPLASM OF URINARY BLADDER, UNSPECIFIED SITE (HCC): ICD-10-CM

## 2024-09-24 DIAGNOSIS — R93.89 ABNORMAL CT SCAN: ICD-10-CM

## 2024-09-24 DIAGNOSIS — C67.8 MALIGNANT NEOPLASM OF OVERLAPPING SITES OF BLADDER (HCC): ICD-10-CM

## 2024-09-24 LAB
GLUCOSE BLD STRIP.AUTO-MCNC: 108 MG/DL (ref 65–117)
SERVICE CMNT-IMP: NORMAL

## 2024-09-24 PROCEDURE — 78815 PET IMAGE W/CT SKULL-THIGH: CPT

## 2024-09-24 PROCEDURE — A9609 HC RX DIAGNOSTIC RADIOPHARMACEUTICAL: HCPCS | Performed by: INTERNAL MEDICINE

## 2024-09-24 PROCEDURE — 82962 GLUCOSE BLOOD TEST: CPT

## 2024-09-24 PROCEDURE — 3430000000 HC RX DIAGNOSTIC RADIOPHARMACEUTICAL: Performed by: INTERNAL MEDICINE

## 2024-09-24 RX ORDER — FLUDEOXYGLUCOSE F-18 500 MCI/ML
10 INJECTION INTRAVENOUS
Status: COMPLETED | OUTPATIENT
Start: 2024-09-24 | End: 2024-09-24

## 2024-09-24 RX ADMIN — FLUDEOXYGLUCOSE F-18 10 MILLICURIE: 500 INJECTION INTRAVENOUS at 10:00

## 2024-10-01 NOTE — PROGRESS NOTES
Ronna Arzola is a 89 y.o. female here for one month follow up appt for met bladder cancer.  PET done 9/24/24.   Pt is seeing more blood with urination and bowel movements.  Still dealing with pressure/pain when urinating.  Restless leg syndrome is back.   Had rectal pain but took Tylenol and Oxycodone and has been ok since.  Will see PCP Oct 7th.  [facility] [place] is handling her medications.     1. Have you been to the ER, urgent care clinic since your last visit?  Hospitalized since your last visit? no    2. Have you seen or consulted any other health care providers outside of the [facility] since your last visit?  Include any pap smears or colon screening.  No

## 2024-10-03 ENCOUNTER — CLINICAL DOCUMENTATION (OUTPATIENT)
Age: 89
End: 2024-10-03

## 2024-10-03 ENCOUNTER — OFFICE VISIT (OUTPATIENT)
Age: 89
End: 2024-10-03
Payer: MEDICARE

## 2024-10-03 VITALS
HEIGHT: 60 IN | HEART RATE: 94 BPM | BODY MASS INDEX: 31.96 KG/M2 | WEIGHT: 162.8 LBS | TEMPERATURE: 97.7 F | SYSTOLIC BLOOD PRESSURE: 135 MMHG | OXYGEN SATURATION: 99 % | DIASTOLIC BLOOD PRESSURE: 66 MMHG

## 2024-10-03 DIAGNOSIS — C67.8 MALIGNANT NEOPLASM OF OVERLAPPING SITES OF BLADDER (HCC): Primary | ICD-10-CM

## 2024-10-03 PROCEDURE — 99215 OFFICE O/P EST HI 40 MIN: CPT | Performed by: INTERNAL MEDICINE

## 2024-10-03 PROCEDURE — 1036F TOBACCO NON-USER: CPT | Performed by: INTERNAL MEDICINE

## 2024-10-03 PROCEDURE — G8428 CUR MEDS NOT DOCUMENT: HCPCS | Performed by: INTERNAL MEDICINE

## 2024-10-03 PROCEDURE — G8484 FLU IMMUNIZE NO ADMIN: HCPCS | Performed by: INTERNAL MEDICINE

## 2024-10-03 PROCEDURE — 1090F PRES/ABSN URINE INCON ASSESS: CPT | Performed by: INTERNAL MEDICINE

## 2024-10-03 PROCEDURE — G8417 CALC BMI ABV UP PARAM F/U: HCPCS | Performed by: INTERNAL MEDICINE

## 2024-10-03 PROCEDURE — 1123F ACP DISCUSS/DSCN MKR DOCD: CPT | Performed by: INTERNAL MEDICINE

## 2024-10-03 RX ORDER — OXYCODONE HYDROCHLORIDE 5 MG/1
TABLET ORAL
COMMUNITY
Start: 2024-09-17

## 2024-10-03 RX ORDER — ONDANSETRON 2 MG/ML
8 INJECTION INTRAMUSCULAR; INTRAVENOUS ONCE
OUTPATIENT
Start: 2024-10-14 | End: 2024-10-14

## 2024-10-03 NOTE — PROGRESS NOTES
Pharmacy Note- Oncology Treatment Education    Ronna Arzola is a  89 y.o.female  diagnosed with bladder cancer here today for chemotherapy treatment counseling. Ms. Arzola is being treated with weekly Gemcitabine with radiation.   Provided education on gemcitabine and premedications - ondansetron.    Side effects of oncology treatment reviewed included s/s infection, anemia, appetite changes, thrombocytopenia, fatigue, hair loss/alopecia,skin and nail changes, diarrhea/constipation, nausea/vomiting, and flu-like symptoms.    Orders to be given to [facility] facility where she resides to make aware of treatments, side effects and order medications    Patient given ways to manage these side effects and when to contact office.     [facility] [place] Handout of medications provided to patient. Ms. Arzola verbalized understanding of the information presented and all of the patient's questions were answered.    Katelyn Nolasco, PharmD, BCOP  For Pharmacy Admin Tracking Only    Program: Medical Group  CPA in place:  Yes  Recommendation Provided To: Patient/Caregiver: 3 via In person   Intervention Accepted By: Patient/Caregiver: 1   Time Spent (min): 20

## 2024-10-03 NOTE — PROGRESS NOTES
[facility]  at [facility]  [address], [facility], [address]  [place] [address]  [phone]       Oncology Note        Patient: Ronna Arzola MRN: [id number]  SSN: [id number]    YOB: 1935  Age: 89 y.o.  Sex: female        Subjective:      Ronna Arzola is a 89 y.o. female with a new diagnosis of bladder cancer. She was admitted to the hospital with generalized weakness for over 6 months. UA showed hematuria. She has been experiencing hematuria for over 3-4 months. A CT showed bladder tumor extending through the right lateral wall of the bladder. She underwent a cystoscopy and TURBT which revealed poorly differentiated urothelial carcinoma. She lives in an assisted living facility. She denies abdominal pain however she is experiencing significant hematuria. She is walking with with a rolator.     Review of Systems:    Constitutional: negative  Eyes: negative  Ears, Nose, Mouth, Throat, and Face: negative  Respiratory: negative  Cardiovascular: negative  Gastrointestinal: negative  Genitourinary: hematuria  Integument/Breast: negative  Hematologic/Lymphatic: negative  Musculoskeletal:negative  Neurological: negative    Past Medical History:   Diagnosis Date    Bladder tumor     Hypertension     Vitamin B deficiency     Vitamin D deficiency      Past Surgical History:   Procedure Laterality Date    ABDOMINAL EXPLORATION SURGERY      to remove a mass    BLADDER SURGERY N/A 8/22/2024    TRANSURETHRAL RESECTION OF BLADDER TUMOR performed by Trace Hernandez MD at [facility] MAIN OR    CATARACT EXTRACTION, BILATERAL      FOOT SURGERY Right     removed bone from right toe    HYSTERECTOMY (CERVIX STATUS UNKNOWN)      KELOID EXCISION Left     ear    KNEE ARTHROPLASTY Bilateral     TONSILLECTOMY        No family history on file.  Social History     Tobacco Use    Smoking status: Never    Smokeless tobacco: Never   Substance Use Topics    Alcohol

## 2024-10-07 ENCOUNTER — TELEPHONE (OUTPATIENT)
Age: 89
End: 2024-10-07

## 2024-10-07 NOTE — TELEPHONE ENCOUNTER
Multiple calls placed to [facility] to discuss coordination of care with our office. Medications need to be ordered from this facility to have available for her for side effect management. Left name and number for Iman or Leoncio (sp) to call back.    Katelyn MartinezD BCOP  For Pharmacy Admin Tracking Only    Program: Medical Group  CPA in place:  Yes  Intervention Accepted By: Other: 1   Time Spent (min): 10

## 2024-10-08 ENCOUNTER — TELEPHONE (OUTPATIENT)
Age: 89
End: 2024-10-08

## 2024-10-08 NOTE — TELEPHONE ENCOUNTER
Attempt made to reach administrators at [facility]. Have not been able to reach anyone but . Sent emails and faxed paper orders for medications with directions to facility    Katelyn Nolasco PharmD, [place]  For Pharmacy Admin Tracking Only    Program: Medical Group  CPA in place:  Yes   Time Spent (min): 10

## 2024-10-11 ENCOUNTER — APPOINTMENT (OUTPATIENT)
Facility: HOSPITAL | Age: 89
DRG: 686 | End: 2024-10-11
Payer: MEDICARE

## 2024-10-11 ENCOUNTER — HOSPITAL ENCOUNTER (INPATIENT)
Facility: HOSPITAL | Age: 89
LOS: 11 days | Discharge: NURSING, REHAB OR LONG-TERM CARE | DRG: 686 | End: 2024-10-23
Attending: EMERGENCY MEDICINE | Admitting: STUDENT IN AN ORGANIZED HEALTH CARE EDUCATION/TRAINING PROGRAM
Payer: MEDICARE

## 2024-10-11 DIAGNOSIS — S30.0XXA CONTUSION OF SACRUM, INITIAL ENCOUNTER: ICD-10-CM

## 2024-10-11 DIAGNOSIS — D64.9 ANEMIA, UNSPECIFIED TYPE: ICD-10-CM

## 2024-10-11 DIAGNOSIS — S00.03XA CONTUSION OF SCALP, INITIAL ENCOUNTER: ICD-10-CM

## 2024-10-11 DIAGNOSIS — R31.9 URINARY TRACT INFECTION WITH HEMATURIA, SITE UNSPECIFIED: ICD-10-CM

## 2024-10-11 DIAGNOSIS — C67.9 MALIGNANT NEOPLASM OF URINARY BLADDER, UNSPECIFIED SITE (HCC): Primary | ICD-10-CM

## 2024-10-11 DIAGNOSIS — N39.0 URINARY TRACT INFECTION WITH HEMATURIA, SITE UNSPECIFIED: ICD-10-CM

## 2024-10-11 LAB
ALBUMIN SERPL-MCNC: 2.5 G/DL (ref 3.5–5)
ALBUMIN/GLOB SERPL: 0.8 (ref 1.1–2.2)
ALP SERPL-CCNC: 102 U/L (ref 45–117)
ALT SERPL-CCNC: 41 U/L (ref 12–78)
ANION GAP SERPL CALC-SCNC: 4 MMOL/L (ref 2–12)
APPEARANCE UR: ABNORMAL
AST SERPL-CCNC: 63 U/L (ref 15–37)
BACTERIA URNS QL MICRO: ABNORMAL /HPF
BILIRUB SERPL-MCNC: 0.9 MG/DL (ref 0.2–1)
BILIRUB UR QL CFM: NEGATIVE
BUN SERPL-MCNC: 18 MG/DL (ref 6–20)
BUN/CREAT SERPL: 21 (ref 12–20)
CALCIUM SERPL-MCNC: 8.6 MG/DL (ref 8.5–10.1)
CHLORIDE SERPL-SCNC: 108 MMOL/L (ref 97–108)
CO2 SERPL-SCNC: 28 MMOL/L (ref 21–32)
COLOR UR: ABNORMAL
COMMENT:: NORMAL
CREAT SERPL-MCNC: 0.86 MG/DL (ref 0.55–1.02)
EPITH CASTS URNS QL MICRO: ABNORMAL /LPF
FERRITIN SERPL-MCNC: 21 NG/ML (ref 8–252)
GLOBULIN SER CALC-MCNC: 3.1 G/DL (ref 2–4)
GLUCOSE SERPL-MCNC: 99 MG/DL (ref 65–100)
GLUCOSE UR STRIP.AUTO-MCNC: NEGATIVE MG/DL
HAPTOGLOB SERPL-MCNC: 193 MG/DL (ref 30–200)
HEMOCCULT STL QL: NEGATIVE
HGB UR QL STRIP: ABNORMAL
HISTORY CHECK: NORMAL
HISTORY CHECK: NORMAL
HYALINE CASTS URNS QL MICRO: ABNORMAL /LPF (ref 0–5)
IRON SATN MFR SERPL: 9 % (ref 20–50)
IRON SERPL-MCNC: 22 UG/DL (ref 35–150)
KETONES UR QL STRIP.AUTO: NEGATIVE MG/DL
LACTATE SERPL-SCNC: 2 MMOL/L (ref 0.4–2)
LDH SERPL L TO P-CCNC: 293 U/L (ref 81–246)
LEUKOCYTE ESTERASE UR QL STRIP.AUTO: ABNORMAL
NITRITE UR QL STRIP.AUTO: POSITIVE
PH UR STRIP: 5 (ref 5–8)
POTASSIUM SERPL-SCNC: 2.8 MMOL/L (ref 3.5–5.1)
PROT SERPL-MCNC: 5.6 G/DL (ref 6.4–8.2)
PROT UR STRIP-MCNC: 30 MG/DL
RBC #/AREA URNS HPF: ABNORMAL /HPF (ref 0–5)
SODIUM SERPL-SCNC: 140 MMOL/L (ref 136–145)
SP GR UR REFRACTOMETRY: 1.01 (ref 1–1.03)
SPECIMEN HOLD: NORMAL
TIBC SERPL-MCNC: 235 UG/DL (ref 250–450)
URINE CULTURE IF INDICATED: ABNORMAL
UROBILINOGEN UR QL STRIP.AUTO: 1 EU/DL (ref 0.2–1)
WBC URNS QL MICRO: >100 /HPF (ref 0–4)

## 2024-10-11 PROCEDURE — 86901 BLOOD TYPING SEROLOGIC RH(D): CPT

## 2024-10-11 PROCEDURE — 72220 X-RAY EXAM SACRUM TAILBONE: CPT

## 2024-10-11 PROCEDURE — 6370000000 HC RX 637 (ALT 250 FOR IP): Performed by: STUDENT IN AN ORGANIZED HEALTH CARE EDUCATION/TRAINING PROGRAM

## 2024-10-11 PROCEDURE — 83550 IRON BINDING TEST: CPT

## 2024-10-11 PROCEDURE — P9016 RBC LEUKOCYTES REDUCED: HCPCS

## 2024-10-11 PROCEDURE — 36415 COLL VENOUS BLD VENIPUNCTURE: CPT

## 2024-10-11 PROCEDURE — 83010 ASSAY OF HAPTOGLOBIN QUANT: CPT

## 2024-10-11 PROCEDURE — 86900 BLOOD TYPING SEROLOGIC ABO: CPT

## 2024-10-11 PROCEDURE — 87086 URINE CULTURE/COLONY COUNT: CPT

## 2024-10-11 PROCEDURE — 87040 BLOOD CULTURE FOR BACTERIA: CPT

## 2024-10-11 PROCEDURE — 83540 ASSAY OF IRON: CPT

## 2024-10-11 PROCEDURE — 83605 ASSAY OF LACTIC ACID: CPT

## 2024-10-11 PROCEDURE — 83615 LACTATE (LD) (LDH) ENZYME: CPT

## 2024-10-11 PROCEDURE — 99285 EMERGENCY DEPT VISIT HI MDM: CPT

## 2024-10-11 PROCEDURE — 86850 RBC ANTIBODY SCREEN: CPT

## 2024-10-11 PROCEDURE — 81001 URINALYSIS AUTO W/SCOPE: CPT

## 2024-10-11 PROCEDURE — 82272 OCCULT BLD FECES 1-3 TESTS: CPT

## 2024-10-11 PROCEDURE — 85025 COMPLETE CBC W/AUTO DIFF WBC: CPT

## 2024-10-11 PROCEDURE — G0378 HOSPITAL OBSERVATION PER HR: HCPCS

## 2024-10-11 PROCEDURE — 86923 COMPATIBILITY TEST ELECTRIC: CPT

## 2024-10-11 PROCEDURE — 80053 COMPREHEN METABOLIC PANEL: CPT

## 2024-10-11 PROCEDURE — 82728 ASSAY OF FERRITIN: CPT

## 2024-10-11 RX ORDER — OXYCODONE HYDROCHLORIDE 5 MG/1
2.5 TABLET ORAL EVERY 6 HOURS PRN
Status: DISCONTINUED | OUTPATIENT
Start: 2024-10-11 | End: 2024-10-11

## 2024-10-11 RX ORDER — PRAZOSIN HYDROCHLORIDE 1 MG/1
1 CAPSULE ORAL NIGHTLY
COMMUNITY

## 2024-10-11 RX ORDER — MIRTAZAPINE 15 MG/1
7.5 TABLET, FILM COATED ORAL NIGHTLY
Status: DISCONTINUED | OUTPATIENT
Start: 2024-10-11 | End: 2024-10-23 | Stop reason: HOSPADM

## 2024-10-11 RX ORDER — GABAPENTIN 100 MG/1
100 CAPSULE ORAL 3 TIMES DAILY
Status: ON HOLD | COMMUNITY
Start: 2024-10-07 | End: 2024-10-23

## 2024-10-11 RX ORDER — ACETAMINOPHEN 650 MG/1
650 SUPPOSITORY RECTAL EVERY 6 HOURS PRN
Status: DISCONTINUED | OUTPATIENT
Start: 2024-10-11 | End: 2024-10-23 | Stop reason: HOSPADM

## 2024-10-11 RX ORDER — ASPIRIN 81 MG/1
81 TABLET ORAL DAILY
Status: ON HOLD | COMMUNITY
End: 2024-10-15 | Stop reason: HOSPADM

## 2024-10-11 RX ORDER — OXYCODONE HYDROCHLORIDE 5 MG/1
5 TABLET ORAL EVERY 6 HOURS PRN
Status: DISCONTINUED | OUTPATIENT
Start: 2024-10-11 | End: 2024-10-23 | Stop reason: HOSPADM

## 2024-10-11 RX ORDER — PRAZOSIN HYDROCHLORIDE 1 MG/1
1 CAPSULE ORAL NIGHTLY
Status: DISCONTINUED | OUTPATIENT
Start: 2024-10-11 | End: 2024-10-23 | Stop reason: HOSPADM

## 2024-10-11 RX ORDER — ACETAMINOPHEN 325 MG/1
650 TABLET ORAL EVERY 6 HOURS PRN
Status: DISCONTINUED | OUTPATIENT
Start: 2024-10-11 | End: 2024-10-23 | Stop reason: HOSPADM

## 2024-10-11 RX ORDER — ATORVASTATIN CALCIUM 10 MG/1
10 TABLET, FILM COATED ORAL DAILY
COMMUNITY

## 2024-10-11 RX ORDER — SODIUM CHLORIDE 0.9 % (FLUSH) 0.9 %
5-40 SYRINGE (ML) INJECTION EVERY 12 HOURS SCHEDULED
Status: DISCONTINUED | OUTPATIENT
Start: 2024-10-11 | End: 2024-10-23 | Stop reason: HOSPADM

## 2024-10-11 RX ORDER — ESCITALOPRAM OXALATE 5 MG/1
1 TABLET ORAL EVERY MORNING
COMMUNITY

## 2024-10-11 RX ORDER — SODIUM CHLORIDE 0.9 % (FLUSH) 0.9 %
5-40 SYRINGE (ML) INJECTION PRN
Status: DISCONTINUED | OUTPATIENT
Start: 2024-10-11 | End: 2024-10-23 | Stop reason: HOSPADM

## 2024-10-11 RX ORDER — SODIUM CHLORIDE 9 MG/ML
INJECTION, SOLUTION INTRAVENOUS PRN
Status: DISCONTINUED | OUTPATIENT
Start: 2024-10-11 | End: 2024-10-12

## 2024-10-11 RX ORDER — POLYETHYLENE GLYCOL 3350 17 G/17G
17 POWDER, FOR SOLUTION ORAL DAILY PRN
Status: DISCONTINUED | OUTPATIENT
Start: 2024-10-11 | End: 2024-10-23 | Stop reason: HOSPADM

## 2024-10-11 RX ORDER — GABAPENTIN 100 MG/1
100 CAPSULE ORAL 3 TIMES DAILY
Status: DISCONTINUED | OUTPATIENT
Start: 2024-10-11 | End: 2024-10-17

## 2024-10-11 RX ORDER — ONDANSETRON 2 MG/ML
4 INJECTION INTRAMUSCULAR; INTRAVENOUS EVERY 6 HOURS PRN
Status: DISCONTINUED | OUTPATIENT
Start: 2024-10-11 | End: 2024-10-23 | Stop reason: HOSPADM

## 2024-10-11 RX ORDER — ONDANSETRON 4 MG/1
4 TABLET, ORALLY DISINTEGRATING ORAL EVERY 8 HOURS PRN
Status: DISCONTINUED | OUTPATIENT
Start: 2024-10-11 | End: 2024-10-23 | Stop reason: HOSPADM

## 2024-10-11 RX ORDER — MIRTAZAPINE 7.5 MG/1
7.5 TABLET, FILM COATED ORAL NIGHTLY
COMMUNITY
Start: 2024-10-10

## 2024-10-11 RX ORDER — POTASSIUM CHLORIDE 750 MG/1
40 TABLET, EXTENDED RELEASE ORAL PRN
Status: DISCONTINUED | OUTPATIENT
Start: 2024-10-11 | End: 2024-10-23 | Stop reason: HOSPADM

## 2024-10-11 RX ORDER — ATORVASTATIN CALCIUM 10 MG/1
10 TABLET, FILM COATED ORAL DAILY
Status: DISCONTINUED | OUTPATIENT
Start: 2024-10-12 | End: 2024-10-23 | Stop reason: HOSPADM

## 2024-10-11 RX ORDER — MAGNESIUM SULFATE IN WATER 40 MG/ML
2000 INJECTION, SOLUTION INTRAVENOUS PRN
Status: DISCONTINUED | OUTPATIENT
Start: 2024-10-11 | End: 2024-10-23 | Stop reason: HOSPADM

## 2024-10-11 RX ORDER — ESCITALOPRAM OXALATE 10 MG/1
5 TABLET ORAL EVERY MORNING
Status: DISCONTINUED | OUTPATIENT
Start: 2024-10-12 | End: 2024-10-23 | Stop reason: HOSPADM

## 2024-10-11 RX ORDER — POTASSIUM CHLORIDE 7.45 MG/ML
10 INJECTION INTRAVENOUS PRN
Status: DISCONTINUED | OUTPATIENT
Start: 2024-10-11 | End: 2024-10-23 | Stop reason: HOSPADM

## 2024-10-11 RX ORDER — ASPIRIN 81 MG/1
81 TABLET ORAL DAILY
Status: DISCONTINUED | OUTPATIENT
Start: 2024-10-12 | End: 2024-10-23 | Stop reason: HOSPADM

## 2024-10-11 RX ORDER — SODIUM CHLORIDE 9 MG/ML
INJECTION, SOLUTION INTRAVENOUS PRN
Status: DISCONTINUED | OUTPATIENT
Start: 2024-10-11 | End: 2024-10-23 | Stop reason: HOSPADM

## 2024-10-11 RX ADMIN — GABAPENTIN 100 MG: 100 CAPSULE ORAL at 23:28

## 2024-10-11 RX ADMIN — OXYCODONE 5 MG: 5 TABLET ORAL at 23:23

## 2024-10-11 RX ADMIN — MIRTAZAPINE 7.5 MG: 15 TABLET, FILM COATED ORAL at 23:28

## 2024-10-11 ASSESSMENT — PAIN - FUNCTIONAL ASSESSMENT: PAIN_FUNCTIONAL_ASSESSMENT: PREVENTS OR INTERFERES SOME ACTIVE ACTIVITIES AND ADLS

## 2024-10-11 ASSESSMENT — PAIN DESCRIPTION - ORIENTATION
ORIENTATION: RIGHT;LEFT
ORIENTATION: OTHER (COMMENT)

## 2024-10-11 ASSESSMENT — PAIN DESCRIPTION - DESCRIPTORS: DESCRIPTORS: ACHING

## 2024-10-11 ASSESSMENT — PAIN SCALES - GENERAL
PAINLEVEL_OUTOF10: 5
PAINLEVEL_OUTOF10: 5

## 2024-10-11 ASSESSMENT — PAIN DESCRIPTION - LOCATION
LOCATION: LEG
LOCATION: BUTTOCKS

## 2024-10-11 ASSESSMENT — PAIN DESCRIPTION - PAIN TYPE: TYPE: ACUTE PAIN

## 2024-10-11 ASSESSMENT — PAIN DESCRIPTION - ONSET: ONSET: ON-GOING

## 2024-10-11 ASSESSMENT — PAIN DESCRIPTION - FREQUENCY: FREQUENCY: CONTINUOUS

## 2024-10-11 NOTE — CONSENT
Informed Consent for Blood Component Transfusion Note    I have discussed with the patient the rationale for blood component transfusion; its benefits in treating or preventing fatigue, organ damage, or death; and its risk which includes mild transfusion reactions, rare risk of blood borne infection, or more serious but rare reactions. I have discussed the alternatives to transfusion, including the risk and consequences of not receiving transfusion. The patient had an opportunity to ask questions and had agreed to proceed with transfusion of blood components.    Electronically signed by Alvarado Clark MD on 10/11/24 at 6:32 PM EDT

## 2024-10-11 NOTE — ED TRIAGE NOTES
Pt arrives with EMS from [facility]. Per EMS, pt was walking assisted by staff when pt started falling and was lowered down. Staff felt pt was altered. Pt responding to orientation questions but refusing to open eyes.     Pt c/o pain to bottom    Pt has bladder cancer

## 2024-10-11 NOTE — ED PROVIDER NOTES
[facility] EMERGENCY DEP  EMERGENCY DEPARTMENT ENCOUNTER      Pt Name: Ronna Arzola  MRN: [id number]  Birthdate 1935  Date of evaluation: 10/11/2024  Provider: Alvarado Clark MD    CHIEF COMPLAINT       Chief Complaint   Patient presents with    Buttocks Pain         HISTORY OF PRESENT ILLNESS    This is a 89-year-old female with a history of malignant neoplasm of the bladder.  She is followed by Dr. Tay with oncology for the same.  She also states that she has had bleeding from her bowel previously and in the last day or so has noted bright red blood per rectum.  She presents today after several falls.  Last fall she sustained a contusion of the buttocks.  She is complaining of some pain over her tailbone.  The fall a day or 2 prior she did hit the back of her head but there was no loss of consciousness.  She denies any fever or chills, nausea or vomiting and no chest pain or abdominal pain no shortness of breath is noted.  Patient denies any abdominal pain.  She has been having increasing difficulty with stability on her feet             Review of External Medical Records:     Nursing Notes were reviewed.    REVIEW OF SYSTEMS       Review of Systems   Constitutional:  Negative for activity change, chills, fatigue and fever.   HENT:  Negative for congestion, ear pain, rhinorrhea, sinus pressure, sinus pain, sore throat and trouble swallowing.         Head contusion from fall   Eyes: Negative.    Respiratory:  Negative for cough, chest tightness, shortness of breath, wheezing and stridor.    Cardiovascular:  Negative for chest pain, palpitations and leg swelling.   Gastrointestinal:  Positive for blood in stool. Negative for abdominal pain, diarrhea, nausea and vomiting.   Endocrine: Negative.    Genitourinary:  Negative for decreased urine volume, difficulty urinating, flank pain, frequency and hematuria.   Musculoskeletal:  Positive for back pain. Negative for joint swelling and neck pain.        Low  Abnormal; Notable for the following components:    Appearance CLOUDY (*)     Protein, UA 30 (*)     Blood, Urine MODERATE (*)     Nitrite, Urine Positive (*)     Leukocyte Esterase, Urine LARGE (*)     All other components within normal limits   EXTRA TUBES HOLD   BILIRUBIN, CONFIRMATORY   TYPE AND SCREEN   TYPE AND SCREEN   PREPARE RBC (CROSSMATCH)       All other labs were within normal range or not returned as of this dictation.    EMERGENCY DEPARTMENT COURSE and DIFFERENTIAL DIAGNOSIS/MDM:   Vitals:    Vitals:    10/11/24 1630 10/11/24 1805   BP: (!) 99/54 95/70   Pulse: 95    Resp: 16    Temp: 97.7 °F (36.5 °C)    TempSrc: Temporal    SpO2: 97%    Weight: 73 kg (161 lb)    Height: 1.524 m (5')            Medical Decision Making  This is an 89-year-old female who presents with weakness and easy fatigability.  She has had several falls recently and on 1 fall injured the back of her head.  There was no loss of consciousness.  The second fall earlier today she landed on her lower back and is complaining of pain in the back.  She also noted that she had passed some blood initially she said was from her rectum.  She then noted later that she had blood in her urine.  On physical exam, the patient is alert and oriented x 3.  She has some localized pain over the sacrum and the coccyx.  There is no hematoma or swelling of the scalp on the head.  Neurologically the patient is intact.  Abdomen is benign and on rectal exam she has no blood in the vault.  Stool was sent for Hemoccult.  The urine demonstrates large amount of blood.  Will check her basic blood counts as well as x-rays and reevaluate.    Amount and/or Complexity of Data Reviewed  Labs: ordered. Decision-making details documented in ED Course.  Radiology: ordered and independent interpretation performed. Decision-making details documented in ED Course.    Risk  Prescription drug management.  Decision regarding hospitalization.            REASSESSMENT

## 2024-10-12 ENCOUNTER — APPOINTMENT (OUTPATIENT)
Facility: HOSPITAL | Age: 89
DRG: 686 | End: 2024-10-12
Payer: MEDICARE

## 2024-10-12 PROBLEM — D62 ACUTE BLOOD LOSS ANEMIA: Status: ACTIVE | Noted: 2024-10-12

## 2024-10-12 LAB
ANION GAP SERPL CALC-SCNC: 1 MMOL/L (ref 2–12)
ANION GAP SERPL CALC-SCNC: 4 MMOL/L (ref 2–12)
BACTERIA SPEC CULT: NORMAL
BASOPHILS # BLD: 0 K/UL (ref 0–0.1)
BASOPHILS # BLD: 0.1 K/UL (ref 0–0.1)
BASOPHILS NFR BLD: 0 % (ref 0–1)
BASOPHILS NFR BLD: 1 % (ref 0–1)
BUN SERPL-MCNC: 8 MG/DL (ref 6–20)
BUN SERPL-MCNC: 9 MG/DL (ref 6–20)
BUN/CREAT SERPL: 12 (ref 12–20)
BUN/CREAT SERPL: 13 (ref 12–20)
CALCIUM SERPL-MCNC: 8 MG/DL (ref 8.5–10.1)
CALCIUM SERPL-MCNC: 8.2 MG/DL (ref 8.5–10.1)
CC UR VC: NORMAL
CHLORIDE SERPL-SCNC: 112 MMOL/L (ref 97–108)
CHLORIDE SERPL-SCNC: 113 MMOL/L (ref 97–108)
CO2 SERPL-SCNC: 28 MMOL/L (ref 21–32)
CO2 SERPL-SCNC: 31 MMOL/L (ref 21–32)
CREAT SERPL-MCNC: 0.65 MG/DL (ref 0.55–1.02)
CREAT SERPL-MCNC: 0.67 MG/DL (ref 0.55–1.02)
DIFFERENTIAL METHOD BLD: ABNORMAL
DIFFERENTIAL METHOD BLD: ABNORMAL
EOSINOPHIL # BLD: 0.3 K/UL (ref 0–0.4)
EOSINOPHIL # BLD: 0.6 K/UL (ref 0–0.4)
EOSINOPHIL NFR BLD: 3 % (ref 0–7)
EOSINOPHIL NFR BLD: 7 % (ref 0–7)
ERYTHROCYTE [DISTWIDTH] IN BLOOD BY AUTOMATED COUNT: 15.9 % (ref 11.5–14.5)
ERYTHROCYTE [DISTWIDTH] IN BLOOD BY AUTOMATED COUNT: 16.2 % (ref 11.5–14.5)
GLUCOSE SERPL-MCNC: 70 MG/DL (ref 65–100)
GLUCOSE SERPL-MCNC: 80 MG/DL (ref 65–100)
HCT VFR BLD AUTO: 19.5 % (ref 35–47)
HCT VFR BLD AUTO: 24.5 % (ref 35–47)
HCT VFR BLD AUTO: 28.4 % (ref 35–47)
HGB BLD-MCNC: 5.8 G/DL (ref 11.5–16)
HGB BLD-MCNC: 7.6 G/DL (ref 11.5–16)
HGB BLD-MCNC: 8.9 G/DL (ref 11.5–16)
IMM GRANULOCYTES # BLD AUTO: 0 K/UL (ref 0–0.04)
IMM GRANULOCYTES # BLD AUTO: 0.1 K/UL (ref 0–0.04)
IMM GRANULOCYTES NFR BLD AUTO: 0 % (ref 0–0.5)
IMM GRANULOCYTES NFR BLD AUTO: 1 % (ref 0–0.5)
LYMPHOCYTES # BLD: 1.3 K/UL (ref 0.8–3.5)
LYMPHOCYTES # BLD: 1.9 K/UL (ref 0.8–3.5)
LYMPHOCYTES NFR BLD: 13 % (ref 12–49)
LYMPHOCYTES NFR BLD: 21 % (ref 12–49)
MCH RBC QN AUTO: 23.8 PG (ref 26–34)
MCH RBC QN AUTO: 25.1 PG (ref 26–34)
MCHC RBC AUTO-ENTMCNC: 29.7 G/DL (ref 30–36.5)
MCHC RBC AUTO-ENTMCNC: 31 G/DL (ref 30–36.5)
MCV RBC AUTO: 79.9 FL (ref 80–99)
MCV RBC AUTO: 80.9 FL (ref 80–99)
MONOCYTES # BLD: 0.7 K/UL (ref 0–1)
MONOCYTES # BLD: 0.8 K/UL (ref 0–1)
MONOCYTES NFR BLD: 8 % (ref 5–13)
MONOCYTES NFR BLD: 8 % (ref 5–13)
NEUTS SEG # BLD: 5.8 K/UL (ref 1.8–8)
NEUTS SEG # BLD: 7.4 K/UL (ref 1.8–8)
NEUTS SEG NFR BLD: 63 % (ref 32–75)
NEUTS SEG NFR BLD: 75 % (ref 32–75)
NRBC # BLD: 0 K/UL (ref 0–0.01)
NRBC # BLD: 0.02 K/UL (ref 0–0.01)
NRBC BLD-RTO: 0 PER 100 WBC
NRBC BLD-RTO: 0.2 PER 100 WBC
PATH REV BLD -IMP: ABNORMAL
PLATELET # BLD AUTO: 299 K/UL (ref 150–400)
PLATELET # BLD AUTO: 380 K/UL (ref 150–400)
PMV BLD AUTO: 9 FL (ref 8.9–12.9)
PMV BLD AUTO: 9.1 FL (ref 8.9–12.9)
POTASSIUM SERPL-SCNC: 2.7 MMOL/L (ref 3.5–5.1)
POTASSIUM SERPL-SCNC: 3.5 MMOL/L (ref 3.5–5.1)
RBC # BLD AUTO: 2.44 M/UL (ref 3.8–5.2)
RBC # BLD AUTO: 3.03 M/UL (ref 3.8–5.2)
RBC MORPH BLD: ABNORMAL
SERVICE CMNT-IMP: NORMAL
SODIUM SERPL-SCNC: 144 MMOL/L (ref 136–145)
SODIUM SERPL-SCNC: 145 MMOL/L (ref 136–145)
WBC # BLD AUTO: 9.1 K/UL (ref 3.6–11)
WBC # BLD AUTO: 9.9 K/UL (ref 3.6–11)
WBC MORPH BLD: ABNORMAL

## 2024-10-12 PROCEDURE — 6370000000 HC RX 637 (ALT 250 FOR IP): Performed by: STUDENT IN AN ORGANIZED HEALTH CARE EDUCATION/TRAINING PROGRAM

## 2024-10-12 PROCEDURE — P9016 RBC LEUKOCYTES REDUCED: HCPCS

## 2024-10-12 PROCEDURE — 87040 BLOOD CULTURE FOR BACTERIA: CPT

## 2024-10-12 PROCEDURE — G0378 HOSPITAL OBSERVATION PER HR: HCPCS

## 2024-10-12 PROCEDURE — 97530 THERAPEUTIC ACTIVITIES: CPT

## 2024-10-12 PROCEDURE — 96361 HYDRATE IV INFUSION ADD-ON: CPT

## 2024-10-12 PROCEDURE — 1100000000 HC RM PRIVATE

## 2024-10-12 PROCEDURE — 85025 COMPLETE CBC W/AUTO DIFF WBC: CPT

## 2024-10-12 PROCEDURE — 36430 TRANSFUSION BLD/BLD COMPNT: CPT

## 2024-10-12 PROCEDURE — 99221 1ST HOSP IP/OBS SF/LOW 40: CPT | Performed by: INTERNAL MEDICINE

## 2024-10-12 PROCEDURE — 2580000003 HC RX 258: Performed by: STUDENT IN AN ORGANIZED HEALTH CARE EDUCATION/TRAINING PROGRAM

## 2024-10-12 PROCEDURE — 80048 BASIC METABOLIC PNL TOTAL CA: CPT

## 2024-10-12 PROCEDURE — 96374 THER/PROPH/DIAG INJ IV PUSH: CPT

## 2024-10-12 PROCEDURE — 70450 CT HEAD/BRAIN W/O DYE: CPT

## 2024-10-12 PROCEDURE — 85018 HEMOGLOBIN: CPT

## 2024-10-12 PROCEDURE — 85014 HEMATOCRIT: CPT

## 2024-10-12 PROCEDURE — 30233N1 TRANSFUSION OF NONAUTOLOGOUS RED BLOOD CELLS INTO PERIPHERAL VEIN, PERCUTANEOUS APPROACH: ICD-10-PCS | Performed by: FAMILY MEDICINE

## 2024-10-12 PROCEDURE — 6360000002 HC RX W HCPCS: Performed by: STUDENT IN AN ORGANIZED HEALTH CARE EDUCATION/TRAINING PROGRAM

## 2024-10-12 PROCEDURE — 36415 COLL VENOUS BLD VENIPUNCTURE: CPT

## 2024-10-12 PROCEDURE — 97161 PT EVAL LOW COMPLEX 20 MIN: CPT

## 2024-10-12 PROCEDURE — 97165 OT EVAL LOW COMPLEX 30 MIN: CPT

## 2024-10-12 RX ORDER — SODIUM CHLORIDE 9 MG/ML
INJECTION, SOLUTION INTRAVENOUS CONTINUOUS
Status: DISCONTINUED | OUTPATIENT
Start: 2024-10-12 | End: 2024-10-12

## 2024-10-12 RX ADMIN — SODIUM CHLORIDE, PRESERVATIVE FREE 10 ML: 5 INJECTION INTRAVENOUS at 08:11

## 2024-10-12 RX ADMIN — PRAZOSIN HYDROCHLORIDE 1 MG: 1 CAPSULE ORAL at 01:27

## 2024-10-12 RX ADMIN — SODIUM CHLORIDE, PRESERVATIVE FREE 10 ML: 5 INJECTION INTRAVENOUS at 20:39

## 2024-10-12 RX ADMIN — POTASSIUM BICARBONATE 20 MEQ: 782 TABLET, EFFERVESCENT ORAL at 17:23

## 2024-10-12 RX ADMIN — MIRTAZAPINE 7.5 MG: 15 TABLET, FILM COATED ORAL at 20:18

## 2024-10-12 RX ADMIN — ACETAMINOPHEN 650 MG: 325 TABLET ORAL at 01:27

## 2024-10-12 RX ADMIN — POTASSIUM CHLORIDE 10 MEQ: 7.46 INJECTION, SOLUTION INTRAVENOUS at 08:11

## 2024-10-12 RX ADMIN — POTASSIUM CHLORIDE 10 MEQ: 7.46 INJECTION, SOLUTION INTRAVENOUS at 09:48

## 2024-10-12 RX ADMIN — POTASSIUM BICARBONATE 40 MEQ: 782 TABLET, EFFERVESCENT ORAL at 10:24

## 2024-10-12 RX ADMIN — SODIUM CHLORIDE, PRESERVATIVE FREE 10 ML: 5 INJECTION INTRAVENOUS at 01:28

## 2024-10-12 RX ADMIN — PRAZOSIN HYDROCHLORIDE 1 MG: 1 CAPSULE ORAL at 20:18

## 2024-10-12 RX ADMIN — GABAPENTIN 100 MG: 100 CAPSULE ORAL at 10:24

## 2024-10-12 RX ADMIN — ATORVASTATIN CALCIUM 10 MG: 10 TABLET, FILM COATED ORAL at 10:23

## 2024-10-12 RX ADMIN — WATER 1000 MG: 1 INJECTION INTRAMUSCULAR; INTRAVENOUS; SUBCUTANEOUS at 10:24

## 2024-10-12 RX ADMIN — ESCITALOPRAM OXALATE 5 MG: 10 TABLET ORAL at 10:23

## 2024-10-12 ASSESSMENT — PAIN SCALES - GENERAL
PAINLEVEL_OUTOF10: 0
PAINLEVEL_OUTOF10: 0

## 2024-10-12 NOTE — PROGRESS NOTES
[facility]                                                                                          Hospitalist Progress Note  Soco Rivers MD  Office Phone: [phone]        Date of Service:  10/12/2024  NAME:  Ronna Arzola  :  1935  MRN:  [id number]       Admission Summary:   Ronna Arzola is a 89 y.o. female with history of urothelial carcinoma of bladder, hypertension, insomnia, chronic constipation who presented to the ER from nursing facility for evaluation of falls weakness, fatigue and malaise.  She reports near 1 week of severe weakness as well as symptoms into falls.  Sustained a head injury last week.  Today she lost her footing and fell backward and landed on her buttock.  Noted some pain in his sacrum after her fall.  No reported head injury with this fall.  When told about her anemia she does admit to intermittent episodes of occasional dark stools and occasionally bright red blood in her stools.  The patient denies any fever, chills, chest or abdominal pain, nausea, vomiting, cough, congestion, recent illness, palpitations, or dysuria.       Interval history / Subjective:   Patient evaluated this morning. She was confused, she reported she lives with her grandmother. She endorsed pain in her arm at IV site. ROS limited 2/2 patient ability to participate.     Patient's son, Parker Arzola, updated via phone call.      Assessment & Plan:        Acute blood loss anemia  Suspected GIB   - hemoglobin on admission, 5.8   - s/p 2U pRBC --> hgb 7.6   - FOBT negative   - GI consulted, appreciate assistance   - CLD for now, gentle IVFs   - Unclear if patient will be able to tolerate bowel prep   - Hold home asa   - Patient does have large bladder mass which may be source of bleeding, see below     Abnormal UA   Concern for acute cystitis   - UA concerning for infection   - IV rocephin x 5 days    Bladder Cancer  - [facility];  Activity: Not on file   Stress: Not on file   Social Connections: Not on file   Intimate Partner Violence: Not on file   Depression: Not at risk (8/29/2024)    PHQ-2     PHQ-2 Score: 0   Housing Stability: Low Risk  (8/16/2024)    Housing Stability Vital Sign     Unable to Pay for Housing in the Last Year: No     Number of Times Moved in the Last Year: 1     Homeless in the Last Year: No   Interpersonal Safety: Not At Risk (8/22/2024)    Interpersonal Safety Domain Source: IP Abuse Screening     Physical abuse: Denies     Verbal abuse: Denies     Emotional abuse: Denies     Financial abuse: Denies     Sexual abuse: Denies   Utilities: Not At Risk (8/16/2024)    [facility] Utilities     Threatened with loss of utilities: No       Review of Systems:   Pertinent items are noted in HPI.       Vital Signs:    Last 24hrs VS reviewed since prior progress note. Most recent are:  Vitals:    10/12/24 1129   BP: (!) 107/50   Pulse: 78   Resp: 16   Temp: 98.3 °F (36.8 °C)   SpO2: 97%         Intake/Output Summary (Last 24 hours) at 10/12/2024 1211  Last data filed at 10/12/2024 0510  Gross per 24 hour   Intake 384 ml   Output 600 ml   Net -216 ml        Physical Examination:     I had a face to face encounter with this patient and independently examined them on 10/12/2024 as outlined below:          General : alert x 1, awake, in mild distress, restless  HEENT: PEERL, EOMI, moist mucus membrane  Neck: supple, no JVD, no meningeal signs  Chest: Clear to auscultation bilaterally   CVS: S1 S2 heard, Capillary refill less than 2 seconds  Abd: soft/ non tender, non distended, BS physiological,   Ext: no clubbing, no cyanosis, no edema, brisk 2+ DP pulses  Neuro/Psych: CN 2-12 grossly intact, sensory grossly within normal limit,moves all extremities   Skin: warm     Data Review:    Review and/or order of clinical lab test  Review and/or order of tests in the radiology section of CPT  Review and/or order of tests in the medicine section of

## 2024-10-12 NOTE — CONSULTS
6:09 PM   Result Value Ref Range    Crossmatch expiration date 10/14/2024,2359     ABO/Rh O POSITIVE     Antibody Screen NEG     Unit Number [id number]     Product Code Blood Bank RC LR     Unit Divison 00     Dispense Status Blood Bank TRANSFUSED     Unit Issue Date/Time 031144264859     Product Code Blood Bank [id number]     Blood Bank Unit Type and Rh O POS     Blood Bank ISBT Product Blood Type 5100     Blood Bank Blood Product Expiration Date 574424485796     Crossmatch Result Compatible     Unit Number [id number]     Product Code Blood Bank RC LR     Unit Divison 00     Dispense Status Blood Bank ISSUED     Unit Issue Date/Time 898818724326     Product Code Blood Bank [id number]     Blood Bank Unit Type and Rh O POS     Blood Bank ISBT Product Blood Type 5100     Blood Bank Blood Product Expiration Date 639509836603     Crossmatch Result Compatible    PREPARE RBC (CROSSMATCH), 1 Units    Collection Time: 10/11/24  6:45 PM   Result Value Ref Range    History Check Historical check performed    PREPARE RBC (CROSSMATCH), 1 Units    Collection Time: 10/11/24  8:15 PM   Result Value Ref Range    History Check Historical check performed    Occult Blood, Fecal    Collection Time: 10/11/24  8:46 PM   Result Value Ref Range    POC Occult Blood, Fecal Negative NEG     Lactic Acid    Collection Time: 10/11/24  8:59 PM   Result Value Ref Range    Lactic Acid, Plasma 2.0 0.4 - 2.0 MMOL/L   Culture, Blood 1    Collection Time: 10/11/24 11:22 PM    Specimen: Blood   Result Value Ref Range    Special Requests NO SPECIAL REQUESTS  RIGHT  Antecubital        Culture NO GROWTH <24 HRS     Culture, Blood 2    Collection Time: 10/12/24  6:15 AM    Specimen: Blood   Result Value Ref Range    Special Requests NO SPECIAL REQUESTS  RIGHT  ARM        Culture NO GROWTH <24 HRS     CBC with Auto Differential    Collection Time: 10/12/24  6:15 AM   Result Value Ref Range    WBC 9.1 3.6 - 11.0 K/uL    RBC 3.03 (L) 3.80 - 5.20 M/uL

## 2024-10-12 NOTE — PLAN OF CARE
[facility] AM-PAC®      Basic Mobility Inpatient Short Form (6-Clicks) Version 2    How much help is needed turning from your back to your side while in a flat bed without using bedrails?: A Lot  How much help is needed moving from lying on your back to sitting on the side of a flat bed without using bedrails?: A Lot  How much help is needed moving to and from a bed to a chair?: A Lot  How much help is needed standing up from a chair using your arms?: A Lot  How much help is needed walking in hospital room?: Total  How much help is needed climbing 3-5 steps with a railing?: Total    -PAC Inpatient Mobility Raw Score : 10  -PAC Inpatient T-Scale Score : 32.29     Cutoff score <=171,2,3 had higher odds of discharging home with home health or need of SNF/IPR.    1. Yaima Pastor, Sarah Orozco, Juan Terry, Ingrid Mason, Charles Day, Valdez Pastor.  Validity of the AM-PAC “6-Clicks” Inpatient Daily Activity and Basic Mobility Short Forms. Physical Therapy Mar 2014, [phone]; DOI: 10.2522/ptj.49226072  2. Bhavesh SANABRIA, Terry J, Yoseph J, Young J. Association of AM-PAC \"6-Clicks\" Basic Mobility and Daily Activity Scores With Discharge Destination. Phys Ther. 2021 Apr 4;101(4):vfqp982. doi: 10.1093/ptj/ngxz159. PMID: 35061294.  3. Kerry GRAY, Belen BARKER, Olga S, Guille K, Adri S. Activity Measure for Post-Acute Care \"6-Clicks\" Basic Mobility Scores Predict Discharge Destination After Acute Care Hospitalization in Select Patient Groups: A Retrospective, Observational Study. Arch Rehabil Res Clin Transl. 2022 Jul 16;4(3):430380. doi: 10.1016/j.arrct.2022.627466. PMID: 44593250; PMCID: VVD6174319.  4. Darby TERRY, Nai S, Aristides W, Carolina P. AM-PAC Short Forms Manual 4.0. Revised 2/2020.                                                                                                                                                                                                                                Activity Tolerance:   Poor    After treatment:   Patient left in no apparent distress in bed, Call bell within reach, Bed/ chair alarm activated, and Side rails x3    COMMUNICATION/EDUCATION:   The patient's plan of care was discussed with: occupational therapist, registered nurse, and certified nursing assistant/patient care technician    Patient Education  Education Given To: Patient  Education Provided: Role of Therapy;Plan of Care;Transfer Training  Education Method: Demonstration;Verbal  Barriers to Learning: Cognition  Education Outcome: Continued education needed    Thank you for this referral.  Kisha Adam, PT  Minutes: 18      Physical Therapy Evaluation Charge Determination   History Examination Presentation Decision-Making   HIGH Complexity :3+ comorbidities / personal factors will impact the outcome/ POC  LOW Complexity : 1-2 Standardized tests and measures addressing body structure, function, activity limitation and / or participation in recreation  MEDIUM Complexity : Evolving with changing characteristics  AM-PAC  HIGH    Based on the above components, the patient evaluation is determined to be of the following complexity level: Low

## 2024-10-12 NOTE — H&P
History & Physical    Primary Care Provider: Carmen Aguero MD  Source of Information: Patient and chart review    History of Presenting Illness:   Rnona Arzola is a 89 y.o. female with history of urothelial carcinoma of bladder, hypertension, insomnia, chronic constipation who presented to the ER from nursing facility for evaluation of falls weakness, fatigue and malaise.  She reports near 1 week of severe weakness as well as symptoms into falls.  Sustained a head injury last week.  Today she lost her footing and fell backward and landed on her buttock.  Noted some pain in his sacrum after her fall.  No reported head injury with this fall.  When told about her anemia she does admit to intermittent episodes of occasional dark stools and occasionally bright red blood in her stools.  The patient denies any fever, chills, chest or abdominal pain, nausea, vomiting, cough, congestion, recent illness, palpitations, or dysuria.    Remarkable vitals on ER Presentation: Low labile BPs to 90s over 50s, heart rate to 102  Labs Remarkable for: Hemoglobin 5.8, potassium 2.8, UA: Infected  ER Images: X-ray of sacrum: Coccygeal abnormality  ER Rx: Rocephin 1 g     Review of Systems:  Pertinent items are noted in the History of Present Illness.     Past Medical History:   Diagnosis Date    Bladder tumor     Hypertension     Vitamin B deficiency     Vitamin D deficiency       Past Surgical History:   Procedure Laterality Date    ABDOMINAL EXPLORATION SURGERY      to remove a mass    BLADDER SURGERY N/A 8/22/2024    TRANSURETHRAL RESECTION OF BLADDER TUMOR performed by Trace Hernandez MD at [facility] MAIN OR    CATARACT EXTRACTION, BILATERAL      FOOT SURGERY Right     removed bone from right toe    HYSTERECTOMY (CERVIX STATUS UNKNOWN)      KELOID EXCISION Left     ear    KNEE ARTHROPLASTY Bilateral     TONSILLECTOMY       Prior to Admission medications    Medication Sig Start Date End Date Taking? Authorizing Provider

## 2024-10-12 NOTE — ED NOTES
ED TO INPATIENT SBAR HANDOFF    Patient Name: Ronna Arzola   :  1935  89 y.o.   MRN:  [id number]  ED Room #:  ER15/15     Situation  Code Status: Full Code   Allergies: Latex  Weight: Patient Vitals for the past 96 hrs (Last 3 readings):   Weight   10/11/24 1630 73 kg (161 lb)       Arrived from: home    Chief Complaint:   Chief Complaint   Patient presents with    Buttocks Pain       Hospital Problem/Diagnosis:  Principal Problem:    Symptomatic anemia  Resolved Problems:    * No resolved hospital problems. *      Mobility: limited bed mobility   ED Fall Risk: Presents to emergency department  because of falls (Syncope, seizure, or loss of consciousness): Yes, Age > 70: Yes, Altered Mental Status, Intoxication with alcohol or substance confusion (Disorientation, impaired judgment, poor safety awaremess, or inability to follow instructions): Yes, Impaired Mobility: Ambulates or transfers with assistive devices or assistance; Unable to ambulate or transer.: Yes, Nursing Judgement: Yes   Fell in ED or prior to admission: yes   Restraints: no     Sitter: no no  Family/Caregiver Present:     Neet to know social/safety information:     Background  History:   Past Medical History:   Diagnosis Date    Bladder tumor     Hypertension     Vitamin B deficiency     Vitamin D deficiency        Assessment    Abnormal Assessment Findings: hemoglobin  Imaging:   XR SACRUM COCCYX (MIN 2 VIEWS)   Final Result   Irregularity of the coccyx which may be chronic. Correlate for point   tenderness. Otherwise no acute fractures are identified                  Electronically signed by Gm Champagne      CT HEAD WO CONTRAST    (Results Pending)     Abnormal labs:   Abnormal Labs Reviewed   CBC WITH AUTO DIFFERENTIAL - Abnormal; Notable for the following components:       Result Value    RBC 2.44 (*)     Hemoglobin 5.8 (*)     Hematocrit 19.5 (*)     MCV 79.9 (*)     MCH 23.8 (*)     MCHC 29.7 (*)     RDW 16.2 (*)     Immature

## 2024-10-12 NOTE — PROGRESS NOTES
0100 TRANSFER - IN REPORT:    Verbal report received from E transfer on Ronna Arzola  being received from ED for routine progression of patient care      Report consisted of patient's Situation, Background, Assessment and   Recommendations(SBAR).     Information from the following report(s) ED SBAR was reviewed with the receiving nurse.    Opportunity for questions and clarification was provided.      Assessment completed upon patient's arrival to unit and care assumed. Pt agitated and restless. Resistive. Answers questions appropriately with eyes closed. Oriented x3. 1 unit of blood completed in ED prior to transfer. Preparing for 2nd unit.  0150 Pt continues to pull at lines, thrashing about in bed. MD notified for need for sitter. New order for sitter received.  0205 2nd unit of blood initiated, sitter and primary nurse at bedside. No adverse reactions at this time.  0500 pt to go down for CT when blood complete.  0530 Blood transfusion complete without difficulty.  0730 MD notified of critical potassium and pending CT. Ct to call when ready. Ok to start prn potassium repletion via IV. Reported off to Thais SCHAFFER. Sitter remains at bedside.

## 2024-10-12 NOTE — PROGRESS NOTES
Orders received, chart reviewed and patient evaluated by physical therapy. Pending progression with skilled acute physical therapy, recommend:    Moderate intensity short-term skilled physical therapy up to 5x/week    Full evaluation to follow.

## 2024-10-12 NOTE — PLAN OF CARE
Problem: Occupational Therapy - Adult  Goal: By Discharge: Performs self-care activities at highest level of function for planned discharge setting.  See evaluation for individualized goals.  Description: FUNCTIONAL STATUS PRIOR TO ADMISSION:  Pt unable to provide PLOF due to cognitive status. Pt admitted from SNF. Per chart, pt was mod I with cane prior to recent hospitalization/SNF.     HOME SUPPORT PRIOR TO ADMISSION: The patient lived with son.       Occupational Therapy Goals:  Initiated 10/12/2024  1.  Patient will perform grooming with Stand by Assist within 7 day(s).  2.  Patient will perform upper body dressing with Stand by Assist within 7 day(s).  3.  Patient will perform lower body dressing with Moderate Assist within 7 day(s).  4.  Patient will perform toilet transfers with Moderate Assist and Assist x1  within 7 day(s).  5.  Patient will perform all aspects of toileting with Moderate Assist within 7 day(s).  6.  Patient will participate in upper extremity therapeutic exercise/activities with Stand by Assist within 7 day(s).    7.  Patient will utilize energy conservation techniques during functional activities with verbal cues within 7 day(s).    Outcome: Progressing   OCCUPATIONAL THERAPY EVALUATION    Patient: Ronna Arzola (89 y.o. female)  Date: 10/12/2024  Primary Diagnosis: Contusion of scalp, initial encounter [S00.03XA]  Contusion of sacrum, initial encounter [S30.0XXA]  Symptomatic anemia [D64.9]  Urinary tract infection with hematuria, site unspecified [N39.0, R31.9]  Anemia, unspecified type [D64.9]         Precautions: Fall Risk                  ASSESSMENT :  Pt rec'd semi-reclined in bed, asleep, able to arouse with max multimodal cues.  The patient is limited by decreased strength, body mechanics, activity tolerance, endurance, safety awareness, cognition, command following, attention/concentration, and balance impacting safety and (I) with ADLs and functional mobility. Pt currently  requires mod-max A x 2 for functional mobility and up to total A for ADLs. Pt sat EOB and trialed sit>stand with eyes open and able to clear buttocks, but unable to stand fully upright. She is functioning well below her baseline of Mod (I) and will benefit from continued skilled OT to address the above impairments. Pt left side-lying in bed at end of session with 1:1 sitter present and needs met. RN notified of session.    Functional Outcome Measure:  The patient scored 12/24 on the AM-PAC outcome measure.       PLAN :  Recommendations and Planned Interventions:   self care training, therapeutic activities, functional mobility training, balance training, therapeutic exercise, endurance activities, cognitive retraining, patient education, home safety training, and family training/education    Frequency/Duration: OT Plan of Care: 5 times/week    Recommendation for discharge: (in order for the patient to meet his/her long term goals):   Moderate intensity short-term skilled occupational therapy up to 5x/week    Other factors to consider for discharge: patient's current support system is unable to meet their requirements for physical assistance, poor safety awareness, impaired cognition, high risk for falls, and concern for safely navigating or managing the home environment    IF patient discharges home will need the following DME: continuing to assess with progress       SUBJECTIVE:   Patient stated: pt did not verbalize  OBJECTIVE DATA SUMMARY:     Past Medical History:   Diagnosis Date    Bladder tumor     Hypertension     Vitamin B deficiency     Vitamin D deficiency      Past Surgical History:   Procedure Laterality Date    ABDOMINAL EXPLORATION SURGERY      to remove a mass    BLADDER SURGERY N/A 8/22/2024    TRANSURETHRAL RESECTION OF BLADDER TUMOR performed by Trace Hernandez MD at [facility] MAIN OR    CATARACT EXTRACTION, BILATERAL      FOOT SURGERY Right     removed bone from right toe    HYSTERECTOMY (CERVIX

## 2024-10-13 LAB
ABO + RH BLD: NORMAL
ANION GAP SERPL CALC-SCNC: 2 MMOL/L (ref 2–12)
BLD PROD TYP BPU: NORMAL
BLD PROD TYP BPU: NORMAL
BLOOD BANK BLOOD PRODUCT EXPIRATION DATE: NORMAL
BLOOD BANK BLOOD PRODUCT EXPIRATION DATE: NORMAL
BLOOD BANK DISPENSE STATUS: NORMAL
BLOOD BANK DISPENSE STATUS: NORMAL
BLOOD BANK ISBT PRODUCT BLOOD TYPE: 5100
BLOOD BANK ISBT PRODUCT BLOOD TYPE: 5100
BLOOD BANK PRODUCT CODE: NORMAL
BLOOD BANK PRODUCT CODE: NORMAL
BLOOD BANK UNIT TYPE AND RH: NORMAL
BLOOD BANK UNIT TYPE AND RH: NORMAL
BLOOD GROUP ANTIBODIES SERPL: NORMAL
BPU ID: NORMAL
BPU ID: NORMAL
BUN SERPL-MCNC: 6 MG/DL (ref 6–20)
BUN/CREAT SERPL: 9 (ref 12–20)
CALCIUM SERPL-MCNC: 8.4 MG/DL (ref 8.5–10.1)
CHLORIDE SERPL-SCNC: 114 MMOL/L (ref 97–108)
CO2 SERPL-SCNC: 28 MMOL/L (ref 21–32)
CREAT SERPL-MCNC: 0.65 MG/DL (ref 0.55–1.02)
CROSSMATCH RESULT: NORMAL
CROSSMATCH RESULT: NORMAL
ERYTHROCYTE [DISTWIDTH] IN BLOOD BY AUTOMATED COUNT: 16.2 % (ref 11.5–14.5)
FOLATE SERPL-MCNC: 5.5 NG/ML (ref 5–21)
GLUCOSE SERPL-MCNC: 79 MG/DL (ref 65–100)
HCT VFR BLD AUTO: 29.3 % (ref 35–47)
HGB BLD-MCNC: 8.9 G/DL (ref 11.5–16)
MCH RBC QN AUTO: 25.1 PG (ref 26–34)
MCHC RBC AUTO-ENTMCNC: 30.4 G/DL (ref 30–36.5)
MCV RBC AUTO: 82.5 FL (ref 80–99)
NRBC # BLD: 0 K/UL (ref 0–0.01)
NRBC BLD-RTO: 0 PER 100 WBC
PLATELET # BLD AUTO: 382 K/UL (ref 150–400)
PMV BLD AUTO: 9 FL (ref 8.9–12.9)
POTASSIUM SERPL-SCNC: 3.8 MMOL/L (ref 3.5–5.1)
RBC # BLD AUTO: 3.55 M/UL (ref 3.8–5.2)
RETICS # AUTO: 0.09 M/UL (ref 0.02–0.08)
RETICS/RBC NFR AUTO: 2.5 % (ref 0.7–2.1)
SODIUM SERPL-SCNC: 144 MMOL/L (ref 136–145)
SPECIMEN EXP DATE BLD: NORMAL
UNIT DIVISION: 0
UNIT DIVISION: 0
UNIT ISSUE DATE/TIME: NORMAL
UNIT ISSUE DATE/TIME: NORMAL
VIT B12 SERPL-MCNC: 1016 PG/ML (ref 193–986)
WBC # BLD AUTO: 11.5 K/UL (ref 3.6–11)

## 2024-10-13 PROCEDURE — 6370000000 HC RX 637 (ALT 250 FOR IP): Performed by: STUDENT IN AN ORGANIZED HEALTH CARE EDUCATION/TRAINING PROGRAM

## 2024-10-13 PROCEDURE — 82746 ASSAY OF FOLIC ACID SERUM: CPT

## 2024-10-13 PROCEDURE — 1100000000 HC RM PRIVATE

## 2024-10-13 PROCEDURE — 85045 AUTOMATED RETICULOCYTE COUNT: CPT

## 2024-10-13 PROCEDURE — 2580000003 HC RX 258: Performed by: STUDENT IN AN ORGANIZED HEALTH CARE EDUCATION/TRAINING PROGRAM

## 2024-10-13 PROCEDURE — 6370000000 HC RX 637 (ALT 250 FOR IP): Performed by: INTERNAL MEDICINE

## 2024-10-13 PROCEDURE — 80048 BASIC METABOLIC PNL TOTAL CA: CPT

## 2024-10-13 PROCEDURE — 36415 COLL VENOUS BLD VENIPUNCTURE: CPT

## 2024-10-13 PROCEDURE — 82607 VITAMIN B-12: CPT

## 2024-10-13 PROCEDURE — 85027 COMPLETE CBC AUTOMATED: CPT

## 2024-10-13 PROCEDURE — 6360000002 HC RX W HCPCS: Performed by: STUDENT IN AN ORGANIZED HEALTH CARE EDUCATION/TRAINING PROGRAM

## 2024-10-13 RX ORDER — HYDROCORTISONE ACETATE 25 MG/1
25 SUPPOSITORY RECTAL 2 TIMES DAILY
Status: DISPENSED | OUTPATIENT
Start: 2024-10-13 | End: 2024-10-19

## 2024-10-13 RX ADMIN — ATORVASTATIN CALCIUM 10 MG: 10 TABLET, FILM COATED ORAL at 10:16

## 2024-10-13 RX ADMIN — PRAZOSIN HYDROCHLORIDE 1 MG: 1 CAPSULE ORAL at 20:57

## 2024-10-13 RX ADMIN — ACETAMINOPHEN 650 MG: 325 TABLET ORAL at 06:54

## 2024-10-13 RX ADMIN — WATER 1000 MG: 1 INJECTION INTRAMUSCULAR; INTRAVENOUS; SUBCUTANEOUS at 10:14

## 2024-10-13 RX ADMIN — ESCITALOPRAM OXALATE 5 MG: 10 TABLET ORAL at 10:16

## 2024-10-13 RX ADMIN — SODIUM CHLORIDE, PRESERVATIVE FREE 10 ML: 5 INJECTION INTRAVENOUS at 20:57

## 2024-10-13 RX ADMIN — HYDROCORTISONE ACETATE 25 MG: 25 SUPPOSITORY RECTAL at 14:31

## 2024-10-13 RX ADMIN — HYDROCORTISONE ACETATE 25 MG: 25 SUPPOSITORY RECTAL at 20:57

## 2024-10-13 RX ADMIN — SODIUM CHLORIDE, PRESERVATIVE FREE 10 ML: 5 INJECTION INTRAVENOUS at 10:16

## 2024-10-13 ASSESSMENT — PAIN SCALES - GENERAL
PAINLEVEL_OUTOF10: 0
PAINLEVEL_OUTOF10: 4
PAINLEVEL_OUTOF10: 4
PAINLEVEL_OUTOF10: 5
PAINLEVEL_OUTOF10: 0
PAINLEVEL_OUTOF10: 0

## 2024-10-13 ASSESSMENT — PAIN DESCRIPTION - LOCATION
LOCATION: BUTTOCKS
LOCATION: BUTTOCKS

## 2024-10-13 NOTE — PROGRESS NOTES
to the best of my ability given all available resources at the time of admission. Route is PO if not otherwise noted.        Medications Reviewed:     Current Facility-Administered Medications   Medication Dose Route Frequency    hydrocortisone (ANUSOL-HC) suppository 25 mg  25 mg Rectal BID    cefTRIAXone (ROCEPHIN) 1,000 mg in sterile water 10 mL IV syringe  1,000 mg IntraVENous Q24H    sodium chloride flush 0.9 % injection 5-40 mL  5-40 mL IntraVENous 2 times per day    sodium chloride flush 0.9 % injection 5-40 mL  5-40 mL IntraVENous PRN    0.9 % sodium chloride infusion   IntraVENous PRN    potassium chloride (KLOR-CON) extended release tablet 40 mEq  40 mEq Oral PRN    Or    potassium bicarb-citric acid (EFFER-K) effervescent tablet 40 mEq  40 mEq Oral PRN    Or    potassium chloride 10 mEq/100 mL IVPB (Peripheral Line)  10 mEq IntraVENous PRN    magnesium sulfate 2000 mg in 50 mL IVPB premix  2,000 mg IntraVENous PRN    ondansetron (ZOFRAN-ODT) disintegrating tablet 4 mg  4 mg Oral Q8H PRN    Or    ondansetron (ZOFRAN) injection 4 mg  4 mg IntraVENous Q6H PRN    polyethylene glycol (GLYCOLAX) packet 17 g  17 g Oral Daily PRN    acetaminophen (TYLENOL) tablet 650 mg  650 mg Oral Q6H PRN    Or    acetaminophen (TYLENOL) suppository 650 mg  650 mg Rectal Q6H PRN    oxyCODONE (ROXICODONE) immediate release tablet 5 mg  5 mg Oral Q6H PRN    [Held by provider] aspirin EC tablet 81 mg  81 mg Oral Daily    atorvastatin (LIPITOR) tablet 10 mg  10 mg Oral Daily    escitalopram (LEXAPRO) tablet 5 mg  5 mg Oral QAM    [Held by provider] gabapentin (NEURONTIN) capsule 100 mg  100 mg Oral TID    [Held by provider] mirtazapine (REMERON) tablet 7.5 mg  7.5 mg Oral Nightly    prazosin (MINIPRESS) capsule 1 mg  1 mg Oral Nightly     ______________________________________________________________________  EXPECTED LENGTH OF STAY: 2  ACTUAL LENGTH OF STAY:          1                 Soco M Troy, MD

## 2024-10-13 NOTE — PROGRESS NOTES
[facility]  [address] [place] [address]             GI PROGRESS NOTE        NAME: Ronna Arzola   : 1935   MRN: [id number]       Subjective:   Patient mental status improved today  She is sitting up   Sitter in the room      Objective:   She reports poor appetite  Complaining of rectal pain  No melena or hematochezia  Hemoglobin down to 7.6    VITALS:   Last 24hrs VS reviewed since prior progress note. Most recent are:  Vitals:    10/13/24 1216   BP: (!) 140/57   Pulse: 87   Resp: 16   Temp: 97.9 °F (36.6 °C)   SpO2: 99%       Intake/Output Summary (Last 24 hours) at 10/13/2024 1243  Last data filed at 10/13/2024 1000  Gross per 24 hour   Intake 614.94 ml   Output 0 ml   Net 614.94 ml       PHYSICAL EXAM:  General: Alert, in no acute distress    HEENT: Anicteric sclerae.  Lungs:            CTA Bilaterally.   Heart:  Regular  rhythm,    Abdomen: Soft, Non distended, Non tender.  (+)Bowel sounds, no HSM  Extremities: No c/c/e  Neurologic:   Alert and communicative   Psych:   Cooperative  Rectal: Brown stool small prolapsing nonbleeding hemorrhoids  Lab Data Reviewed:   Recent Labs     10/12/24  0615 10/12/24  1749 10/13/24  0003   WBC 9.1  --  11.5*   HGB 7.6* 8.9* 8.9*   HCT 24.5* 28.4* 29.3*     --  382     Recent Labs     10/12/24  1351 10/13/24  0003    144   K 3.5 3.8   * 114*   CO2 31 28   BUN 8 6     Recent Labs     10/11/24  1701   GLOB 3.1       ________________________________________________________________________  Patient Active Problem List   Diagnosis    Arthritis of knee, degenerative    Restless leg syndrome    B12 deficiency    Malignant neoplasm of overlapping sites of bladder (HCC)    Symptomatic anemia    Acute blood loss anemia        Assessment:   Chronic anemia without any evidence of GI blood loss.  Rectal exam showed brown stool and small prolapsing nonbleeding internal hemorrhoids  Patient had negative FOBT   Plan:

## 2024-10-13 NOTE — PROGRESS NOTES
0900- Food tray arrived. Attempted to wake patient and offer breakfast. Patient declined eggs, waffle, sausage, coffee, milk, and apple sauce. \"I want to sleep.\" Will try again later.     0930- Patient refused meal tray.    1000- Patient's IV is out. KARIME Plascencia notified.      1245- Food tray arrived. Patient was open to a few spoonfuls of the turkey with gravy. \"Not interested\" with the carrots, stuffing, or fruit.     1705- Food tray arrived. Patient ate a few spoonfuls of everything (Lasagna, green beans, and yellow cake). \"That's enough.\"

## 2024-10-13 NOTE — PROGRESS NOTES
VSS, assessment completed, 1:1 safety sitter at bedside.   Pt noticed to be responsive to questions and painful stimuli. But intermittent periods of exclamation \"please help me!\" Followed with immediate sleep. Process continued multiple times an hr. Sitter at bedside to maintain safety.

## 2024-10-14 ENCOUNTER — HOSPITAL ENCOUNTER (OUTPATIENT)
Facility: HOSPITAL | Age: 89
Discharge: HOME | End: 2024-10-17

## 2024-10-14 PROBLEM — C67.9 MALIGNANT NEOPLASM OF URINARY BLADDER (HCC): Status: ACTIVE | Noted: 2024-10-03

## 2024-10-14 LAB
ANION GAP SERPL CALC-SCNC: 7 MMOL/L (ref 2–12)
BUN SERPL-MCNC: 6 MG/DL (ref 6–20)
BUN/CREAT SERPL: 10 (ref 12–20)
CALCIUM SERPL-MCNC: 8.4 MG/DL (ref 8.5–10.1)
CHLORIDE SERPL-SCNC: 109 MMOL/L (ref 97–108)
CO2 SERPL-SCNC: 27 MMOL/L (ref 21–32)
CREAT SERPL-MCNC: 0.62 MG/DL (ref 0.55–1.02)
ERYTHROCYTE [DISTWIDTH] IN BLOOD BY AUTOMATED COUNT: 16.9 % (ref 11.5–14.5)
GLUCOSE SERPL-MCNC: 93 MG/DL (ref 65–100)
HCT VFR BLD AUTO: 28.5 % (ref 35–47)
HGB BLD-MCNC: 8.6 G/DL (ref 11.5–16)
MCH RBC QN AUTO: 24.8 PG (ref 26–34)
MCHC RBC AUTO-ENTMCNC: 30.2 G/DL (ref 30–36.5)
MCV RBC AUTO: 82.1 FL (ref 80–99)
NRBC # BLD: 0 K/UL (ref 0–0.01)
NRBC BLD-RTO: 0 PER 100 WBC
PLATELET # BLD AUTO: 341 K/UL (ref 150–400)
PMV BLD AUTO: 8.8 FL (ref 8.9–12.9)
POTASSIUM SERPL-SCNC: 3.3 MMOL/L (ref 3.5–5.1)
RBC # BLD AUTO: 3.47 M/UL (ref 3.8–5.2)
SODIUM SERPL-SCNC: 143 MMOL/L (ref 136–145)
WBC # BLD AUTO: 9.6 K/UL (ref 3.6–11)

## 2024-10-14 PROCEDURE — 99223 1ST HOSP IP/OBS HIGH 75: CPT | Performed by: INTERNAL MEDICINE

## 2024-10-14 PROCEDURE — 85027 COMPLETE CBC AUTOMATED: CPT

## 2024-10-14 PROCEDURE — 6370000000 HC RX 637 (ALT 250 FOR IP): Performed by: STUDENT IN AN ORGANIZED HEALTH CARE EDUCATION/TRAINING PROGRAM

## 2024-10-14 PROCEDURE — 99497 ADVNCD CARE PLAN 30 MIN: CPT

## 2024-10-14 PROCEDURE — 6370000000 HC RX 637 (ALT 250 FOR IP): Performed by: INTERNAL MEDICINE

## 2024-10-14 PROCEDURE — 97530 THERAPEUTIC ACTIVITIES: CPT

## 2024-10-14 PROCEDURE — 1100000000 HC RM PRIVATE

## 2024-10-14 PROCEDURE — 97116 GAIT TRAINING THERAPY: CPT

## 2024-10-14 PROCEDURE — 97535 SELF CARE MNGMENT TRAINING: CPT

## 2024-10-14 PROCEDURE — 2580000003 HC RX 258: Performed by: STUDENT IN AN ORGANIZED HEALTH CARE EDUCATION/TRAINING PROGRAM

## 2024-10-14 PROCEDURE — 99223 1ST HOSP IP/OBS HIGH 75: CPT

## 2024-10-14 PROCEDURE — 80048 BASIC METABOLIC PNL TOTAL CA: CPT

## 2024-10-14 PROCEDURE — 6360000002 HC RX W HCPCS: Performed by: STUDENT IN AN ORGANIZED HEALTH CARE EDUCATION/TRAINING PROGRAM

## 2024-10-14 RX ORDER — POTASSIUM CHLORIDE 750 MG/1
40 TABLET, EXTENDED RELEASE ORAL ONCE
Status: COMPLETED | OUTPATIENT
Start: 2024-10-14 | End: 2024-10-14

## 2024-10-14 RX ADMIN — SODIUM CHLORIDE, PRESERVATIVE FREE 10 ML: 5 INJECTION INTRAVENOUS at 22:13

## 2024-10-14 RX ADMIN — POTASSIUM CHLORIDE 40 MEQ: 750 TABLET, EXTENDED RELEASE ORAL at 10:47

## 2024-10-14 RX ADMIN — ATORVASTATIN CALCIUM 10 MG: 10 TABLET, FILM COATED ORAL at 09:18

## 2024-10-14 RX ADMIN — SODIUM CHLORIDE, PRESERVATIVE FREE 10 ML: 5 INJECTION INTRAVENOUS at 09:20

## 2024-10-14 RX ADMIN — HYDROCORTISONE ACETATE 25 MG: 25 SUPPOSITORY RECTAL at 22:13

## 2024-10-14 RX ADMIN — PRAZOSIN HYDROCHLORIDE 1 MG: 1 CAPSULE ORAL at 22:13

## 2024-10-14 RX ADMIN — OXYCODONE 5 MG: 5 TABLET ORAL at 07:00

## 2024-10-14 RX ADMIN — HYDROCORTISONE ACETATE 25 MG: 25 SUPPOSITORY RECTAL at 10:40

## 2024-10-14 RX ADMIN — ESCITALOPRAM OXALATE 5 MG: 10 TABLET ORAL at 09:19

## 2024-10-14 RX ADMIN — WATER 1000 MG: 1 INJECTION INTRAMUSCULAR; INTRAVENOUS; SUBCUTANEOUS at 10:40

## 2024-10-14 ASSESSMENT — PAIN SCALES - GENERAL
PAINLEVEL_OUTOF10: 3
PAINLEVEL_OUTOF10: 6
PAINLEVEL_OUTOF10: 0
PAINLEVEL_OUTOF10: 3

## 2024-10-14 ASSESSMENT — PAIN DESCRIPTION - DESCRIPTORS
DESCRIPTORS: SORE
DESCRIPTORS: OTHER (COMMENT)
DESCRIPTORS: OTHER (COMMENT)

## 2024-10-14 ASSESSMENT — PAIN DESCRIPTION - LOCATION
LOCATION: BUTTOCKS
LOCATION: RECTUM
LOCATION: RECTUM

## 2024-10-14 NOTE — PLAN OF CARE
Problem: Pain  Goal: Verbalizes/displays adequate comfort level or baseline comfort level  Outcome: Not Progressing     Problem: Safety - Adult  Goal: Free from fall injury  Outcome: Progressing     Problem: Skin/Tissue Integrity  Goal: Absence of new skin breakdown  Description: 1.  Monitor for areas of redness and/or skin breakdown  2.  Assess vascular access sites hourly  3.  Every 4-6 hours minimum:  Change oxygen saturation probe site  4.  Every 4-6 hours:  If on nasal continuous positive airway pressure, respiratory therapy assess nares and determine need for appliance change or resting period.  Outcome: Adequate for Discharge

## 2024-10-14 NOTE — ACP (ADVANCE CARE PLANNING)
Advance Care Planning      Palliative Medicine Provider (MD/NP)  Advance Care Planning (ACP) Conversation      Date of Conversation: 10/14/24  The patient and/or authorized decision maker consented to a voluntary Advance Care Planning conversation.   Individuals present for the conversation:   Patient, Legal healthcare agent named below, Chu Canales, Nubia Camilo, and Veronica    Legal Healthcare Agent(s): Parker Arzola      ACP documents available in EMR prior to discussion:  -None    Primary Palliative Diagnosis(es):  Bladder Cancer    Conversation Summary:  Disease understanding  -Your family understand the nature of your cancer and intent of treatment being palliative.      Discussion: reviewed patient's condition, options for care, pt wishes, code status, artificial nutrition, difference between palliative and hospice, discussed criteria for skilled care/rehab in detail.    Decisions:  Code Status: son agreed to DNR. DDNR completed   Goal: hospice  Disposition: return to LTC with the support of hospicee    Resuscitation Status:    Code Status: DNR    Outcomes / Completed Documentation:  An explanation of advance directives and their importance was provided and the following forms completed:    -Power of  for Healthcare, -Living Will, and -Portable DNR    If new document completed, original was provided to patient and/or family member.    Copy was placed for scanning into the [facility] EMR.      I spent 30 minutes providing separately identifiable ACP services with the patient and/or surrogate decision maker in a voluntary, in-person conversation discussing the patient's wishes and goals as detailed in the above note.       Hung Crowley, APRN - CNP

## 2024-10-14 NOTE — PLAN OF CARE
Problem: Physical Therapy - Adult  Goal: By Discharge: Performs mobility at highest level of function for planned discharge setting.  See evaluation for individualized goals.  Description: FUNCTIONAL STATUS PRIOR TO ADMISSION: Pt unable to provide due to cognitive status. Pt admitted from SNF. Per chart, pt was mod I with cane prior to recent hospitalization/SNF.     HOME SUPPORT PRIOR TO ADMISSION: The patient lived with son.    Physical Therapy Goals  Initiated 10/12/2024  1.  Patient will move from supine to sit and sit to supine in bed with contact guard assist within 7 day(s).    2.  Patient will perform sit to stand with minimal assistance within 7 day(s).  3.  Patient will transfer from bed to chair and chair to bed with minimal assistance using the least restrictive device within 7 day(s).  4.  Patient will ambulate with minimal assistance for 50 feet with the least restrictive device within 7 day(s).   5.  Patient will ascend/descend 2 stairs with one handrail(s) with moderate assistance within 7 day(s).    Outcome: Progressing     PHYSICAL THERAPY TREATMENT    Patient: Ronna Arzola (89 y.o. female)  Date: 10/14/2024  Diagnosis: Contusion of scalp, initial encounter [S00.03XA]  Contusion of sacrum, initial encounter [S30.0XXA]  Symptomatic anemia [D64.9]  Urinary tract infection with hematuria, site unspecified [N39.0, R31.9]  Anemia, unspecified type [D64.9]  Acute blood loss anemia [D62] Symptomatic anemia      Precautions: Fall Risk                      ASSESSMENT:  Patient continues to benefit from skilled PT services and is progressing towards goals. Pt seen for ongoing PT intervention, overall tolerating session well. Pt received in bed, long sitting, attempting to sit EOB - sitter present at bedside. Pt able to complete bed mobility with SBA, transfers and amb with CGA, demo'ing improvement in mobility tolerance compared to initial evaluation. Will continue to follow.          PLAN:  Patient

## 2024-10-14 NOTE — PROGRESS NOTES
[facility]  [address]  [place] [address] [phone]                     GI PROGRESS NOTE    Patient Name: Ronna Arzola      : 1935      MRN: [id number]  Admit Date: 10/11/2024  Today's Date: 10/14/2024  CC: anemia     Subjective:     Patient c/o rectal discomfort and constipation. Denies hematochezia or melena Tolerating diet well.     Objective:     Blood pressure 129/66, pulse 94, temperature 98.2 °F (36.8 °C), temperature source Oral, resp. rate 18, height 1.524 m (5'), weight 73.3 kg (161 lb 8 oz), SpO2 98%.    Physical Exam:  General appearance: cooperative, no distress, appears stated age  Skin: Extremities and face reveal no rashes.   HEENT: Sclerae anicteric.  Cardiovascular: Regular rate and rhythm.   Respiratory: Comfortable breathing with no accessory muscle use.   GI: Abdomen nondistended, soft, and nontender. Normal active bowel sounds. No enlargement of the liver or spleen.    Rectal: Deferred   Musculoskeletal: No pitting edema of the lower legs.     Neurological: Patient oriented X3.   Psychiatric: Mood appears appropriate with good judgement.  No anxiety or agitation.      Data Review:    Recent Results (from the past 24 hour(s))   Basic Metabolic Panel    Collection Time: 10/14/24  7:01 AM   Result Value Ref Range    Sodium 143 136 - 145 mmol/L    Potassium 3.3 (L) 3.5 - 5.1 mmol/L    Chloride 109 (H) 97 - 108 mmol/L    CO2 27 21 - 32 mmol/L    Anion Gap 7 2 - 12 mmol/L    Glucose 93 65 - 100 mg/dL    BUN 6 6 - 20 MG/DL    Creatinine 0.62 0.55 - 1.02 MG/DL    BUN/Creatinine Ratio 10 (L) 12 - 20      Est, Glom Filt Rate 86 >60 ml/min/1.73m2    Calcium 8.4 (L) 8.5 - 10.1 MG/DL   CBC    Collection Time: 10/14/24  7:01 AM   Result Value Ref Range    WBC 9.6 3.6 - 11.0 K/uL    RBC 3.47 (L) 3.80 - 5.20 M/uL    Hemoglobin 8.6 (L) 11.5 - 16.0 g/dL    Hematocrit 28.5 (L) 35.0 - 47.0 %    MCV 82.1 80.0 - 99.0 FL    MCH 24.8 (L) 26.0 - 34.0 PG    MCHC 30.2  30.0 - 36.5 g/dL    RDW 16.9 (H) 11.5 - 14.5 %    Platelets 341 150 - 400 K/uL    MPV 8.8 (L) 8.9 - 12.9 FL    Nucleated RBCs 0.0 0  WBC    nRBC 0.00 0.00 - 0.01 K/uL         Assessment / Plan :     Mrs. Arzola is 88 y/o female with multiple medical problems. Patient was admitted for fatigue and weakness. She was found to have severely decreased hemoglobin at 5.8, s/p 2 units PRBC with improvement of her hemoglobin to 7.6.     FOBT (-), BUN/creatinine ratio is normal. Rectal exam showed brown stool and small prolapsing nonbleeding internal hemorrhoids. No overt signs of bleeding.     Patient recently diagnosed with bladder cancer, Large bladder mass could be source of bleeding.     Hgb stable 8.6/8.9/8.9    - Palliative consulted  - Oncology consulted    No evidence of overt GI bleeding. Hgb stable. Patient with known bladder mass. Does not appear GI etiology of anemia. No need for endoscopic intervention. GI will sign off.   If signs of overt bleeding, please reach out to our service.        Patient Active Hospital Problem List:   Principal Problem:    Symptomatic anemia  Active Problems:    Acute blood loss anemia  Resolved Problems:    * No resolved hospital problems. *      Time Spent with Patient: 15  JUNG Hughes NP

## 2024-10-14 NOTE — PLAN OF CARE
Problem: Occupational Therapy - Adult  Goal: By Discharge: Performs self-care activities at highest level of function for planned discharge setting.  See evaluation for individualized goals.  Description: FUNCTIONAL STATUS PRIOR TO ADMISSION:  Pt unable to provide PLOF due to cognitive status. Pt admitted from SNF. Per chart, pt was mod I with cane prior to recent hospitalization/SNF.     HOME SUPPORT PRIOR TO ADMISSION: The patient lived with son.       Occupational Therapy Goals:  Initiated 10/12/2024  1.  Patient will perform grooming with Stand by Assist within 7 day(s).  2.  Patient will perform upper body dressing with Stand by Assist within 7 day(s).  3.  Patient will perform lower body dressing with Moderate Assist within 7 day(s).  4.  Patient will perform toilet transfers with Moderate Assist and Assist x1  within 7 day(s).  5.  Patient will perform all aspects of toileting with Moderate Assist within 7 day(s).  6.  Patient will participate in upper extremity therapeutic exercise/activities with Stand by Assist within 7 day(s).    7.  Patient will utilize energy conservation techniques during functional activities with verbal cues within 7 day(s).    Outcome: Progressing   OCCUPATIONAL THERAPY TREATMENT  Patient: Ronna Arzola (89 y.o. female)  Date: 10/14/2024  Primary Diagnosis: Contusion of scalp, initial encounter [S00.03XA]  Contusion of sacrum, initial encounter [S30.0XXA]  Symptomatic anemia [D64.9]  Urinary tract infection with hematuria, site unspecified [N39.0, R31.9]  Anemia, unspecified type [D64.9]  Acute blood loss anemia [D62]       Precautions: Fall Risk                Chart, occupational therapy assessment, plan of care, and goals were reviewed.    ASSESSMENT  Patient continues to benefit from skilled OT services and is progressing towards goals. Pt received semi-reclined in bed, agreeable to therapy, cleared by RN. Pt was SBA for bed mobility with intact sitting balance. Pt was CGA for  : Contact Guard Assistance and Standing at sink                         Pain Ratin/10         Activity Tolerance:   Fair  and requires rest breaks  Please refer to the flowsheet for vital signs taken during this treatment.    After treatment:   Patient left in no apparent distress sitting up in chair, Call bell within reach, Bed/ chair alarm activated, and sitter present    COMMUNICATION/EDUCATION:   The patient's plan of care was discussed with: physical therapist and registered nurse         Thank you for this referral.  Jeana Bosch OT  Minutes: 23

## 2024-10-14 NOTE — PROGRESS NOTES
[facility]                                                                                          Hospitalist Progress Note  Symone Kidd MD  Office Phone: [phone]        Date of Service:  10/14/2024  NAME:  Ronna Arzola  :  1935  MRN:  [id number]       Admission Summary:   Ronna Arzola is a 89 y.o. female with history of urothelial carcinoma of bladder, hypertension, insomnia, chronic constipation who presented to the ER from nursing facility for evaluation of falls weakness, fatigue and malaise.  She reports near 1 week of severe weakness as well as symptoms into falls.  Sustained a head injury last week.  Today she lost her footing and fell backward and landed on her buttock.  Noted some pain in his sacrum after her fall.  No reported head injury with this fall.  When told about her anemia she does admit to intermittent episodes of occasional dark stools and occasionally bright red blood in her stools.  The patient denies any fever, chills, chest or abdominal pain, nausea, vomiting, cough, congestion, recent illness, palpitations, or dysuria.       Interval history / Subjective:   Patient seen examined at bedside earlier feels well no significant acute complaints more alert today per patient's son is planning to come visit this afternoon       Assessment & Plan:        Acute blood loss anemia  Possibly 2/2 known bladder mass   - hemoglobin on admission, 5.8   - s/p 2U pRBC --> hgb 7.6   - FOBT negative   - GI consulted, no plans for GI work-up at this time   - Hold home asa   - Patient does have large bladder mass which may be source of bleeding, see below     Bladder Cancer  - [facility]; recent diagnosis about 6 weeks ago   - Likely contributing to anemia   - Was planned for meeting with radiation oncology this week; this may help with bleeding mass, rad onc consulted awaiting eval  -Medical oncology also consulted,

## 2024-10-14 NOTE — CONSULTS
[facility] [place] [facility]  [address] [place] [address]  W: [phone]  F: [phone]    Reason for Visit:   Ronna Arzola is a 89 y.o. female who is seen today in hospital at the request of Dr. Rivers for evaluation of bladder cancer, she is an established patient of Dr. Tay.    Treatment History:   Tentative Radiation/Gemcitabine     History of Present Illness:   Ronna Arzola is a 89 y.o. female with a new diagnosis of bladder cancer. She was admitted to the hospital with generalized weakness for over 6 months. UA showed hematuria. She has been experiencing hematuria for over 3-4 months. A CT showed bladder tumor extending through the right lateral wall of the bladder. She underwent a cystoscopy and TURBT which revealed poorly differentiated urothelial carcinoma. Additional history includes hypertension, insomnia, and chronic constipation. She lives [facility], an assisted living facility.   Ms. Arzola was brought to the 10/11/2024 ED s/p fall landing on her buttocks. Additionally, she noted rectal bleeding with worsening weakness and fatigue. Labs remarkable for Hgb 5.8, potassium 2.8 and UA reflexed to culture.   Ms. Arzola was unable to participate in my assessment due to her insomnia and reported confusion. Sitter at bedside. Palliative care consulted and plan to meet with family this evening.       Past Medical History:   Diagnosis Date    Bladder tumor     Hypertension     Vitamin B deficiency     Vitamin D deficiency       Past Surgical History:   Procedure Laterality Date    ABDOMINAL EXPLORATION SURGERY      to remove a mass    BLADDER SURGERY N/A 8/22/2024    TRANSURETHRAL RESECTION OF BLADDER TUMOR performed by Trace Hernandez MD at [facility] MAIN OR    CATARACT EXTRACTION, BILATERAL      FOOT SURGERY Right     removed bone from right toe    HYSTERECTOMY (CERVIX STATUS UNKNOWN)      KELOID EXCISION Left     ear    KNEE ARTHROPLASTY Bilateral

## 2024-10-14 NOTE — CARE COORDINATION
Care Management Initial Assessment       RUR: 20%  Readmission? No  1st IM letter given? Yes - 10/12  1st  letter given: No       10/14/24 2141   Service Assessment   Patient Orientation Alert and Oriented   Cognition Alert   History Provided By Patient   Primary Caregiver Other (Comment)  ([facility])   Accompanied By/Relationship ian Capps, [phone]   Support Systems Family Members;Other (Comment)  ([facility])   Patient's Healthcare Decision Maker is: Legal Next of Kin   PCP Verified by CM Yes  (Dr. Trammell)   Last Visit to PCP Within last 3 months   Can patient return to prior living arrangement Unknown at present   Ability to make needs known: Fair   Family able to assist with home care needs: Yes   Would you like for me to discuss the discharge plan with any other family members/significant others, and if so, who? Yes  (Parker Arzola, ian, [phone])   Financial Resources Medicare   Social/Functional History   Lives With Alone   Type of Home Apartment   Home Access Elevator   Bathroom Shower/Tub Walk-in shower   Bathroom Equipment Grab bars in shower;Shower chair   Home Equipment Cane;Rollator   Receives Help From Other (comment)  ([facility])     CM met with pt at bedside to confirm demographics and complete initial assessment. CM unsure if pt is a reliable historian. Per pt, she lives alone in an apartment at [facility]. Reports there is an elevator to access. Pt said she has a cane, walker, and rollator; said she uses cane most often. Pt reports having a walk in shower with a shower chair and grab bars. Pt reports her PCP is Dr. Trammell and said she saw them last week. Son lives in [place] but is here now. Son was meeting with palliative, who said plan is to do 10 days of radiation and then transition to hospice. Unit CM aware, will continue to follow.    OSITO Medrano

## 2024-10-14 NOTE — CONSULTS
Palliative Medicine  Patient Name: Ronna Arzola  YOB: 1935  MRN: [id number]  Age: 89 y.o.  Gender: female    Date of Initial Consult: 10/14/2024  Date of Service: 10/14/2024  Time: 4:43 PM  Provider: JUNG Linn CNP  Hospital Day: 4  Admit Date: 10/11/2024  Referring Provider: Soco Rivers MD      Reasons for Consultation:  Goals of Care    HISTORY OF PRESENT ILLNESS (HPI):   Ronna Arzola is a 89 y.o. female with a past medical history of urothelial carcinoma of bladder, hypertension, insomnia, chronic constipation, who was admitted on 10/11/2024 from LTC facility ([facility]) with complaints of frequent falls weakness, fatigue and malaise .     Psychosocial:  after 60 years of marriage, has two adult sons (Parker and Jesu), and 6-7 grand children.  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      PALLIATIVE DIAGNOSES:    Bladder Cancer  Delirium  Physical debility  Goals of care      ASSESSMENT AND PLAN:   Palliative team has been asked to meet with Mrs. Ronna Arzola to address goals of care.  Reviewed medical chart including admit H&P, consultant notes, MAR, and recent labs/imaging.  Mrs. Arzola was seen and evaluated, son, Parker, grandson, Ton, and Veronica VIVEROS at bedside. Introduced self and role of palliative.   At time of my arrival, she was resting in bed in no acute distress.  She is alert and oriented with some confusion/delirium noted.    The pt was not engaged, and did not participate in conversation.  The patient denies any fever, chills, chest or abdominal pain, nausea, vomiting, cough, congestion, recent illness, or palpitations.    Understanding of Illness/Discussion:   We discussed the patient's condition in detail. Family members verbalized a clear understanding of hospital events including issues leading to admission.   Family reported that they met with rad-onc provider, and want to proceed with palliative radiation to  oz)        Current Diet: ADULT DIET; Easy to Chew       PSYCHOSOCIAL/SPIRITUAL SCREENING:   Palliative IDT has assessed this patient for cultural preferences / practices and a referral made as appropriate to needs (Cultural Services, Patient Advocacy, Ethics, etc.)    Spiritual Affiliation: Nondenominational    Any spiritual / Oriental orthodox concerns:  [] Yes /  [x] No   If \"Yes\" to discuss with pastoral care during IDT     Does caregiver feel burdened by caring for their loved one:   [] Yes /  [x] No /  [] No Caregiver Present/Available [] No Caregiver [] Pt Lives at Facility  If \"Yes\" to discuss with social work during IDT    Anticipatory grief assessment:   [x] Normal  / [] Maladaptive     If \"Maladaptive\" to discuss with social work during IDT    ESAS Anxiety: Anxiety Score: Not anxious    ESAS Depression: Depression Score: Not depressed        LAB AND IMAGING FINDINGS:   Objective data reviewed:  labs, images, records, medication use, vitals, and chart     FINAL COMMENTS   Thank you for allowing Palliative Medicine to participate in the care of Ronna Arzola.    Only check if applicable and billing time based rather than MDM  [x] The total encounter time on this service date was 75 minutes which was spent performing a face-to-face encounter and personally completing the provider-level activities documented in the note. This includes time spent prior to the visit and after the visit in direct care of the patient. This time does not include time spent in any separately reportable services.    Electronically signed by   JUNG Linn CNP  Palliative Care Team  on 10/14/2024 at 4:43 PM

## 2024-10-15 ENCOUNTER — APPOINTMENT (OUTPATIENT)
Facility: HOSPITAL | Age: 89
DRG: 686 | End: 2024-10-15
Payer: MEDICARE

## 2024-10-15 ENCOUNTER — HOSPITAL ENCOUNTER (OUTPATIENT)
Facility: HOSPITAL | Age: 89
Discharge: HOME | End: 2024-10-18

## 2024-10-15 LAB
ANION GAP SERPL CALC-SCNC: 3 MMOL/L (ref 2–12)
BUN SERPL-MCNC: 5 MG/DL (ref 6–20)
BUN/CREAT SERPL: 8 (ref 12–20)
CALCIUM SERPL-MCNC: 8.3 MG/DL (ref 8.5–10.1)
CHLORIDE SERPL-SCNC: 112 MMOL/L (ref 97–108)
CO2 SERPL-SCNC: 28 MMOL/L (ref 21–32)
CREAT SERPL-MCNC: 0.62 MG/DL (ref 0.55–1.02)
ERYTHROCYTE [DISTWIDTH] IN BLOOD BY AUTOMATED COUNT: 17.4 % (ref 11.5–14.5)
GLUCOSE SERPL-MCNC: 98 MG/DL (ref 65–100)
HCT VFR BLD AUTO: 29.2 % (ref 35–47)
HGB BLD-MCNC: 8.7 G/DL (ref 11.5–16)
MCH RBC QN AUTO: 24.8 PG (ref 26–34)
MCHC RBC AUTO-ENTMCNC: 29.8 G/DL (ref 30–36.5)
MCV RBC AUTO: 83.2 FL (ref 80–99)
NRBC # BLD: 0 K/UL (ref 0–0.01)
NRBC BLD-RTO: 0 PER 100 WBC
PLATELET # BLD AUTO: 330 K/UL (ref 150–400)
PMV BLD AUTO: 9.1 FL (ref 8.9–12.9)
POTASSIUM SERPL-SCNC: 3.4 MMOL/L (ref 3.5–5.1)
RBC # BLD AUTO: 3.51 M/UL (ref 3.8–5.2)
SODIUM SERPL-SCNC: 143 MMOL/L (ref 136–145)
WBC # BLD AUTO: 8.2 K/UL (ref 3.6–11)

## 2024-10-15 PROCEDURE — 85027 COMPLETE CBC AUTOMATED: CPT

## 2024-10-15 PROCEDURE — 2580000003 HC RX 258: Performed by: STUDENT IN AN ORGANIZED HEALTH CARE EDUCATION/TRAINING PROGRAM

## 2024-10-15 PROCEDURE — 6360000002 HC RX W HCPCS: Performed by: STUDENT IN AN ORGANIZED HEALTH CARE EDUCATION/TRAINING PROGRAM

## 2024-10-15 PROCEDURE — 99231 SBSQ HOSP IP/OBS SF/LOW 25: CPT | Performed by: INTERNAL MEDICINE

## 2024-10-15 PROCEDURE — 80048 BASIC METABOLIC PNL TOTAL CA: CPT

## 2024-10-15 PROCEDURE — 1100000000 HC RM PRIVATE

## 2024-10-15 PROCEDURE — 97116 GAIT TRAINING THERAPY: CPT

## 2024-10-15 PROCEDURE — 77014 CT GUIDE PLACEMENT RADIATION THERAPY: CPT

## 2024-10-15 PROCEDURE — 6370000000 HC RX 637 (ALT 250 FOR IP): Performed by: INTERNAL MEDICINE

## 2024-10-15 PROCEDURE — 77290 THER RAD SIMULAJ FIELD CPLX: CPT

## 2024-10-15 PROCEDURE — 6370000000 HC RX 637 (ALT 250 FOR IP): Performed by: STUDENT IN AN ORGANIZED HEALTH CARE EDUCATION/TRAINING PROGRAM

## 2024-10-15 PROCEDURE — 92610 EVALUATE SWALLOWING FUNCTION: CPT

## 2024-10-15 RX ORDER — HYDROCORTISONE ACETATE 25 MG/1
25 SUPPOSITORY RECTAL 2 TIMES DAILY
Qty: 10 SUPPOSITORY | Refills: 0 | Status: SHIPPED | OUTPATIENT
Start: 2024-10-15 | End: 2024-10-20

## 2024-10-15 RX ORDER — CEFUROXIME AXETIL 250 MG/1
250 TABLET ORAL 2 TIMES DAILY
Qty: 8 TABLET | Refills: 0 | Status: SHIPPED | OUTPATIENT
Start: 2024-10-15 | End: 2024-10-19

## 2024-10-15 RX ADMIN — HYDROCORTISONE ACETATE 25 MG: 25 SUPPOSITORY RECTAL at 20:55

## 2024-10-15 RX ADMIN — PRAZOSIN HYDROCHLORIDE 1 MG: 1 CAPSULE ORAL at 20:55

## 2024-10-15 RX ADMIN — POTASSIUM CHLORIDE 40 MEQ: 750 TABLET, EXTENDED RELEASE ORAL at 06:56

## 2024-10-15 RX ADMIN — WATER 1000 MG: 1 INJECTION INTRAMUSCULAR; INTRAVENOUS; SUBCUTANEOUS at 10:34

## 2024-10-15 RX ADMIN — SODIUM CHLORIDE, PRESERVATIVE FREE 10 ML: 5 INJECTION INTRAVENOUS at 20:55

## 2024-10-15 RX ADMIN — HYDROCORTISONE ACETATE 25 MG: 25 SUPPOSITORY RECTAL at 10:38

## 2024-10-15 RX ADMIN — SODIUM CHLORIDE, PRESERVATIVE FREE 5 ML: 5 INJECTION INTRAVENOUS at 10:41

## 2024-10-15 ASSESSMENT — PAIN SCALES - GENERAL: PAINLEVEL_OUTOF10: 0

## 2024-10-15 NOTE — PROGRESS NOTES
I spoke with Dr. Tay, and he would like to see her first before starting any radiation or chemotherapy to determine how palliative vs aggressive our management of her care will be. I am comfortable holding off on radiation until he has had a chance to consider concurrent chemoradiation, but if at any point either during this hospital stay or upon discharge her bleeding becomes more severe, please contact me at [phone] (office) or perfectserve me or call my cell ([phone]) and alert me if the plan needs to shift to a more urgent/emergent palliation approach.

## 2024-10-15 NOTE — PROGRESS NOTES
I have been told that patient will not be getting chemo, so Dr. Tay and I discussed that she can not proceed with radiation in house to try to stop/slow the bleeding. I spoke to patient's daughter, who very strongly would like her to stay and get the radiation started, so my team is calling to try to delay her discharge so we can simulate her, start her treatment, and then we can evaluate for discharge (as long as wherever she is going upon discharge can allow her to continue her radiation treatments, otherwise she will need to stay in house until she completes this).

## 2024-10-15 NOTE — PLAN OF CARE
Speech LAnguage Pathology EVALUATION    Patient: Ronna Arzola (89 y.o. female)  Date: 10/15/2024  Primary Diagnosis: Contusion of scalp, initial encounter [S00.03XA]  Contusion of sacrum, initial encounter [S30.0XXA]  Symptomatic anemia [D64.9]  Urinary tract infection with hematuria, site unspecified [N39.0, R31.9]  Anemia, unspecified type [D64.9]  Acute blood loss anemia [D62]    Precautions: Aspiration, Fall Risk                  ASSESSMENT:  Patient participated in clinical swallow evaluation consisting of thin liquids, purees, and solids. Patient reports she can \"strangle\" at times and endorses globus sensation with solids. Patient has not had GI or SLP involvement in past. Patient with two instances of strong delayed cough response following mixed consistency (solids followed by liquid wash). Unclear if isolated cough response is a symptom of pharyngeal or more esophageal dysfunction; given patient's age, she is at risk for both presbyphagia and presbyesophagus. For all other PO trials, patient with no immediate or delayed clinical s/s of aspiration. There is option for further dysphagia diagnostics (e.g., FEES/MBS, Esophagram), but it is unclear if further objective testing aligns with overall medical goals of care, as patient is tentatively discharging to hospice after this hospitalization. Patient did not delineate clear preference when given option of further testing vs. resuming PO diet. Based on available information and discussion with patient, recommend PO diet of easy to chew with thin liquids with strict aspiration precautions as listed below.     Patient will benefit from skilled intervention to address the above impairments.     PLAN :  Recommendations and Planned Interventions:  Diet: Easy to chew and thin liquids  -Oral medications whole in purees  -Aspiration and esophageal precautions:     · Small and single bites and sips    · Slow rate of intake    · Alternate bites and sips    · Sit fully

## 2024-10-15 NOTE — PLAN OF CARE
commands consistently as well and managed self care at sink to wash hands and brush teeth.           PLAN:  Patient continues to benefit from skilled intervention to address the above impairments.  Continue treatment per established plan of care.    Recommend with staff: therapy recommendations for staff: Recommend mobility with staff assist x1 using gait belt and rolling walker.      Recommendation for discharge: (in order for the patient to meet his/her long term goals):   Intermittent physical therapy up to 2-3x/week in previous living setting with additional support needed for safety    Other factors to consider for discharge: impaired cognition    IF patient discharges home will need the following DME: patient owns DME required for discharge       SUBJECTIVE:   Patient stated, \"I feel okay.  Better today.\"    OBJECTIVE DATA SUMMARY:   Critical Behavior:   Alert; appropriate   A&Ox3    Functional Mobility Training:  Bed Mobility:  Bed Mobility Training  Overall Level of Assistance: Supervision  Supine to Sit: Supervision  Scooting: Supervision  Transfers:  Transfer Training  Sit to Stand: Supervision  Bed to Chair: Supervision  Balance:  Balance  Sitting: Intact  Sitting - Static: Good (unsupported)  Sitting - Dynamic: Good (unsupported)  Standing - Static: Good  Standing - Dynamic: Good   Ambulation/Gait Training:     Gait  Overall Level of Assistance: Stand-by assistance  Assistive Device: Gait belt;Walker, rolling  Base of Support: Widened  Speed/Lisa: Slow  Step Length: Right shortened;Left shortened  Gait Abnormalities: Decreased step clearance      Activity Tolerance:   Good    After treatment:   Patient left in no apparent distress sitting up in chair, Call bell within reach, and Caregiver / family present      COMMUNICATION/EDUCATION:   The patient's plan of care was discussed with: registered nurse and     Patient Education  Education Given To: Patient  Education Provided: Role of  Therapy;Plan of Care  Education Method: Verbal  Barriers to Learning: Cognition  Education Outcome: Verbalized understanding      PARAMJIT SCHMID, PT  Minutes: 16

## 2024-10-15 NOTE — PROGRESS NOTES
Spiritual Health History and Assessment/Progress Note  [facility]    Palliative Care,  ,  ,      Name: Ronna Arzola MRN: [id number]    Age: 89 y.o.     Sex: female   Language: English   Presybeterian: Hindu   Anemia     Date: 10/15/2024            Total Time Calculated: 15 min              Spiritual Assessment began in [facility] 6E MED ONCOLOGY        Referral/Consult From: Palliative Care   Encounter Overview/Reason: Palliative Care  Service Provided For: Patient not available    Stacie, Belief, Meaning:   Patient unable to assess at this time  Family/Friends No family/friends present      Importance and Influence:  Patient unable to assess at this time  Family/Friends No family/friends present    Community:  Patient Other:    Family/Friends No family/friends present    Assessment and Plan of Care:     Patient Interventions include: Other:    Family/Friends Interventions include: No family/friends present    Patient Plan of Care: Spiritual Care available upon further referral  Family/Friends Plan of Care: Spiritual Care available upon further referral  I attempted to visit Ronna Arzola for a palliative initial spiritual health assessment. The patient was sleeping. No family was present.     Electronically signed by SOM MARQUIS on 10/15/2024 at 8:21 AM

## 2024-10-15 NOTE — PROGRESS NOTES
[facility] [place] [facility]  [address], [address] [place] [address]  W: [phone]  F: [phone]    Reason for Visit:   Ronna Arzola is a 89 y.o. female who is seen today in hospital fu bladder cancer, she is an established patient of Dr. Tay.    Treatment History:   Tentative Radiation/Gemcitabine     History of Present Illness:     Seen today for fu  In bed.  Quiet.  Not conversant.  Sitter at bedside.   No family in.       Last visit:   Ronna Arzola is a 89 y.o. female with a new diagnosis of bladder cancer. She was admitted to the hospital with generalized weakness for over 6 months. UA showed hematuria. She has been experiencing hematuria for over 3-4 months. A CT showed bladder tumor extending through the right lateral wall of the bladder. She underwent a cystoscopy and TURBT which revealed poorly differentiated urothelial carcinoma. Additional history includes hypertension, insomnia, and chronic constipation. She lives [facility], an assisted living facility.   Ms. Arzola was brought to the 10/11/2024 ED s/p fall landing on her buttocks. Additionally, she noted rectal bleeding with worsening weakness and fatigue. Labs remarkable for Hgb 5.8, potassium 2.8 and UA reflexed to culture.   Ms. Arzola was unable to participate in my assessment due to her insomnia and reported confusion. Sitter at bedside. Palliative care consulted and plan to meet with family this evening.       Past Medical History:   Diagnosis Date    Bladder tumor     Hypertension     Vitamin B deficiency     Vitamin D deficiency       Past Surgical History:   Procedure Laterality Date    ABDOMINAL EXPLORATION SURGERY      to remove a mass    BLADDER SURGERY N/A 8/22/2024    TRANSURETHRAL RESECTION OF BLADDER TUMOR performed by Trace Hernandez MD at hospitals MAIN OR    CATARACT EXTRACTION, BILATERAL      FOOT SURGERY Right     removed bone from right toe    HYSTERECTOMY (CERVIX STATUS UNKNOWN)

## 2024-10-15 NOTE — CONSULTS
Symmetric  contrast enhancement and excretion.  STOMACH: Unremarkable.  SMALL BOWEL: Distal small bowel resection.  COLON: Diverticulosis without evidence of diverticulitis.  APPENDIX: Not seen  PERITONEUM: No ascites or pneumoperitoneum.  RETROPERITONEUM: No lymphadenopathy or aortic aneurysm.  REPRODUCTIVE ORGANS: Hysterectomy  URINARY BLADDER: There is an irregular mass along the right lateral wall of the  bladder measuring 4.1 x 4.7 cm, extending through the bladder wall into the  perivesicular fat. There is nonenhancing layering high density material in the  posterior and left lateral walls of the bladder, consistent with blood products.  BONES: Heterogeneous, lytic and sclerotic appearance of the sacrum and L3  vertebral body concerning for osseous metastatic disease versus Paget's disease,  as the appearance of the left hemipelvis is also enlarged with thickened  trabeculae.  ABDOMINAL WALL: No mass or hernia.  ADDITIONAL COMMENTS: N/A     IMPRESSION:  1.  Invasive mass extending through the right lateral wall of the bladder  consistent with bladder malignancy, with layering clot in the posterior and left  lateral aspects of the bladder.  2.  Heterogeneous sclerotic appearance of the sacrum and left iliac bone  concerning for either metastatic disease or Paget's disease.  3.  No retroperitoneal lymphadenopathy or evidence of solid organ metastatic  disease.     Electronically signed by CHYNA WARNER    PATHOLOGY:     FINAL PATHOLOGIC DIAGNOSIS          Urinary bladder, bladder chips, transurethral resection:        Poorly differentiated carcinoma morphologically compatible urothelial   carcinoma   Histologic grade: High-grade   Tumor extent: Invades lamina propria   Lymph-vascular invasion: Not identified   Muscularis propria: Present and uninvolved       LABS:   All labs were personally reviewed              Component  Ref Range & Units 10/14/24 0701 10/13/24 0003 10/12/24 1749 10/12/24 0615 10/11/24 1701

## 2024-10-15 NOTE — DISCHARGE INSTRUCTIONS
Discharge Instructions       PATIENT ID: Ronna Arzola  MRN: [id number]   YOB: 1935    DATE OF ADMISSION: 10/11/2024   DATE OF DISCHARGE: 10/15/2024    PRIMARY CARE PROVIDER: Carmen Aguero     ATTENDING PHYSICIAN: Rocio Fang MD   DISCHARGING PROVIDER: JUNG Caldwell NP    To contact this individual call [phone] and ask the  to page.   If unavailable ask to be transferred the Adult Hospitalist Department.    DISCHARGE DIAGNOSES     Anemia requiring Blood transfusion     CONSULTATIONS:     Hospitalist Medicine  Oncology   Palliative Medicine     PROCEDURES/SURGERIES: * No surgery found *    PENDING TEST RESULTS:   At the time of discharge the following test results are still pending: none     FOLLOW UP APPOINTMENTS:     As below     ADDITIONAL CARE RECOMMENDATIONS:     Recommend following up with Dr. Tay to further discuss treatment planning and whether or not you would benefit from radiation oncology treatments to the bladder   Please discuss hospice options with him as well, he will guide you when the time is right   Please continue all other medications except aspirin   Continue taking Ceftin for four additional days as prescribed to treat urinary tract infection   Continue rectal suppositories for internal hemorrhoid treatment for 5 more days     DIET: {diet:36754}  Oral Nutritional Supplements: {NUTRITION SUPPLEMENTS:762127}{Frequencies; times a day:28572}    ACTIVITY: activity as tolerated    WOUND CARE: none    EQUIPMENT needed: none    DISCHARGE MEDICATIONS:   See Medication Reconciliation Form    It is important that you take the medication exactly as they are prescribed.   Keep your medication in the bottles provided by the pharmacist and keep a list of the medication names, dosages, and times to be taken in your wallet.   Do not take other medications without consulting your doctor.       NOTIFY YOUR PHYSICIAN FOR ANY OF THE FOLLOWING:   Fever over

## 2024-10-15 NOTE — CARE COORDINATION
ASHLEY:    GABO spoke with Rhoda at [facility] in the [place]. She said patient can return back today, hospice consult noted but the Oncology team wants to see the patient OP prior to starting radiation or hospice. HH orders sent to [facility] with d/c summary. Her family will transport her back by 2:30 p.m.  RN can call report to [phone], and ask for Rhoda DIANA.     Patient verbalized understanding and gave permission for possible discharge within 4 hours of receiving IMM.    1453-  Rn notified gabo that patient is not discharging today due to starting radiation. CM cancelled discharge to [facility].       [facility]  Care Management Department  Ph:[phone]

## 2024-10-16 ENCOUNTER — HOSPITAL ENCOUNTER (OUTPATIENT)
Facility: HOSPITAL | Age: 89
End: 2024-10-16
Attending: RADIOLOGY
Payer: MEDICARE

## 2024-10-16 ENCOUNTER — APPOINTMENT (OUTPATIENT)
Facility: HOSPITAL | Age: 89
End: 2024-10-16
Attending: RADIOLOGY
Payer: MEDICARE

## 2024-10-16 ENCOUNTER — HOSPITAL ENCOUNTER (OUTPATIENT)
Facility: HOSPITAL | Age: 89
Discharge: HOME | End: 2024-10-19
Attending: RADIOLOGY

## 2024-10-16 LAB
BACTERIA SPEC CULT: NORMAL
SERVICE CMNT-IMP: NORMAL

## 2024-10-16 PROCEDURE — 6370000000 HC RX 637 (ALT 250 FOR IP): Performed by: STUDENT IN AN ORGANIZED HEALTH CARE EDUCATION/TRAINING PROGRAM

## 2024-10-16 PROCEDURE — 1100000000 HC RM PRIVATE

## 2024-10-16 PROCEDURE — 6370000000 HC RX 637 (ALT 250 FOR IP): Performed by: INTERNAL MEDICINE

## 2024-10-16 PROCEDURE — 6360000002 HC RX W HCPCS: Performed by: STUDENT IN AN ORGANIZED HEALTH CARE EDUCATION/TRAINING PROGRAM

## 2024-10-16 PROCEDURE — 2580000003 HC RX 258: Performed by: STUDENT IN AN ORGANIZED HEALTH CARE EDUCATION/TRAINING PROGRAM

## 2024-10-16 PROCEDURE — 97116 GAIT TRAINING THERAPY: CPT

## 2024-10-16 RX ADMIN — WATER 1000 MG: 1 INJECTION INTRAMUSCULAR; INTRAVENOUS; SUBCUTANEOUS at 10:05

## 2024-10-16 RX ADMIN — PRAZOSIN HYDROCHLORIDE 1 MG: 1 CAPSULE ORAL at 23:11

## 2024-10-16 RX ADMIN — SODIUM CHLORIDE, PRESERVATIVE FREE 10 ML: 5 INJECTION INTRAVENOUS at 23:11

## 2024-10-16 RX ADMIN — OXYCODONE 5 MG: 5 TABLET ORAL at 17:57

## 2024-10-16 RX ADMIN — ATORVASTATIN CALCIUM 10 MG: 10 TABLET, FILM COATED ORAL at 08:32

## 2024-10-16 RX ADMIN — SODIUM CHLORIDE, PRESERVATIVE FREE 10 ML: 5 INJECTION INTRAVENOUS at 08:32

## 2024-10-16 RX ADMIN — ESCITALOPRAM OXALATE 5 MG: 10 TABLET ORAL at 08:31

## 2024-10-16 RX ADMIN — HYDROCORTISONE ACETATE 25 MG: 25 SUPPOSITORY RECTAL at 08:32

## 2024-10-16 ASSESSMENT — PAIN DESCRIPTION - ORIENTATION: ORIENTATION: LEFT;RIGHT

## 2024-10-16 ASSESSMENT — PAIN SCALES - GENERAL
PAINLEVEL_OUTOF10: 5
PAINLEVEL_OUTOF10: 0
PAINLEVEL_OUTOF10: 0

## 2024-10-16 ASSESSMENT — PAIN DESCRIPTION - LOCATION: LOCATION: LEG

## 2024-10-16 ASSESSMENT — PAIN - FUNCTIONAL ASSESSMENT: PAIN_FUNCTIONAL_ASSESSMENT: ACTIVITIES ARE NOT PREVENTED

## 2024-10-16 ASSESSMENT — PAIN DESCRIPTION - DESCRIPTORS: DESCRIPTORS: CRAMPING

## 2024-10-16 NOTE — PROGRESS NOTES
Occupational Therapy:     Chart reviewed up to date. Attempted to see pt x2. First attempt pt was eating breakfast, encouraged OOB eating in chair, pt politely decline.  Second time, Pt requesting to rest at this time, and take a nap. Will defer for now and attempt back later as able.    Thank you,  Jeana Bosch OTD, OTR/L

## 2024-10-16 NOTE — PROGRESS NOTES
Arrived in the XRT dept today and was placed on the tx table.   Unfortunately, Mrs. Arzola could not remain still for the imaging required before the XRT.  She reported \"restless leg syndrome\" as the cause.  She agreed to come tomorrow and requested an in-house Pastoral care visit. Our RN will call the floor to determine if any new meds can be given to help her hold still.  KG

## 2024-10-16 NOTE — PROGRESS NOTES
see below     Bladder Cancer  [facility]; recent diagnosis about 6 weeks ago   - Likely contributing to anemia   - Was planned for meeting with radiation oncology this week; this may help with bleeding mass, rad onc consulted awaiting eval  -Medical oncology also consulted, palliative care consult  ongoing [facility] discussions; patient's son and his wife will be in [place] Monday afternoon and also available by phone     Abnormal UA   Concern for acute cystitis   - UA concerning for infection; Ucx with contamination   - IV rocephin x 5 days     Metabolic encephalopathy   - Possibly 2/2 acute anemia and suspected cystitis   - Just moved to assisted living facility this week   - Per family, mental status wax and wanes, with intermittent confusion   - May have some mild cognitive impairment per family but has not been officially diagnosed   - CT Head WO without acute process  - Management of the above conditions   - Sitter at bedside for safety   - Will hold gabapentin and remeron as these may be contributing to her mental status changes     External hemorrhoid  - Suppositories     Frequent falls and deconditioning   - Likely 2/2 above conditions   - PT/OT   - Lives in assisted living     Mood disorder  Restless Leg Syndrome   HLD  Night terrors  - Continue home medications      Code status: full   Prophylaxis: SCDs  Care Plan discussed with: Patient/family, RN  Anticipated Disposition: >48h to SNF   Inpatient  Cardiac monitoring: Remote Telemetry  Central Line:            Social Determinants of Health     Tobacco Use: Low Risk  (8/22/2024)    Patient History     Smoking Tobacco Use: Never     Smokeless Tobacco Use: Never     Passive Exposure: Not on file   Alcohol Use: Alcohol Misuse (8/16/2024)    AUDIT-C     Frequency of Alcohol Consumption: 4 or more times a week     Average Number of Drinks: 3 or 4     Frequency of Binge Drinking: Daily or almost daily   Financial Resource Strain: Not on file  grossly intact, sensory grossly within normal limit,moves all extremities   Skin: warm   : nonbleeding external hemorrhoid on rectal exam     Data Review:    Review and/or order of clinical lab test  Review and/or order of tests in the radiology section of CPT  Review and/or order of tests in the medicine section of CPT      I have personally and independently reviewed all pertinent labs, diagnostic studies, imaging, and have provided independent interpretation of the same.     Labs:     Recent Labs     10/14/24  0701 10/15/24  0703   WBC 9.6 8.2   HGB 8.6* 8.7*   HCT 28.5* 29.2*    330     Recent Labs     10/13/24  0003 10/14/24  0701 10/15/24  0703    143 143   K 3.8 3.3* 3.4*   * 109* 112*   CO2 28 27 28   BUN 6 6 5*     No results for input(s): \"ALT\", \"TP\", \"GLOB\", \"GGT\" in the last 72 hours.    Invalid input(s): \"SGOT\", \"GPT\", \"AP\", \"TBIL\", \"TBILI\", \"ALB\", \"AML\", \"AMYP\", \"LPSE\", \"HLPSE\"    No results for input(s): \"INR\", \"APTT\" in the last 72 hours.    Invalid input(s): \"PTP\"   No results for input(s): \"TIBC\" in the last 72 hours.    Invalid input(s): \"FE\", \"PSAT\", \"FERR\"     No results found for: \"RBCF\"   No results for input(s): \"PH\", \"PCO2\", \"PO2\" in the last 72 hours.  No results for input(s): \"CPK\" in the last 72 hours.    Invalid input(s): \"CPKMB\", \"CKNDX\", \"TROIQ\"  No results found for: \"CHOL\", \"CHLST\", \"CHOLV\", \"HDL\", \"HDLC\", \"LDL\", \"LDLC\"  No results found for: \"GLUCPOC\"  [unfilled]    Notes reviewed from all clinical/nonclinical/nursing services involved in patient's clinical care. Care coordination discussions were held with appropriate clinical/nonclinical/ nursing providers based on care coordination needs.         Patients current active Medications were reviewed, considered, added and adjusted based on the clinical condition today.      Home Medications were reconciled to the best of my ability given all available resources at the time of admission. Route is PO if not otherwise

## 2024-10-16 NOTE — PROGRESS NOTES
Spiritual Health History and Assessment/Progress Note  [facility]    Palliative Care,  , Adjustment to illness,      Name: Ronna Arzola MRN: [id number]    Age: 89 y.o.     Sex: female   Language: English   Moravian: Holiness   Anemia     Date: 10/16/2024            Total Time Calculated: 45 min              Spiritual Assessment began in [facility] 6E MED ONCOLOGY        Referral/Consult From: Nurse   Encounter Overview/Reason: Palliative Care  Service Provided For: Patient and family together    Stacie, Belief, Meaning:   Patient identifies as spiritual  Family/Friends identify as spiritual      Importance and Influence:  Patient has no beliefs influential to healthcare decision-making identified during this visit  Family/Friends have no beliefs influential to healthcare decision-making identified during this visit    Community:  Patient feels well-supported. Support system includes: Children and Extended family  Family/Friends feel well-supported. Support system includes: Spouse/Partner and Extended family    Assessment and Plan of Care:     Patient Interventions include: Facilitated expression of thoughts and feelings, Explored spiritual coping/struggle/distress, and Affirmed coping skills/support systems  Family/Friends Interventions include: Facilitated expression of thoughts and feelings    Patient Plan of Care: Spiritual Care available upon further referral  Family/Friends Plan of Care: Spiritual Care available upon further referral  I visited Ronna Arzola in response to a request from a member of the multi-disciplinary team.   The patient's son Parker and daughter-in-law Veronica were present.  The patient spoke of her fear when going down for radiation. We spoke of the fear and looked at ways she can overcome the angst and fear she experienced today.    Electronically signed by SOM MARQUIS on 10/16/2024 at 4:55 PM

## 2024-10-16 NOTE — PLAN OF CARE
Problem: Physical Therapy - Adult  Goal: By Discharge: Performs mobility at highest level of function for planned discharge setting.  See evaluation for individualized goals.  Description: FUNCTIONAL STATUS PRIOR TO ADMISSION: Pt unable to provide due to cognitive status. Pt admitted from SNF. Per chart, pt was mod I with cane prior to recent hospitalization/SNF.     HOME SUPPORT PRIOR TO ADMISSION: The patient lived with son.    Physical Therapy Goals  Initiated 10/12/2024  1.  Patient will move from supine to sit and sit to supine in bed with contact guard assist within 7 day(s).    2.  Patient will perform sit to stand with minimal assistance within 7 day(s).  3.  Patient will transfer from bed to chair and chair to bed with minimal assistance using the least restrictive device within 7 day(s).  4.  Patient will ambulate with minimal assistance for 50 feet with the least restrictive device within 7 day(s).   5.  Patient will ascend/descend 2 stairs with one handrail(s) with moderate assistance within 7 day(s).    Outcome: Progressing   PHYSICAL THERAPY TREATMENT    Patient: Ronna Arzola (89 y.o. female)  Date: 10/16/2024  Diagnosis: Contusion of scalp, initial encounter [S00.03XA]  Contusion of sacrum, initial encounter [S30.0XXA]  Symptomatic anemia [D64.9]  Urinary tract infection with hematuria, site unspecified [N39.0, R31.9]  Anemia, unspecified type [D64.9]  Acute blood loss anemia [D62] Anemia      Precautions: Fall Risk                      ASSESSMENT:  Patient continues to benefit from skilled PT services and is progressing towards goals. Patient received in bed and most agreeable to therapy.  Continues to move well managing bed mobility and transfers.  Gait is steady today with increased tolerance for distance and no reports of SOB.  VSS.  Left up sitting in chair.         PLAN:  Patient continues to benefit from skilled intervention to address the above impairments.  Continue treatment per

## 2024-10-17 ENCOUNTER — APPOINTMENT (OUTPATIENT)
Facility: HOSPITAL | Age: 89
DRG: 686 | End: 2024-10-17
Payer: MEDICARE

## 2024-10-17 ENCOUNTER — APPOINTMENT (OUTPATIENT)
Facility: HOSPITAL | Age: 89
End: 2024-10-17
Attending: RADIOLOGY
Payer: MEDICARE

## 2024-10-17 ENCOUNTER — HOSPITAL ENCOUNTER (OUTPATIENT)
Facility: HOSPITAL | Age: 89
Discharge: HOME | End: 2024-10-20
Attending: RADIOLOGY
Payer: MEDICARE

## 2024-10-17 PROBLEM — R41.0 CONFUSION: Status: ACTIVE | Noted: 2024-10-17

## 2024-10-17 PROBLEM — Z51.5 PALLIATIVE CARE ENCOUNTER: Status: ACTIVE | Noted: 2024-10-17

## 2024-10-17 LAB
ABO + RH BLD: NORMAL
BACTERIA SPEC CULT: NORMAL
BASOPHILS # BLD: 0.1 K/UL (ref 0–0.1)
BASOPHILS NFR BLD: 0 % (ref 0–1)
BLOOD GROUP ANTIBODIES SERPL: NORMAL
DIFFERENTIAL METHOD BLD: ABNORMAL
EOSINOPHIL # BLD: 0.6 K/UL (ref 0–0.4)
EOSINOPHIL NFR BLD: 5 % (ref 0–7)
ERYTHROCYTE [DISTWIDTH] IN BLOOD BY AUTOMATED COUNT: 17.3 % (ref 11.5–14.5)
GLUCOSE BLD STRIP.AUTO-MCNC: 98 MG/DL (ref 65–117)
HCT VFR BLD AUTO: 32 % (ref 35–47)
HGB BLD-MCNC: 9.5 G/DL (ref 11.5–16)
IMM GRANULOCYTES # BLD AUTO: 0.1 K/UL (ref 0–0.04)
IMM GRANULOCYTES NFR BLD AUTO: 0 % (ref 0–0.5)
INR PPP: 1.1 (ref 0.9–1.1)
LYMPHOCYTES # BLD: 1.7 K/UL (ref 0.8–3.5)
LYMPHOCYTES NFR BLD: 15 % (ref 12–49)
MCH RBC QN AUTO: 25 PG (ref 26–34)
MCHC RBC AUTO-ENTMCNC: 29.7 G/DL (ref 30–36.5)
MCV RBC AUTO: 84.2 FL (ref 80–99)
MONOCYTES # BLD: 1.1 K/UL (ref 0–1)
MONOCYTES NFR BLD: 9 % (ref 5–13)
NEUTS SEG # BLD: 8.1 K/UL (ref 1.8–8)
NEUTS SEG NFR BLD: 71 % (ref 32–75)
NRBC # BLD: 0 K/UL (ref 0–0.01)
NRBC BLD-RTO: 0 PER 100 WBC
PLATELET # BLD AUTO: 363 K/UL (ref 150–400)
PMV BLD AUTO: 9.3 FL (ref 8.9–12.9)
PROTHROMBIN TIME: 11.7 SEC (ref 9–11.1)
RAD ONC ARIA COURSE FIRST TREATMENT DATE: NORMAL
RAD ONC ARIA COURSE ID: NORMAL
RAD ONC ARIA COURSE INTENT: NORMAL
RAD ONC ARIA COURSE LAST TREATMENT DATE: NORMAL
RAD ONC ARIA COURSE SESSION NUMBER: 1
RAD ONC ARIA COURSE START DATE: NORMAL
RAD ONC ARIA COURSE TREATMENT ELAPSED DAYS: 0
RAD ONC ARIA PLAN FRACTIONS TREATED TO DATE: 1
RAD ONC ARIA PLAN ID: NORMAL
RAD ONC ARIA PLAN PRESCRIBED DOSE PER FRACTION: 3 GY
RAD ONC ARIA PLAN PRIMARY REFERENCE POINT: NORMAL
RAD ONC ARIA PLAN TOTAL FRACTIONS PRESCRIBED: 10
RAD ONC ARIA PLAN TOTAL PRESCRIBED DOSE: 3000 CGY
RAD ONC ARIA REFERENCE POINT DOSAGE GIVEN TO DATE: 3 GY
RAD ONC ARIA REFERENCE POINT DOSAGE GIVEN TO DATE: 3.09 GY
RAD ONC ARIA REFERENCE POINT ID: NORMAL
RAD ONC ARIA REFERENCE POINT ID: NORMAL
RAD ONC ARIA REFERENCE POINT SESSION DOSAGE GIVEN: 3 GY
RAD ONC ARIA REFERENCE POINT SESSION DOSAGE GIVEN: 3.09 GY
RBC # BLD AUTO: 3.8 M/UL (ref 3.8–5.2)
SERVICE CMNT-IMP: NORMAL
SERVICE CMNT-IMP: NORMAL
SPECIMEN EXP DATE BLD: NORMAL
WBC # BLD AUTO: 11.7 K/UL (ref 3.6–11)

## 2024-10-17 PROCEDURE — 6360000004 HC RX CONTRAST MEDICATION: Performed by: RADIOLOGY

## 2024-10-17 PROCEDURE — 77014 HC CT TREATMENT PLAN: CPT

## 2024-10-17 PROCEDURE — 6370000000 HC RX 637 (ALT 250 FOR IP): Performed by: STUDENT IN AN ORGANIZED HEALTH CARE EDUCATION/TRAINING PROGRAM

## 2024-10-17 PROCEDURE — 36415 COLL VENOUS BLD VENIPUNCTURE: CPT

## 2024-10-17 PROCEDURE — 99231 SBSQ HOSP IP/OBS SF/LOW 25: CPT | Performed by: PHYSICAL MEDICINE & REHABILITATION

## 2024-10-17 PROCEDURE — 77412 RADIATION TX DELIVERY LVL 3: CPT

## 2024-10-17 PROCEDURE — 92526 ORAL FUNCTION THERAPY: CPT

## 2024-10-17 PROCEDURE — 86900 BLOOD TYPING SEROLOGIC ABO: CPT

## 2024-10-17 PROCEDURE — 6360000002 HC RX W HCPCS: Performed by: NURSE PRACTITIONER

## 2024-10-17 PROCEDURE — 82962 GLUCOSE BLOOD TEST: CPT

## 2024-10-17 PROCEDURE — 77280 THER RAD SIMULAJ FIELD SMPL: CPT

## 2024-10-17 PROCEDURE — 86901 BLOOD TYPING SEROLOGIC RH(D): CPT

## 2024-10-17 PROCEDURE — 2580000003 HC RX 258

## 2024-10-17 PROCEDURE — 85610 PROTHROMBIN TIME: CPT

## 2024-10-17 PROCEDURE — 6370000000 HC RX 637 (ALT 250 FOR IP): Performed by: INTERNAL MEDICINE

## 2024-10-17 PROCEDURE — 86850 RBC ANTIBODY SCREEN: CPT

## 2024-10-17 PROCEDURE — 6360000002 HC RX W HCPCS

## 2024-10-17 PROCEDURE — 99231 SBSQ HOSP IP/OBS SF/LOW 25: CPT | Performed by: INTERNAL MEDICINE

## 2024-10-17 PROCEDURE — 6370000000 HC RX 637 (ALT 250 FOR IP): Performed by: NURSE PRACTITIONER

## 2024-10-17 PROCEDURE — 1100000000 HC RM PRIVATE

## 2024-10-17 PROCEDURE — 85025 COMPLETE CBC W/AUTO DIFF WBC: CPT

## 2024-10-17 PROCEDURE — 2580000003 HC RX 258: Performed by: STUDENT IN AN ORGANIZED HEALTH CARE EDUCATION/TRAINING PROGRAM

## 2024-10-17 PROCEDURE — 74178 CT ABD&PLV WO CNTR FLWD CNTR: CPT

## 2024-10-17 RX ORDER — IOPAMIDOL 755 MG/ML
INJECTION, SOLUTION INTRAVASCULAR
Status: DISCONTINUED
Start: 2024-10-17 | End: 2024-10-17 | Stop reason: WASHOUT

## 2024-10-17 RX ORDER — GABAPENTIN 100 MG/1
100 CAPSULE ORAL 2 TIMES DAILY
Status: DISCONTINUED | OUTPATIENT
Start: 2024-10-17 | End: 2024-10-23 | Stop reason: HOSPADM

## 2024-10-17 RX ORDER — DIAZEPAM 10 MG/2ML
5 INJECTION, SOLUTION INTRAMUSCULAR; INTRAVENOUS ONCE
Status: COMPLETED | OUTPATIENT
Start: 2024-10-17 | End: 2024-10-17

## 2024-10-17 RX ORDER — IOPAMIDOL 755 MG/ML
100 INJECTION, SOLUTION INTRAVASCULAR
Status: COMPLETED | OUTPATIENT
Start: 2024-10-17 | End: 2024-10-17

## 2024-10-17 RX ADMIN — ESCITALOPRAM OXALATE 5 MG: 10 TABLET ORAL at 08:52

## 2024-10-17 RX ADMIN — GABAPENTIN 100 MG: 100 CAPSULE ORAL at 22:29

## 2024-10-17 RX ADMIN — PRAZOSIN HYDROCHLORIDE 1 MG: 1 CAPSULE ORAL at 22:29

## 2024-10-17 RX ADMIN — ATORVASTATIN CALCIUM 10 MG: 10 TABLET, FILM COATED ORAL at 08:52

## 2024-10-17 RX ADMIN — DIAZEPAM 5 MG: 5 INJECTION, SOLUTION INTRAMUSCULAR; INTRAVENOUS at 13:46

## 2024-10-17 RX ADMIN — WATER 2.5 MG: 1 INJECTION INTRAMUSCULAR; INTRAVENOUS; SUBCUTANEOUS at 02:23

## 2024-10-17 RX ADMIN — SODIUM CHLORIDE, PRESERVATIVE FREE 10 ML: 5 INJECTION INTRAVENOUS at 08:52

## 2024-10-17 RX ADMIN — IOPAMIDOL 100 ML: 755 INJECTION, SOLUTION INTRAVENOUS at 03:12

## 2024-10-17 RX ADMIN — GABAPENTIN 100 MG: 100 CAPSULE ORAL at 10:45

## 2024-10-17 RX ADMIN — HYDROCORTISONE ACETATE 25 MG: 25 SUPPOSITORY RECTAL at 22:29

## 2024-10-17 RX ADMIN — PANTOPRAZOLE SODIUM 40 MG: 40 INJECTION, POWDER, FOR SOLUTION INTRAVENOUS at 03:20

## 2024-10-17 ASSESSMENT — PAIN DESCRIPTION - DESCRIPTORS: DESCRIPTORS: DISCOMFORT

## 2024-10-17 ASSESSMENT — PAIN SCALES - GENERAL: PAINLEVEL_OUTOF10: 5

## 2024-10-17 ASSESSMENT — PAIN DESCRIPTION - ORIENTATION: ORIENTATION: RIGHT;LEFT

## 2024-10-17 ASSESSMENT — PAIN DESCRIPTION - LOCATION: LOCATION: LEG

## 2024-10-17 NOTE — PROGRESS NOTES
[facility] Hospitalist cross coverage (7p-7a) progress note:    Patient had two mild-moderate episodes of hematochezia. GI bleed scan pending. PPI BID, trend h&h. Patient is agitated, will check bg, trial zyprexa, and apply cardiac monitoring.    CT abd pelv w/wo:   1. No active GI bleed.  2. Large bladder mass, compatible with known neoplasm. Associated clot within  the urinary bladder lumen. No active hemorrhage is seen within the urinary bladder.  3. Cholecystolithiasis, with no CT evidence of acute cholecystitis.        [facility]

## 2024-10-17 NOTE — PROGRESS NOTES
[facility]                                                                                          Hospitalist Progress Note  JUNG Caldwell NP  Office Phone: [phone]        Date of Service:  10/16/2024  NAME:  Ronna Arzola  :  1935  MRN:  [id number]       Admission Summary:   Ronna Arzola is a 89 y.o. female with history of urothelial carcinoma of bladder, hypertension, insomnia, chronic constipation who presented to the ER from nursing facility for evaluation of falls weakness, fatigue and malaise.  She reports near 1 week of severe weakness as well as symptoms into falls.  Sustained a head injury last week.  Today she lost her footing and fell backward and landed on her buttock.  Noted some pain in his sacrum after her fall.  No reported head injury with this fall.  When told about her anemia she does admit to intermittent episodes of occasional dark stools and occasionally bright red blood in her stools.  The patient denies any fever, chills, chest or abdominal pain, nausea, vomiting, cough, congestion, recent illness, palpitations, or dysuria.       Interval history / Subjective:     Patient awake and alert today. Sitting up in the chair. She has good clarity today and insight into her disease process. She was unable to tolerate the imaging prior to XRT required. They sent her back up without a treatment. They will try again tomorrow, potentially giving her some sedating medications prior to going down.      Assessment & Plan:        Acute blood loss anemia  Possibly 2/2 known bladder mass   - hemoglobin on admission, 5.8   - s/p 2U pRBC --> hgb 7.6   - FOBT negative   - GI consulted, no plans for GI work-up at this time   - Hold home asa   - Patient does have large bladder mass which may be source of bleeding, see below     Bladder Cancer  - Follows with [facility]; recent diagnosis about 6 weeks ago   - Likely contributing to

## 2024-10-17 NOTE — PROGRESS NOTES
[facility] [place] [facility]  [address], [address] [place] [address]  W: [phone]  F: [phone]    Reason for Visit:   Ronna Arzola is a 89 y.o. female who is seen today in hospital fu bladder cancer, she is an established patient of Dr. Tay.    Treatment History:   Tentative Radiation/Gemcitabine as outpt at [facility]    History of Present Illness:     Seen today for fu  In bed.  Sleepy. Per nursing had a rough night with confusion.   No family present this am  ROS not obtainable       Last visit:   Ronna Arzola is a 89 y.o. female with a new diagnosis of bladder cancer. She was admitted to the hospital with generalized weakness for over 6 months. UA showed hematuria. She has been experiencing hematuria for over 3-4 months. A CT showed bladder tumor extending through the right lateral wall of the bladder. She underwent a cystoscopy and TURBT which revealed poorly differentiated urothelial carcinoma. Additional history includes hypertension, insomnia, and chronic constipation. She lives [facility], an assisted living facility.   Ms. Arzola was brought to the 10/11/2024 ED s/p fall landing on her buttocks. Additionally, she noted rectal bleeding with worsening weakness and fatigue. Labs remarkable for Hgb 5.8, potassium 2.8 and UA reflexed to culture.   Ms. Arzola was unable to participate in my assessment due to her insomnia and reported confusion. Sitter at bedside. Palliative care consulted and plan to meet with family this evening.       Past Medical History:   Diagnosis Date    Bladder tumor     Hypertension     Vitamin B deficiency     Vitamin D deficiency       Past Surgical History:   Procedure Laterality Date    ABDOMINAL EXPLORATION SURGERY      to remove a mass    BLADDER SURGERY N/A 8/22/2024    TRANSURETHRAL RESECTION OF BLADDER TUMOR performed by Trace Hernandez MD at [facility] OR    CATARACT EXTRACTION, BILATERAL      FOOT SURGERY Right     removed bone from  Reactions    Latex Rash        Review of Systems: A complete review of systems was obtained, negative except as described above.    Physical Exam:   BP (!) 129/53   Pulse 68   Temp 98.3 °F (36.8 °C) (Oral)   Resp 18   Ht 1.524 m (5')   Wt 73.3 kg (161 lb 8 oz)   SpO2 98%   BMI 31.54 kg/m²     ECOG PS: 3-4  General: chronically ill appearing   Eyes: sclera anicteric  HENT: Atraumatic  Neck: Supple  Respiratory: normal respiratory effort  MS: generalized weakness, s/p fall   Skin: warm, dry  Psych: confused, sitter was at bedside     Results:     Lab Results   Component Value Date/Time    WBC 11.7 10/17/2024 12:23 AM    HGB 9.5 10/17/2024 12:23 AM    HCT 32.0 10/17/2024 12:23 AM     10/17/2024 12:23 AM    MCV 84.2 10/17/2024 12:23 AM     Lab Results   Component Value Date/Time     10/15/2024 07:03 AM    K 3.4 10/15/2024 07:03 AM     10/15/2024 07:03 AM    CO2 28 10/15/2024 07:03 AM    BUN 5 10/15/2024 07:03 AM     Lab Results   Component Value Date/Time    ALT 41 10/11/2024 05:01 PM    GLOB 3.1 10/11/2024 05:01 PM         No valid procedures specified.    === 10/11/24 ===    XR SACRUM COCCYX (MIN 2 VIEWS)    - Narrative -  PROCEDURE: XR SACRUM COCCYX (MIN 2 VIEWS)    COMPARISON: None    REASON FOR STUDY: trauma    FINDINGS: 3 views of the sacrum and coccyx were obtained. There is some  irregularity of the coccyx which appears to be chronic. No definite acute  fractures are identified.    - Impression -  Irregularity of the coccyx which may be chronic. Correlate for point  tenderness. Otherwise no acute fractures are identified            Electronically signed by Gm Champagne    === 10/11/24 ===    CT HEAD WO CONTRAST    - Narrative -  EXAM: CT HEAD WO CONTRAST    INDICATION: Trauma. History of bladder cancer. Patient fell, striking the back  of her head.    COMPARISON: 8/16/2024.    CONTRAST: None.    TECHNIQUE: Unenhanced CT of the head was performed using 5 mm images. Brain and  bone

## 2024-10-17 NOTE — PROGRESS NOTES
Palliative Medicine  Patient Name: Ronna Arzola  YOB: 1935  MRN: [id number]  Age: 89 y.o.  Gender: female    Date of Initial Consult: 10/14/2024  Date of Service: 10/17/2024  Time: 2:31 PM  Provider: Kristina Crane MD  Hospital Day: 7  Admit Date: 10/11/2024  Referring Provider: Soco Rivers MD      Reasons for Consultation:  Goals of Care    HISTORY OF PRESENT ILLNESS (HPI):   Ronna Arzola is a 89 y.o. female with a past medical history of urothelial carcinoma of bladder recently dx 8/2024, hypertension, insomnia, chronic constipation, who was admitted on 10/11/2024 from LTC facility ([facility]) with complaints of frequent falls weakness, fatigue and malaise .     Psychosocial:  after 60 years of marriage, has two adult sons (Parker and Jesu), and 6-7 grand children. Parker's wife Veronica also very involved. 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      PALLIATIVE DIAGNOSES:    Bladder Cancer  Delirium  Physical debility  Goals of care      ASSESSMENT AND PLAN:   Our team met w/ pt and family earlier this week.   Meet w/ today as pt not able to get simulation for radiation yest due to restless legs, but pre-medicated today and about to go down.  Son Jan at bedside- confirms plan that once radiation completed in order to help slow/stop bleeding then plan is to go back to the [facility] LTC with Hospice as no chemo planned.   Sx currently managed.         Initial consult:   Understanding of Illness/Discussion:   We discussed the patient's condition in detail. Family members verbalized a clear understanding of hospital events including issues leading to admission.   Family reported that they met with rad-onc provider, and want to proceed with palliative radiation to stop the bleeding.  Family members reported that they are aware that patient is not a candidate for surgery, and they don't want her to go through chemotherapy.  Family have no concerns of her

## 2024-10-17 NOTE — PLAN OF CARE
[facility] TREATMENT    Patient: Ronna Arzola (89 y.o. female)  Date: 10/17/2024  Primary Diagnosis: Contusion of scalp, initial encounter [S00.03XA]  Contusion of sacrum, initial encounter [S30.0XXA]  Symptomatic anemia [D64.9]  Urinary tract infection with hematuria, site unspecified [N39.0, R31.9]  Anemia, unspecified type [D64.9]  Acute blood loss anemia [D62]       Precautions:  Fall Risk                  ASSESSMENT :    Patient participated with PO trials regular solids; hard crackers, raw apple. Patient presents with prolonged, however complete mastication regular textured solid trials with no overt s/sx aspiration or penetration observed. Thin liquids also with no s/sx aspiration or penetration. Patient appears safe to advance to regular solids at this time with close monitoring for tolerance. Given previous c/o globus sensation, patient may benefit from GI consult to assess for reflux/GERD and dependent on goals of care.    Patient will benefit from skilled intervention to address the above impairments.     PLAN :  Recommendations and Planned Interventions:  Diet: Regular, thin liquids  Straws OK  Must be upright all meals     Acute SLP Services: Yes, SLP will continue to follow per plan of care.  Discharge Recommendations: Continue to assess pending progress     SUBJECTIVE:   Patient asleep upon entry into room, able to wake and was repositioned upright for safety with PO intake. Family in room and deny any Hx of dysphagia.    OBJECTIVE:     Past Medical History:   Diagnosis Date    Bladder tumor     Hypertension     Vitamin B deficiency     Vitamin D deficiency      Past Surgical History:   Procedure Laterality Date    ABDOMINAL EXPLORATION SURGERY      to remove a mass    BLADDER SURGERY N/A 8/22/2024    TRANSURETHRAL RESECTION OF BLADDER TUMOR performed by Trace Hernandez MD at [facility] MAIN OR    CATARACT EXTRACTION, BILATERAL      FOOT SURGERY Right     removed bone from right toe

## 2024-10-17 NOTE — PROGRESS NOTES
[facility]                                                                                          Hospitalist Progress Note  JUNG Caldwell NP  Office Phone: [phone]        Date of Service:  10/17/2024  NAME:  Ronna Arzola  :  1935  MRN:  [id number]       Admission Summary:   Ronna Arzola is a 89 y.o. female with history of urothelial carcinoma of bladder, hypertension, insomnia, chronic constipation who presented to the ER from nursing facility for evaluation of falls weakness, fatigue and malaise.  She reports near 1 week of severe weakness as well as symptoms into falls.  Sustained a head injury last week.  Today she lost her footing and fell backward and landed on her buttock.  Noted some pain in his sacrum after her fall.  No reported head injury with this fall.  When told about her anemia she does admit to intermittent episodes of occasional dark stools and occasionally bright red blood in her stools.  The patient denies any fever, chills, chest or abdominal pain, nausea, vomiting, cough, congestion, recent illness, palpitations, or dysuria.       Interval history / Subjective:     Patient complaining of increased neuropathy pain to B/L LE. Difficult night last night, hematuria which prompted additional workup including bleeding scan. Did not get much sleep. Will restart gabapentin at lower dose, 100 mg BID.  Did get first of 10 XRT today.     Assessment & Plan:        Acute blood loss anemia  Possibly 2/2 known bladder mass   - hemoglobin on admission, 5.8   - s/p 2U pRBC --> hgb 7.6   - FOBT negative   - GI consulted, no plans for GI work-up at this time   - Hold home asa   - Patient does have large bladder mass which may be source of bleeding, see below     Bladder Cancer  - Follows with [facility]; recent diagnosis about 6 weeks ago   - Likely contributing to anemia   - Was planned for meeting with radiation oncology this  25 mg Rectal BID    sodium chloride flush 0.9 % injection 5-40 mL  5-40 mL IntraVENous 2 times per day    sodium chloride flush 0.9 % injection 5-40 mL  5-40 mL IntraVENous PRN    0.9 % sodium chloride infusion   IntraVENous PRN    potassium chloride (KLOR-CON) extended release tablet 40 mEq  40 mEq Oral PRN    Or    potassium bicarb-citric acid (EFFER-K) effervescent tablet 40 mEq  40 mEq Oral PRN    Or    potassium chloride 10 mEq/100 mL IVPB (Peripheral Line)  10 mEq IntraVENous PRN    magnesium sulfate 2000 mg in 50 mL IVPB premix  2,000 mg IntraVENous PRN    ondansetron (ZOFRAN-ODT) disintegrating tablet 4 mg  4 mg Oral Q8H PRN    Or    ondansetron (ZOFRAN) injection 4 mg  4 mg IntraVENous Q6H PRN    polyethylene glycol (GLYCOLAX) packet 17 g  17 g Oral Daily PRN    acetaminophen (TYLENOL) tablet 650 mg  650 mg Oral Q6H PRN    Or    acetaminophen (TYLENOL) suppository 650 mg  650 mg Rectal Q6H PRN    oxyCODONE (ROXICODONE) immediate release tablet 5 mg  5 mg Oral Q6H PRN    [Held by provider] aspirin EC tablet 81 mg  81 mg Oral Daily    atorvastatin (LIPITOR) tablet 10 mg  10 mg Oral Daily    escitalopram (LEXAPRO) tablet 5 mg  5 mg Oral QAM    [Held by provider] mirtazapine (REMERON) tablet 7.5 mg  7.5 mg Oral Nightly    prazosin (MINIPRESS) capsule 1 mg  1 mg Oral Nightly     ______________________________________________________________________  EXPECTED LENGTH OF STAY: 7  ACTUAL LENGTH OF STAY:          5                 JUNG Caldwell NP

## 2024-10-18 ENCOUNTER — HOSPITAL ENCOUNTER (OUTPATIENT)
Facility: HOSPITAL | Age: 89
Discharge: HOME | End: 2024-10-21
Attending: RADIOLOGY
Payer: MEDICARE

## 2024-10-18 LAB
RAD ONC ARIA COURSE FIRST TREATMENT DATE: NORMAL
RAD ONC ARIA COURSE ID: NORMAL
RAD ONC ARIA COURSE INTENT: NORMAL
RAD ONC ARIA COURSE LAST TREATMENT DATE: NORMAL
RAD ONC ARIA COURSE SESSION NUMBER: 2
RAD ONC ARIA COURSE START DATE: NORMAL
RAD ONC ARIA COURSE TREATMENT ELAPSED DAYS: 1
RAD ONC ARIA PLAN FRACTIONS TREATED TO DATE: 2
RAD ONC ARIA PLAN ID: NORMAL
RAD ONC ARIA PLAN PRESCRIBED DOSE PER FRACTION: 3 GY
RAD ONC ARIA PLAN PRIMARY REFERENCE POINT: NORMAL
RAD ONC ARIA PLAN TOTAL FRACTIONS PRESCRIBED: 10
RAD ONC ARIA PLAN TOTAL PRESCRIBED DOSE: 3000 CGY
RAD ONC ARIA REFERENCE POINT DOSAGE GIVEN TO DATE: 6 GY
RAD ONC ARIA REFERENCE POINT DOSAGE GIVEN TO DATE: 6.19 GY
RAD ONC ARIA REFERENCE POINT ID: NORMAL
RAD ONC ARIA REFERENCE POINT ID: NORMAL
RAD ONC ARIA REFERENCE POINT SESSION DOSAGE GIVEN: 3 GY
RAD ONC ARIA REFERENCE POINT SESSION DOSAGE GIVEN: 3.09 GY

## 2024-10-18 PROCEDURE — 6370000000 HC RX 637 (ALT 250 FOR IP): Performed by: INTERNAL MEDICINE

## 2024-10-18 PROCEDURE — 2580000003 HC RX 258: Performed by: STUDENT IN AN ORGANIZED HEALTH CARE EDUCATION/TRAINING PROGRAM

## 2024-10-18 PROCEDURE — 2580000003 HC RX 258

## 2024-10-18 PROCEDURE — 6370000000 HC RX 637 (ALT 250 FOR IP): Performed by: NURSE PRACTITIONER

## 2024-10-18 PROCEDURE — 92526 ORAL FUNCTION THERAPY: CPT

## 2024-10-18 PROCEDURE — 1100000000 HC RM PRIVATE

## 2024-10-18 PROCEDURE — 6370000000 HC RX 637 (ALT 250 FOR IP): Performed by: STUDENT IN AN ORGANIZED HEALTH CARE EDUCATION/TRAINING PROGRAM

## 2024-10-18 PROCEDURE — 6360000002 HC RX W HCPCS

## 2024-10-18 PROCEDURE — 77014 HC CT TREATMENT PLAN: CPT

## 2024-10-18 PROCEDURE — 77412 RADIATION TX DELIVERY LVL 3: CPT

## 2024-10-18 RX ORDER — PANTOPRAZOLE SODIUM 40 MG/1
40 TABLET, DELAYED RELEASE ORAL
Status: DISCONTINUED | OUTPATIENT
Start: 2024-10-18 | End: 2024-10-23 | Stop reason: HOSPADM

## 2024-10-18 RX ADMIN — OXYCODONE 5 MG: 5 TABLET ORAL at 13:40

## 2024-10-18 RX ADMIN — HYDROCORTISONE ACETATE 25 MG: 25 SUPPOSITORY RECTAL at 09:52

## 2024-10-18 RX ADMIN — SODIUM CHLORIDE, PRESERVATIVE FREE 10 ML: 5 INJECTION INTRAVENOUS at 22:36

## 2024-10-18 RX ADMIN — SODIUM CHLORIDE, PRESERVATIVE FREE 10 ML: 5 INJECTION INTRAVENOUS at 09:53

## 2024-10-18 RX ADMIN — ESCITALOPRAM OXALATE 5 MG: 10 TABLET ORAL at 09:52

## 2024-10-18 RX ADMIN — ATORVASTATIN CALCIUM 10 MG: 10 TABLET, FILM COATED ORAL at 09:52

## 2024-10-18 RX ADMIN — GABAPENTIN 100 MG: 100 CAPSULE ORAL at 09:52

## 2024-10-18 RX ADMIN — GABAPENTIN 100 MG: 100 CAPSULE ORAL at 22:36

## 2024-10-18 RX ADMIN — PRAZOSIN HYDROCHLORIDE 1 MG: 1 CAPSULE ORAL at 22:35

## 2024-10-18 RX ADMIN — PANTOPRAZOLE SODIUM 40 MG: 40 INJECTION, POWDER, FOR SOLUTION INTRAVENOUS at 04:15

## 2024-10-18 RX ADMIN — ACETAMINOPHEN 650 MG: 325 TABLET ORAL at 13:39

## 2024-10-18 NOTE — PROGRESS NOTES
Occupational Therapy: Defer    Chart reviewed up to date. Attempted to see pt for OT treatment session. Pt politely declined stating she wasn't feeling well and just got comfortable. Encouraged OOB mobility for lunch/grooming. Pt declined. Will defer for now and attempt back later as able.     Thank you,  Jeana TY, OTR/L

## 2024-10-18 NOTE — PROGRESS NOTES
Physical Therapy    Attempted to see Ms Arzola for skilled PT services. Pt currently [facility]ESLEY. Will follow up as able and appropriate.    CARIE VALERA, PT

## 2024-10-18 NOTE — PROGRESS NOTES
Clinical Pharmacy Note: IV to PO Automatic Conversion  Please note: Ronna Arzola’s medication(s) (pantoprazole) has/have been changed from IV to PO based on the following critiera:    Patient is tolerating oral medications  Patient is tolerating a diet more advanced than clear liquids  Patient is not requiring vasopressors    This IV to PO conversion is based on the P&T approved automatic conversion policy for eligible patients.  Please call with questions.

## 2024-10-18 NOTE — PLAN OF CARE
most efficient PO diet without clinical sequelae of aspiration. Goal initiated 10/15. MET    Outcome: Completed

## 2024-10-18 NOTE — PROGRESS NOTES
[facility]                                                                                          Hospitalist Progress Note  JUNG Caldwell NP  Office Phone: [phone]        Date of Service:  10/18/2024  NAME:  Ronna Arzola  :  1935  MRN:  [id number]       Admission Summary:   Ronna Arzola is a 89 y.o. female with history of urothelial carcinoma of bladder, hypertension, insomnia, chronic constipation who presented to the ER from nursing facility for evaluation of falls weakness, fatigue and malaise.  She reports near 1 week of severe weakness as well as symptoms into falls.  Sustained a head injury last week.  Today she lost her footing and fell backward and landed on her buttock.  Noted some pain in his sacrum after her fall.  No reported head injury with this fall.  When told about her anemia she does admit to intermittent episodes of occasional dark stools and occasionally bright red blood in her stools.  The patient denies any fever, chills, chest or abdominal pain, nausea, vomiting, cough, congestion, recent illness, palpitations, or dysuria.       Interval history / Subjective:     Patient drowsy today. Likely from Valium and geodon given yesterday. Will attempt XRT today without any valium. Patient will need ten treatments. She was too weak and lethargic to consider going back to [facility] today, however, she could possibly go on Monday after third treatment as long as transportation would be set for her to return daily for the remaining 7 treatments.      Assessment & Plan:        Acute blood loss anemia  Possibly 2/2 known bladder mass   - hemoglobin on admission, 5.8   - s/p 2U pRBC --> hgb 7.6   - FOBT negative   - GI consulted, no plans for GI work-up at this time   - Hold home asa   - Patient does have large bladder mass which may be source of bleeding, see below     Bladder Cancer  - Follows with [facility]; recent diagnosis  (8/16/2024)    Hunger Vital Sign     Worried About Running Out of Food in the Last Year: Never true     Ran Out of Food in the Last Year: Never true   Transportation Needs: No Transportation Needs (8/16/2024)    PRAPARE - Transportation     Lack of Transportation (Medical): No     Lack of Transportation (Non-Medical): No   Physical Activity: Not on file   Stress: Not on file   Social Connections: Not on file   Intimate Partner Violence: Not on file   Depression: Not at risk (8/29/2024)    PHQ-2     PHQ-2 Score: 0   Housing Stability: Low Risk  (8/16/2024)    Housing Stability Vital Sign     Unable to Pay for Housing in the Last Year: No     Number of Times Moved in the Last Year: 1     Homeless in the Last Year: No   Interpersonal Safety: Not At Risk (8/22/2024)    Interpersonal Safety Domain Source: IP Abuse Screening     Physical abuse: Denies     Verbal abuse: Denies     Emotional abuse: Denies     Financial abuse: Denies     Sexual abuse: Denies   Utilities: Not At Risk (8/16/2024)    [facility] Utilities     Threatened with loss of utilities: No       Review of Systems:   Pertinent items are noted in HPI.       Vital Signs:    Last 24hrs VS reviewed since prior progress note. Most recent are:  Vitals:    10/18/24 1523   BP: (!) 125/47   Pulse: 63   Resp: 15   Temp: 97.9 °F (36.6 °C)   SpO2:        No intake or output data in the 24 hours ending 10/18/24 1641         Physical Examination:     I had a face to face encounter with this patient and independently examined them on 10/18/2024 as outlined below:          General : alert x 1, awake, in mild distress, restless  HEENT: PEERL, EOMI, moist mucus membrane  Neck: supple, no JVD, no meningeal signs  Chest: Clear to auscultation bilaterally   CVS: S1 S2 heard, Capillary refill less than 2 seconds  Abd: soft/ non tender, non distended, BS physiological,   Ext: no clubbing, no cyanosis, no edema, brisk 2+ DP pulses  Neuro/Psych: CN 2-12 grossly intact, sensory grossly

## 2024-10-19 LAB
ALBUMIN SERPL-MCNC: 2 G/DL (ref 3.5–5)
ALBUMIN/GLOB SERPL: 0.6 (ref 1.1–2.2)
ALP SERPL-CCNC: 98 U/L (ref 45–117)
ALT SERPL-CCNC: 14 U/L (ref 12–78)
ANION GAP SERPL CALC-SCNC: 3 MMOL/L (ref 2–12)
AST SERPL-CCNC: 12 U/L (ref 15–37)
BILIRUB SERPL-MCNC: 0.6 MG/DL (ref 0.2–1)
BUN SERPL-MCNC: 8 MG/DL (ref 6–20)
BUN/CREAT SERPL: 14 (ref 12–20)
CALCIUM SERPL-MCNC: 8.1 MG/DL (ref 8.5–10.1)
CHLORIDE SERPL-SCNC: 111 MMOL/L (ref 97–108)
CO2 SERPL-SCNC: 27 MMOL/L (ref 21–32)
COMMENT:: NORMAL
CREAT SERPL-MCNC: 0.58 MG/DL (ref 0.55–1.02)
GLOBULIN SER CALC-MCNC: 3.2 G/DL (ref 2–4)
GLUCOSE SERPL-MCNC: 103 MG/DL (ref 65–100)
POTASSIUM SERPL-SCNC: 3.5 MMOL/L (ref 3.5–5.1)
PROT SERPL-MCNC: 5.2 G/DL (ref 6.4–8.2)
SODIUM SERPL-SCNC: 141 MMOL/L (ref 136–145)
SPECIMEN HOLD: NORMAL

## 2024-10-19 PROCEDURE — 6370000000 HC RX 637 (ALT 250 FOR IP): Performed by: STUDENT IN AN ORGANIZED HEALTH CARE EDUCATION/TRAINING PROGRAM

## 2024-10-19 PROCEDURE — 6370000000 HC RX 637 (ALT 250 FOR IP): Performed by: NURSE PRACTITIONER

## 2024-10-19 PROCEDURE — 1100000000 HC RM PRIVATE

## 2024-10-19 PROCEDURE — 2580000003 HC RX 258: Performed by: STUDENT IN AN ORGANIZED HEALTH CARE EDUCATION/TRAINING PROGRAM

## 2024-10-19 PROCEDURE — 80053 COMPREHEN METABOLIC PANEL: CPT

## 2024-10-19 PROCEDURE — 6370000000 HC RX 637 (ALT 250 FOR IP)

## 2024-10-19 RX ADMIN — SODIUM CHLORIDE, PRESERVATIVE FREE 10 ML: 5 INJECTION INTRAVENOUS at 20:51

## 2024-10-19 RX ADMIN — PANTOPRAZOLE SODIUM 40 MG: 40 TABLET, DELAYED RELEASE ORAL at 07:01

## 2024-10-19 RX ADMIN — GABAPENTIN 100 MG: 100 CAPSULE ORAL at 08:30

## 2024-10-19 RX ADMIN — PANTOPRAZOLE SODIUM 40 MG: 40 TABLET, DELAYED RELEASE ORAL at 17:28

## 2024-10-19 RX ADMIN — ESCITALOPRAM OXALATE 5 MG: 10 TABLET ORAL at 08:30

## 2024-10-19 RX ADMIN — GABAPENTIN 100 MG: 100 CAPSULE ORAL at 20:51

## 2024-10-19 RX ADMIN — SODIUM CHLORIDE, PRESERVATIVE FREE 10 ML: 5 INJECTION INTRAVENOUS at 08:30

## 2024-10-19 RX ADMIN — ATORVASTATIN CALCIUM 10 MG: 10 TABLET, FILM COATED ORAL at 08:30

## 2024-10-19 RX ADMIN — PRAZOSIN HYDROCHLORIDE 1 MG: 1 CAPSULE ORAL at 20:51

## 2024-10-19 NOTE — PROGRESS NOTES
[facility]                                                                                          Hospitalist Progress Note  Nickie Olvera, JUNG - CNP  Office Phone: [phone]        Date of Service:  10/19/2024  NAME:  Ronna Arzola  :  1935  MRN:  [id number]       Admission Summary:   Ronna Arzola is a 89 y.o. female with history of urothelial carcinoma of bladder, hypertension, insomnia, chronic constipation who presented to the ER from nursing facility for evaluation of falls weakness, fatigue and malaise.  She reports near 1 week of severe weakness as well as symptoms into falls.  Sustained a head injury last week.  Today she lost her footing and fell backward and landed on her buttock.  Noted some pain in his sacrum after her fall.  No reported head injury with this fall.  When told about her anemia she does admit to intermittent episodes of occasional dark stools and occasionally bright red blood in her stools.  The patient denies any fever, chills, chest or abdominal pain, nausea, vomiting, cough, congestion, recent illness, palpitations, or dysuria.       Interval history / Subjective:   F/u Anemia/Bladder Cancer/Metabolic Encephalopathy  S/p 2 units PRBC since admission, since starting XRT, hematuria improving.   On 10/18 pt  drowsy suspected 2/2  Valium and geodon given 10/17  Attempted  XRT Bladder 10/18 without any valium. Patient will need ten treatments.  Per pt's family at bedside reports did well 10/18 with treatment, that she did require Oxy/Tylenol.   Family voices concerns as pt has been  sleeping a lot, better today, increasingly getting weaker & they do not feel she is safe to go back to assisted, PT/OT pending as 10/18 off floor for PT, Declined OT, as she wasn't feeling well.  Nurse reports pt planning to transition to Hospice after completing XRT treatments.   Pt states \" I'm doing much better that I was\"  Denies fever, chills,

## 2024-10-20 LAB
ANION GAP SERPL CALC-SCNC: 4 MMOL/L (ref 2–12)
BASOPHILS # BLD: 0 K/UL (ref 0–0.1)
BASOPHILS NFR BLD: 0 % (ref 0–1)
BUN SERPL-MCNC: 6 MG/DL (ref 6–20)
BUN/CREAT SERPL: 11 (ref 12–20)
CALCIUM SERPL-MCNC: 8 MG/DL (ref 8.5–10.1)
CHLORIDE SERPL-SCNC: 111 MMOL/L (ref 97–108)
CO2 SERPL-SCNC: 27 MMOL/L (ref 21–32)
CREAT SERPL-MCNC: 0.54 MG/DL (ref 0.55–1.02)
DIFFERENTIAL METHOD BLD: ABNORMAL
EOSINOPHIL # BLD: 0.4 K/UL (ref 0–0.4)
EOSINOPHIL NFR BLD: 5 % (ref 0–7)
ERYTHROCYTE [DISTWIDTH] IN BLOOD BY AUTOMATED COUNT: 17.4 % (ref 11.5–14.5)
GLUCOSE SERPL-MCNC: 97 MG/DL (ref 65–100)
HCT VFR BLD AUTO: 28 % (ref 35–47)
HGB BLD-MCNC: 8.4 G/DL (ref 11.5–16)
IMM GRANULOCYTES # BLD AUTO: 0 K/UL (ref 0–0.04)
IMM GRANULOCYTES NFR BLD AUTO: 0 % (ref 0–0.5)
LYMPHOCYTES # BLD: 1 K/UL (ref 0.8–3.5)
LYMPHOCYTES NFR BLD: 12 % (ref 12–49)
MCH RBC QN AUTO: 24.6 PG (ref 26–34)
MCHC RBC AUTO-ENTMCNC: 30 G/DL (ref 30–36.5)
MCV RBC AUTO: 82.1 FL (ref 80–99)
MONOCYTES # BLD: 0.9 K/UL (ref 0–1)
MONOCYTES NFR BLD: 11 % (ref 5–13)
NEUTS SEG # BLD: 5.9 K/UL (ref 1.8–8)
NEUTS SEG NFR BLD: 72 % (ref 32–75)
NRBC # BLD: 0 K/UL (ref 0–0.01)
NRBC BLD-RTO: 0 PER 100 WBC
PLATELET # BLD AUTO: 256 K/UL (ref 150–400)
PMV BLD AUTO: 9.6 FL (ref 8.9–12.9)
POTASSIUM SERPL-SCNC: 3.2 MMOL/L (ref 3.5–5.1)
RBC # BLD AUTO: 3.41 M/UL (ref 3.8–5.2)
SODIUM SERPL-SCNC: 142 MMOL/L (ref 136–145)
WBC # BLD AUTO: 8.3 K/UL (ref 3.6–11)

## 2024-10-20 PROCEDURE — 1100000000 HC RM PRIVATE

## 2024-10-20 PROCEDURE — 6370000000 HC RX 637 (ALT 250 FOR IP)

## 2024-10-20 PROCEDURE — 85025 COMPLETE CBC W/AUTO DIFF WBC: CPT

## 2024-10-20 PROCEDURE — 80048 BASIC METABOLIC PNL TOTAL CA: CPT

## 2024-10-20 PROCEDURE — 2580000003 HC RX 258: Performed by: STUDENT IN AN ORGANIZED HEALTH CARE EDUCATION/TRAINING PROGRAM

## 2024-10-20 PROCEDURE — 6370000000 HC RX 637 (ALT 250 FOR IP): Performed by: STUDENT IN AN ORGANIZED HEALTH CARE EDUCATION/TRAINING PROGRAM

## 2024-10-20 PROCEDURE — 6370000000 HC RX 637 (ALT 250 FOR IP): Performed by: NURSE PRACTITIONER

## 2024-10-20 RX ADMIN — GABAPENTIN 100 MG: 100 CAPSULE ORAL at 21:07

## 2024-10-20 RX ADMIN — SODIUM CHLORIDE, PRESERVATIVE FREE 10 ML: 5 INJECTION INTRAVENOUS at 21:08

## 2024-10-20 RX ADMIN — ATORVASTATIN CALCIUM 10 MG: 10 TABLET, FILM COATED ORAL at 08:35

## 2024-10-20 RX ADMIN — POTASSIUM BICARBONATE 40 MEQ: 782 TABLET, EFFERVESCENT ORAL at 10:50

## 2024-10-20 RX ADMIN — PANTOPRAZOLE SODIUM 40 MG: 40 TABLET, DELAYED RELEASE ORAL at 07:16

## 2024-10-20 RX ADMIN — ESCITALOPRAM OXALATE 5 MG: 10 TABLET ORAL at 08:35

## 2024-10-20 RX ADMIN — OXYCODONE 5 MG: 5 TABLET ORAL at 10:54

## 2024-10-20 RX ADMIN — PRAZOSIN HYDROCHLORIDE 1 MG: 1 CAPSULE ORAL at 21:07

## 2024-10-20 RX ADMIN — GABAPENTIN 100 MG: 100 CAPSULE ORAL at 08:35

## 2024-10-20 RX ADMIN — SODIUM CHLORIDE, PRESERVATIVE FREE 10 ML: 5 INJECTION INTRAVENOUS at 08:36

## 2024-10-20 RX ADMIN — POTASSIUM BICARBONATE 40 MEQ: 782 TABLET, EFFERVESCENT ORAL at 21:06

## 2024-10-20 ASSESSMENT — PAIN DESCRIPTION - DESCRIPTORS: DESCRIPTORS: ACHING

## 2024-10-20 ASSESSMENT — PAIN DESCRIPTION - LOCATION: LOCATION: LEG

## 2024-10-20 ASSESSMENT — PAIN SCALES - GENERAL
PAINLEVEL_OUTOF10: 2
PAINLEVEL_OUTOF10: 5

## 2024-10-20 ASSESSMENT — PAIN DESCRIPTION - ORIENTATION: ORIENTATION: LEFT;RIGHT

## 2024-10-20 NOTE — PROGRESS NOTES
Physical Therapy 10/20/2024         Chart reviewed. Patient on PT caseload - will assess tomorrow for discharge recs.   Will continue to follow    Jose Ramon Cardoza, PT

## 2024-10-20 NOTE — CARE COORDINATION
CM outreached patient's son- Parker Arzola PH# [phone]. Him and his wife are locally staying with friends to support patient. They primarily live in [place].    Patient's son states that he is unsure if his mother is safe to discharge back to her [facility]. He would prefer PT/OT re-evaluate her and provide updated recommendations- as previous recs state return to [facility] with HH.    Son states patient has been to [facility] previously and would prefer to return there if SNF needed. [facility]    Son places significant stressor on transport to and from radiation treatments- daily for 10 days. Son states he believes patient will need medical transport to and from treatments. CM explained that transport may not be covered to daily radiation treatments at [facility] nor SNF.  Patient's son to investigate options after PT eval tomorrow.      Patient's son, Mr Arzola, states he has no additional questions/concerns at this time. He states he would like to see how his mother (patient) does with therapy tomorrow and decide DC plan at that time.    [facility] with HH versus SNF- referral pending with [facility].      Kerline Salazar RN-MSN  Care Management

## 2024-10-20 NOTE — PROGRESS NOTES
(36.8 °C)   SpO2: 96%       No intake or output data in the 24 hours ending 10/20/24 1636         Physical Examination:     I had a face to face encounter with this patient and independently examined them on 10/20/2024 as outlined below:          General : alert x 3, states full name, , location, date. Lying in bed on left side, talking with family, no acute distress   HEENT: PEERL, EOMI, moist mucus membrane, glassess   Neck: supple, no JVD.  Chest: Clear to auscultation bilaterally, resp even and nonlabored.   CVS: Apical regular, no murmur appreciated.   Abd: soft/ non tender, non distended, BS physiological  Ext: moving all ext, generalized weakness,  no edema,  2+ DP pulses  Neuro/Psych: No focal deficits, , sensory grossly within normal limit,moves all extremities   Skin: warm /dry      Data Review:    Review and/or order of clinical lab test  Review and/or order of tests in the radiology section of CPT  Review and/or order of tests in the medicine section of CPT      I have personally and independently reviewed all pertinent labs, diagnostic studies, imaging, and have provided independent interpretation of the same.     Labs:     Recent Labs     10/20/24  0653   WBC 8.3   HGB 8.4*   HCT 28.0*        Recent Labs     10/19/24  0807 10/20/24  0653    142   K 3.5 3.2*   * 111*   CO2 27 27   BUN 8 6       Recent Labs     10/19/24  0807   ALT 14   GLOB 3.2       No results for input(s): \"INR\", \"APTT\" in the last 72 hours.    Invalid input(s): \"PTP\"       Notes reviewed from all clinical/nonclinical/nursing services involved in patient's clinical care. Care coordination discussions were held with appropriate clinical/nonclinical/ nursing providers based on care coordination needs.     Patients current active Medications were reviewed, considered, added and adjusted based on the clinical condition today.      Home Medications were reconciled to the best of my ability given all available  resources at the time of admission. Route is PO if not otherwise noted.    Medications Reviewed:     Current Facility-Administered Medications   Medication Dose Route Frequency    potassium bicarb-citric acid (EFFER-K) effervescent tablet 40 mEq  40 mEq Oral BID    pantoprazole (PROTONIX) tablet 40 mg  40 mg Oral BID AC    gabapentin (NEURONTIN) capsule 100 mg  100 mg Oral BID    sodium chloride flush 0.9 % injection 5-40 mL  5-40 mL IntraVENous 2 times per day    sodium chloride flush 0.9 % injection 5-40 mL  5-40 mL IntraVENous PRN    0.9 % sodium chloride infusion   IntraVENous PRN    potassium chloride (KLOR-CON) extended release tablet 40 mEq  40 mEq Oral PRN    Or    potassium bicarb-citric acid (EFFER-K) effervescent tablet 40 mEq  40 mEq Oral PRN    Or    potassium chloride 10 mEq/100 mL IVPB (Peripheral Line)  10 mEq IntraVENous PRN    magnesium sulfate 2000 mg in 50 mL IVPB premix  2,000 mg IntraVENous PRN    ondansetron (ZOFRAN-ODT) disintegrating tablet 4 mg  4 mg Oral Q8H PRN    Or    ondansetron (ZOFRAN) injection 4 mg  4 mg IntraVENous Q6H PRN    polyethylene glycol (GLYCOLAX) packet 17 g  17 g Oral Daily PRN    acetaminophen (TYLENOL) tablet 650 mg  650 mg Oral Q6H PRN    Or    acetaminophen (TYLENOL) suppository 650 mg  650 mg Rectal Q6H PRN    oxyCODONE (ROXICODONE) immediate release tablet 5 mg  5 mg Oral Q6H PRN    [Held by provider] aspirin EC tablet 81 mg  81 mg Oral Daily    atorvastatin (LIPITOR) tablet 10 mg  10 mg Oral Daily    escitalopram (LEXAPRO) tablet 5 mg  5 mg Oral QAM    [Held by provider] mirtazapine (REMERON) tablet 7.5 mg  7.5 mg Oral Nightly    prazosin (MINIPRESS) capsule 1 mg  1 mg Oral Nightly     ______________________________________________________________________  EXPECTED LENGTH OF STAY: 7  ACTUAL LENGTH OF STAY:          8                 Lelia Byrne PA-C

## 2024-10-21 ENCOUNTER — HOSPITAL ENCOUNTER (OUTPATIENT)
Facility: HOSPITAL | Age: 89
Discharge: HOME | End: 2024-10-24
Attending: RADIOLOGY
Payer: MEDICARE

## 2024-10-21 PROBLEM — E43 SEVERE PROTEIN-CALORIE MALNUTRITION (HCC): Status: ACTIVE | Noted: 2024-10-21

## 2024-10-21 LAB
ANION GAP SERPL CALC-SCNC: 1 MMOL/L (ref 2–12)
BASOPHILS # BLD: 0 K/UL (ref 0–0.1)
BASOPHILS NFR BLD: 0 % (ref 0–1)
BUN SERPL-MCNC: 7 MG/DL (ref 6–20)
BUN/CREAT SERPL: 14 (ref 12–20)
CALCIUM SERPL-MCNC: 8.4 MG/DL (ref 8.5–10.1)
CHLORIDE SERPL-SCNC: 109 MMOL/L (ref 97–108)
CO2 SERPL-SCNC: 31 MMOL/L (ref 21–32)
CREAT SERPL-MCNC: 0.51 MG/DL (ref 0.55–1.02)
DIFFERENTIAL METHOD BLD: ABNORMAL
EOSINOPHIL # BLD: 0.3 K/UL (ref 0–0.4)
EOSINOPHIL NFR BLD: 3 % (ref 0–7)
ERYTHROCYTE [DISTWIDTH] IN BLOOD BY AUTOMATED COUNT: 17.4 % (ref 11.5–14.5)
GLUCOSE SERPL-MCNC: 116 MG/DL (ref 65–100)
HCT VFR BLD AUTO: 27.7 % (ref 35–47)
HGB BLD-MCNC: 8.3 G/DL (ref 11.5–16)
IMM GRANULOCYTES # BLD AUTO: 0 K/UL (ref 0–0.04)
IMM GRANULOCYTES NFR BLD AUTO: 0 % (ref 0–0.5)
LYMPHOCYTES # BLD: 0.9 K/UL (ref 0.8–3.5)
LYMPHOCYTES NFR BLD: 10 % (ref 12–49)
MCH RBC QN AUTO: 24.4 PG (ref 26–34)
MCHC RBC AUTO-ENTMCNC: 30 G/DL (ref 30–36.5)
MCV RBC AUTO: 81.5 FL (ref 80–99)
MONOCYTES # BLD: 0.9 K/UL (ref 0–1)
MONOCYTES NFR BLD: 11 % (ref 5–13)
NEUTS SEG # BLD: 6.5 K/UL (ref 1.8–8)
NEUTS SEG NFR BLD: 76 % (ref 32–75)
NRBC # BLD: 0 K/UL (ref 0–0.01)
NRBC BLD-RTO: 0 PER 100 WBC
PLATELET # BLD AUTO: 249 K/UL (ref 150–400)
PMV BLD AUTO: 9.5 FL (ref 8.9–12.9)
POTASSIUM SERPL-SCNC: 4.2 MMOL/L (ref 3.5–5.1)
RAD ONC ARIA COURSE FIRST TREATMENT DATE: NORMAL
RAD ONC ARIA COURSE ID: NORMAL
RAD ONC ARIA COURSE INTENT: NORMAL
RAD ONC ARIA COURSE LAST TREATMENT DATE: NORMAL
RAD ONC ARIA COURSE SESSION NUMBER: 3
RAD ONC ARIA COURSE START DATE: NORMAL
RAD ONC ARIA COURSE TREATMENT ELAPSED DAYS: 4
RAD ONC ARIA PLAN FRACTIONS TREATED TO DATE: 3
RAD ONC ARIA PLAN ID: NORMAL
RAD ONC ARIA PLAN PRESCRIBED DOSE PER FRACTION: 3 GY
RAD ONC ARIA PLAN PRIMARY REFERENCE POINT: NORMAL
RAD ONC ARIA PLAN TOTAL FRACTIONS PRESCRIBED: 10
RAD ONC ARIA PLAN TOTAL PRESCRIBED DOSE: 3000 CGY
RAD ONC ARIA REFERENCE POINT DOSAGE GIVEN TO DATE: 9 GY
RAD ONC ARIA REFERENCE POINT DOSAGE GIVEN TO DATE: 9.28 GY
RAD ONC ARIA REFERENCE POINT ID: NORMAL
RAD ONC ARIA REFERENCE POINT ID: NORMAL
RAD ONC ARIA REFERENCE POINT SESSION DOSAGE GIVEN: 3 GY
RAD ONC ARIA REFERENCE POINT SESSION DOSAGE GIVEN: 3.09 GY
RBC # BLD AUTO: 3.4 M/UL (ref 3.8–5.2)
SODIUM SERPL-SCNC: 141 MMOL/L (ref 136–145)
WBC # BLD AUTO: 8.7 K/UL (ref 3.6–11)

## 2024-10-21 PROCEDURE — 97535 SELF CARE MNGMENT TRAINING: CPT

## 2024-10-21 PROCEDURE — 77412 RADIATION TX DELIVERY LVL 3: CPT

## 2024-10-21 PROCEDURE — 6370000000 HC RX 637 (ALT 250 FOR IP)

## 2024-10-21 PROCEDURE — 1100000000 HC RM PRIVATE

## 2024-10-21 PROCEDURE — 6370000000 HC RX 637 (ALT 250 FOR IP): Performed by: STUDENT IN AN ORGANIZED HEALTH CARE EDUCATION/TRAINING PROGRAM

## 2024-10-21 PROCEDURE — 80048 BASIC METABOLIC PNL TOTAL CA: CPT

## 2024-10-21 PROCEDURE — 6370000000 HC RX 637 (ALT 250 FOR IP): Performed by: NURSE PRACTITIONER

## 2024-10-21 PROCEDURE — 77014 HC CT TREATMENT PLAN: CPT

## 2024-10-21 PROCEDURE — 85025 COMPLETE CBC W/AUTO DIFF WBC: CPT

## 2024-10-21 PROCEDURE — 97116 GAIT TRAINING THERAPY: CPT

## 2024-10-21 PROCEDURE — 36415 COLL VENOUS BLD VENIPUNCTURE: CPT

## 2024-10-21 RX ADMIN — ESCITALOPRAM OXALATE 5 MG: 10 TABLET ORAL at 08:58

## 2024-10-21 RX ADMIN — PRAZOSIN HYDROCHLORIDE 1 MG: 1 CAPSULE ORAL at 20:37

## 2024-10-21 RX ADMIN — GABAPENTIN 100 MG: 100 CAPSULE ORAL at 08:58

## 2024-10-21 RX ADMIN — ATORVASTATIN CALCIUM 10 MG: 10 TABLET, FILM COATED ORAL at 08:57

## 2024-10-21 RX ADMIN — GABAPENTIN 100 MG: 100 CAPSULE ORAL at 20:38

## 2024-10-21 RX ADMIN — POLYETHYLENE GLYCOL 3350 17 G: 17 POWDER, FOR SOLUTION ORAL at 20:43

## 2024-10-21 RX ADMIN — PANTOPRAZOLE SODIUM 40 MG: 40 TABLET, DELAYED RELEASE ORAL at 06:13

## 2024-10-21 RX ADMIN — OXYCODONE 5 MG: 5 TABLET ORAL at 14:46

## 2024-10-21 RX ADMIN — PANTOPRAZOLE SODIUM 40 MG: 40 TABLET, DELAYED RELEASE ORAL at 16:45

## 2024-10-21 NOTE — PROGRESS NOTES
[facility]                                                                                          Hospitalist Progress Note  Lelia Byrne PA-C  Office Phone: [phone]        Date of Service:  10/21/2024  NAME:  Ronna Arzola  :  1935  MRN:  [id number]       Admission Summary:   Ronna Arzola is a 89 y.o. female with history of urothelial carcinoma of bladder, hypertension, insomnia, chronic constipation who presented to the ER from nursing facility for evaluation of falls weakness, fatigue and malaise.  She reports near 1 week of severe weakness as well as symptoms into falls.  Sustained a head injury last week.  Today she lost her footing and fell backward and landed on her buttock.  Noted some pain in his sacrum after her fall.  No reported head injury with this fall.  When told about her anemia she does admit to intermittent episodes of occasional dark stools and occasionally bright red blood in her stools.  The patient denies any fever, chills, chest or abdominal pain, nausea, vomiting, cough, congestion, recent illness, palpitations, or dysuria.       Interval history / Subjective:     F/u ABLA, bladder cancer  S/p 2 units PRBC since admission, since starting XRT, hematuria improving.   Patient seen and examined this afternoon after returning from radiation therapy.  Sleepy, but easily roused, laying in bed comfortably.   Denies cp/sob, abd pain, n/v/diarrhea.  PT/OT recommending SNF. Will be difficult placement as these facilities would have to transport her to daily radiation therapy for 7 more treatments. Additionally, family may be responsible for the daily transport. Discussed with CM       Assessment & Plan:        Acute blood loss anemia s/p 2 units PRBC   Possibly 2/2 known bladder mass   - hemoglobin on admission, 5.8 ,  s/p 2U pRBC  - hgb 8.3 today  - FOBT negative   - GI consulted, no plans for GI work-up at this time   - Hold home asa

## 2024-10-21 NOTE — PLAN OF CARE
Vitamin D deficiency      Past Surgical History:   Procedure Laterality Date    ABDOMINAL EXPLORATION SURGERY      to remove a mass    BLADDER SURGERY N/A 8/22/2024    TRANSURETHRAL RESECTION OF BLADDER TUMOR performed by Trace Hernandez MD at [facility] MAIN OR    CATARACT EXTRACTION, BILATERAL      FOOT SURGERY Right     removed bone from right toe    HYSTERECTOMY (CERVIX STATUS UNKNOWN)      KELOID EXCISION Left     ear    KNEE ARTHROPLASTY Bilateral     TONSILLECTOMY         Home Situation:  Social/Functional History  Lives With: Alone  Type of Home: Apartment  Home Access: Elevator  Bathroom Shower/Tub: Walk-in shower  Bathroom Equipment: Grab bars in shower, Shower chair  Home Equipment: Cane, Rollator  Receives Help From: Other (comment) ([facility])  Critical Behavior:          Hearing:   Hearing  Hearing: Within functional limits    Vision/Perceptual:                  Strength:         Tone & Sensation:           Range Of Motion:          Functional Mobility:  Bed Mobility:     Bed Mobility Training  Sit to Supine: Stand-by assistance  Scooting: Stand-by assistance  Transfers:     Transfer Training  Stand to Sit: Stand-by assistance  Stand Pivot Transfers: Stand-by assistance  Bed to Chair: Stand-by assistance  Balance:               Balance  Sitting - Static: Good (unsupported)  Sitting - Dynamic: Good (unsupported)  Standing: Impaired;With support  Standing - Static: Good  Standing - Dynamic: Good  Ambulation/Gait Training:                       Gait  Overall Level of Assistance: Stand-by assistance  Distance (ft): 120 Feet  Assistive Device: Gait belt;Walker, rolling  Interventions: Safety awareness training;Verbal cues  Base of Support: Widened  Speed/Lisa: Slow  Step Length: Right shortened;Left shortened  Gait Abnormalities: Decreased step clearance              Activity Tolerance:   Fair     After treatment:   Patient left in no apparent distress in bed, Call bell within reach, Bed/ chair alarm activated,

## 2024-10-21 NOTE — PROGRESS NOTES
SLP Contact Note    Reconsult received and discussed with ANA LUISA Rowell. Patient is known to our department and we just recently signed off. Reportedly was concern for choking on mitchell. Patient with likely esophageal deficits and thus more likely that choking episode was an esophageal response from a hard, dry mitchell.  Recommend pt continue diet with strict precautions as outlined below. No further acute SLP needs.      Recommendations:  -Oral medications whole in purees  -Aspiration and esophageal precautions:     · Small and single bites and sips    · Slow rate of intake    · Alternate bites and sips    · Sit fully upright for meals, preferably OOB in chair     · Remain in upright position for at least one hour after meals    · Consider smaller, more frequent meals throughout the day instead of three large meals    Erin Valenzuela M.Ed, PhD(c), CCC-SLP (pronouns: she/her/hers)  Speech-Language Pathologist

## 2024-10-21 NOTE — PLAN OF CARE
Problem: Occupational Therapy - Adult  Goal: By Discharge: Performs self-care activities at highest level of function for planned discharge setting.  See evaluation for individualized goals.  Description: FUNCTIONAL STATUS PRIOR TO ADMISSION:  Pt unable to provide PLOF due to cognitive status. Pt admitted from SNF. Per chart, pt was mod I with cane prior to recent hospitalization/SNF.     HOME SUPPORT PRIOR TO ADMISSION: The patient lived with son.       Occupational Therapy Goals:  Initiated 10/12/2024; Continue:10/21/24  1.  Patient will perform grooming with Stand by Assist within 7 day(s)./  2.  Patient will perform upper body dressing with Stand by Assist within 7 day(s).  3.  Patient will perform lower body dressing with Moderate Assist within 7 day(s).  4.  Patient will perform toilet transfers with Moderate Assist and Assist x1  within 7 day(s).  5.  Patient will perform all aspects of toileting with Moderate Assist within 7 day(s).  6.  Patient will participate in upper extremity therapeutic exercise/activities with Stand by Assist within 7 day(s).    7.  Patient will utilize energy conservation techniques during functional activities with verbal cues within 7 day(s).    Outcome: Progressing   OCCUPATIONAL THERAPY TREATMENT: WEEKLY REASSESSMENT    Patient: Ronna Arzola (89 y.o. female)  Date: 10/21/2024  Primary Diagnosis: Contusion of scalp, initial encounter [S00.03XA]  Contusion of sacrum, initial encounter [S30.0XXA]  Symptomatic anemia [D64.9]  Urinary tract infection with hematuria, site unspecified [N39.0, R31.9]  Anemia, unspecified type [D64.9]  Acute blood loss anemia [D62]       Precautions: Fall Risk                Chart, occupational therapy assessment, plan of care, and goals were reviewed.    ASSESSMENT  Patient continues to benefit from skilled OT services and is progressing towards goals. Pt received sitting EOB finishing breakfast, agreeable to therapy, cleared by RN. Pt was SBA for  functional transfers and CGA for functional mobility to the bathroom to complete toileting. Pt was CGA for brief management. Pt was able to complete standing grooming at sink with CGA. Pt was able to functionally mobilize further, but endorsed fatiguing quickly, vitals stable. Pt left semi-reclined in bed as she could not tolerate pressure on buttocks in chair. Will continue to follow in acute setting.             PLAN  Goals have been updated based on progression since last assessment.  Patient continues to benefit from skilled intervention to address the above impairments.    Recommendations and Planned Interventions:   self care training, therapeutic activities, functional mobility training, balance training, therapeutic exercise, endurance activities, patient education, home safety training, and family training/education    Frequency/Duration: OT Plan of Care: 5 times/week    Recommend with staff: oob to chair for all meals    Recommend next OT session: standing grooming    Recommendation for discharge: (in order for the patient to meet his/her long term goals):   Moderate intensity short-term skilled occupational therapy up to 5x/week    Other factors to consider for discharge: patient's current support system is unable to meet their requirements for physical assistance, poor safety awareness, high risk for falls, not safe to be alone, and concern for safely navigating or managing the home environment    IF patient discharges home will need the following DME: continuing to assess with progress     SUBJECTIVE:   Patient stated “I just dont feel good.”    OBJECTIVE DATA SUMMARY:   Cognitive/Behavioral Status:          Functional Mobility and Transfers for ADLs:  Bed Mobility:  Bed Mobility Training  Bed Mobility Training: Yes  Supine to Sit: Supervision  Sit to Supine: Supervision  Scooting: Supervision     Transfers:   Transfer Training  Transfer Training: Yes  Overall Level of Assistance: Contact-guard

## 2024-10-21 NOTE — PROGRESS NOTES
trial glucerna strawberry). Meal preferences obtained/recorded, family states that pt is not very picky when it comes to food. Noted incident of pt choking on sausage this AM, SLP following and still recommending current diet. RD provided menu and kitchen phone # for meal selections. Continue regular diet.       Nutritionally Significant Medications:  Lipitor, Protonix, Remeron (held)      Estimated Daily Nutrient Needs:  Energy Requirements Based On: Kcal/kg  Weight Used for Energy Requirements: Adjusted  Energy (kcal/day): 1572 - 1834 kcal/day (30 - 35 kcal/day)  Weight Used for Protein Requirements: Adjusted  Protein (g/day): 79 g/day (1.5 g/kg)  Method Used for Fluid Requirements: 1 ml/kcal  Fluid (ml/day): 1572 - 1834 ml/day (1ml/kcal)    Nutrition Related Findings:   Edema: None                    Recent Labs     10/19/24  0807 10/20/24  0653 10/21/24  0818   GLUCOSE 103* 97 116*   BUN 8 6 7   CREATININE 0.58 0.54* 0.51*    142 141   K 3.5 3.2* 4.2   * 111* 109*   CO2 27 27 31   CALCIUM 8.1* 8.0* 8.4*         Last BM: 10/18/24    Wounds:   Wound Type: None      Current Nutrition Therapies:  Diet: Regular   Supplements: none  Meal Intake:   No data found.  Supplement Intake:  No data found.  Nutrition Support: none      Anthropometric Measures:  Height: 152.4 cm (5')  Ideal Body Weight (IBW): 100 lbs (45 kg)       Current Body Weight: 73.3 kg (161 lb 9.6 oz), 161.6 % IBW. Weight Source: Bed Scale  Current BMI (kg/m2): 31.6        Weight Adjustment For: No Adjustment                 BMI Categories: Obese Class 1 (BMI 30.0-34.9)    Wt Readings from Last 10 Encounters:   10/12/24 73.3 kg (161 lb 8 oz)   10/03/24 73.8 kg (162 lb 12.8 oz)   09/24/24 72.6 kg (160 lb)   08/16/24 80.6 kg (177 lb 11.1 oz)           Nutrition Diagnosis:   In context of acute illness or injury, Severe malnutrition related to catabolic illness, early satiety as evidenced by poor intake prior to admission, weight loss greater  than or equal to 5% in 1 month, mild muscle loss    Nutrition Interventions:   Food and/or Nutrient Delivery: Continue Current Diet, Start Oral Nutrition Supplement, Snacks (Comment)  Nutrition Education/Counseling: No recommendation at this time  Coordination of Nutrition Care: Continue to monitor while inpatient       Goals:     Goals: PO intake 50% or greater, by next RD assessment       Nutrition Monitoring and Evaluation:   Behavioral-Environmental Outcomes: None Identified  Food/Nutrient Intake Outcomes: Diet Advancement/Tolerance, Food and Nutrient Intake, Supplement Intake  Physical Signs/Symptoms Outcomes: Meal Time Behavior, Weight, Biochemical Data, Nutrition Focused Physical Findings, Constipation    Discharge Planning:    Continue current diet, Continue Oral Nutrition Supplement     Helen Moser RD  Available via [employer]

## 2024-10-21 NOTE — CARE COORDINATION
Transition of Care Plan:    RUR: 22%  Prior Level of Functioning: Mod I with cane at senior living  Disposition[facility] SN[facility]  If SNF or IPR: Date FOC offered: 10/21  Date FOC received: patient may return to senior living  Follow up appointments: pcp/specialist/radiation  DME needed: n/a  Transportation at discharge: family  IM/IMM Medicare/ letter given: will need prior to d/c   Caregiver Contact:   Secondary Decision Maker: Veronica Arzola - Other Relative - [phone]    Secondary Decision Maker: Jess Arzola - Grandchild - [phone]    Discharge Caregiver contacted prior to discharge? Yes at bedside  Care Conference needed? no  Barriers to discharge: none      CM met with patient son and daughter in law at bedside. They are agreeable to SNF however patient may be able to return to [facility] based on therapy notes, patient ambulates 120 feet. Cm faxed clinicals to [facility] at [phone]. Patient completing 3/10 radiation today. Family would like therapy to see if she can shower her self, will relay request.    COLLIN Aguilera  Care Management Department  Ph:[phone]

## 2024-10-22 ENCOUNTER — HOSPITAL ENCOUNTER (OUTPATIENT)
Facility: HOSPITAL | Age: 89
Discharge: HOME | End: 2024-10-25
Attending: RADIOLOGY
Payer: MEDICARE

## 2024-10-22 LAB — MAGNESIUM SERPL-MCNC: 2.1 MG/DL (ref 1.6–2.4)

## 2024-10-22 PROCEDURE — 6370000000 HC RX 637 (ALT 250 FOR IP): Performed by: NURSE PRACTITIONER

## 2024-10-22 PROCEDURE — 83735 ASSAY OF MAGNESIUM: CPT

## 2024-10-22 PROCEDURE — 97116 GAIT TRAINING THERAPY: CPT

## 2024-10-22 PROCEDURE — 6370000000 HC RX 637 (ALT 250 FOR IP): Performed by: STUDENT IN AN ORGANIZED HEALTH CARE EDUCATION/TRAINING PROGRAM

## 2024-10-22 PROCEDURE — 77412 RADIATION TX DELIVERY LVL 3: CPT

## 2024-10-22 PROCEDURE — 1100000000 HC RM PRIVATE

## 2024-10-22 PROCEDURE — 6370000000 HC RX 637 (ALT 250 FOR IP): Performed by: INTERNAL MEDICINE

## 2024-10-22 PROCEDURE — 77014 HC CT TREATMENT PLAN: CPT

## 2024-10-22 PROCEDURE — 6370000000 HC RX 637 (ALT 250 FOR IP)

## 2024-10-22 RX ORDER — LANOLIN ALCOHOL/MO/W.PET/CERES
400 CREAM (GRAM) TOPICAL DAILY
Status: DISCONTINUED | OUTPATIENT
Start: 2024-10-22 | End: 2024-10-22

## 2024-10-22 RX ORDER — BISACODYL 5 MG/1
10 TABLET, DELAYED RELEASE ORAL ONCE
Status: COMPLETED | OUTPATIENT
Start: 2024-10-22 | End: 2024-10-22

## 2024-10-22 RX ADMIN — ATORVASTATIN CALCIUM 10 MG: 10 TABLET, FILM COATED ORAL at 10:01

## 2024-10-22 RX ADMIN — PRAZOSIN HYDROCHLORIDE 1 MG: 1 CAPSULE ORAL at 20:37

## 2024-10-22 RX ADMIN — GABAPENTIN 100 MG: 100 CAPSULE ORAL at 10:02

## 2024-10-22 RX ADMIN — ESCITALOPRAM OXALATE 5 MG: 10 TABLET ORAL at 10:02

## 2024-10-22 RX ADMIN — PANTOPRAZOLE SODIUM 40 MG: 40 TABLET, DELAYED RELEASE ORAL at 17:06

## 2024-10-22 RX ADMIN — MAGNESIUM HYDROXIDE 30 ML: 400 SUSPENSION ORAL at 11:19

## 2024-10-22 RX ADMIN — BISACODYL 10 MG: 5 TABLET, COATED ORAL at 11:19

## 2024-10-22 RX ADMIN — OXYCODONE 5 MG: 5 TABLET ORAL at 13:46

## 2024-10-22 RX ADMIN — PANTOPRAZOLE SODIUM 40 MG: 40 TABLET, DELAYED RELEASE ORAL at 05:50

## 2024-10-22 RX ADMIN — MAGNESIUM OXIDE TAB 400 MG (241.3 MG ELEMENTAL MG) 400 MG: 400 (241.3 MG) TAB at 10:03

## 2024-10-22 RX ADMIN — GABAPENTIN 100 MG: 100 CAPSULE ORAL at 20:37

## 2024-10-22 ASSESSMENT — PAIN DESCRIPTION - DESCRIPTORS: DESCRIPTORS: ACHING

## 2024-10-22 ASSESSMENT — PAIN SCALES - GENERAL
PAINLEVEL_OUTOF10: 0
PAINLEVEL_OUTOF10: 2

## 2024-10-22 ASSESSMENT — PAIN DESCRIPTION - LOCATION: LOCATION: RECTUM

## 2024-10-22 NOTE — PROGRESS NOTES
Pt's bladder was too full for XRT.  She was asked to empty bladder while on the tx table but was unsuccessful.  DR Sevilla would like the floor RN to make sure bladder is empty prior to pt leaving the floor for future txs. Our RN Mariela, will call the floor with this request.

## 2024-10-22 NOTE — PLAN OF CARE
Problem: Physical Therapy - Adult  Goal: By Discharge: Performs mobility at highest level of function for planned discharge setting.  See evaluation for individualized goals.  Description: FUNCTIONAL STATUS PRIOR TO ADMISSION: Pt unable to provide due to cognitive status. Pt admitted from SNF. Per chart, pt was mod I with cane prior to recent hospitalization/SNF.     HOME SUPPORT PRIOR TO ADMISSION: The patient lived with son.    Physical Therapy Goals  1.  Patient will move from supine to sit and sit to supine in bed with modified independence within 7 day(s).    2.  Patient will perform sit to stand with modified independence within 7 day(s).  3.  Patient will transfer from bed to chair and chair to bed with supervision using the least restrictive device within 7 day(s).  4.  Patient will ambulate with supervision for 150 feet with the least restrictive device within 7 day(s).   5.  Patient will ascend/descend 2 stairs with one handrail(s) with contact guard assistance within 7 day(s).    Initiated 10/12/2024  1.  Patient will move from supine to sit and sit to supine in bed with contact guard assist within 7 day(s).    2.  Patient will perform sit to stand with minimal assistance within 7 day(s).  3.  Patient will transfer from bed to chair and chair to bed with minimal assistance using the least restrictive device within 7 day(s).  4.  Patient will ambulate with minimal assistance for 50 feet with the least restrictive device within 7 day(s).   5.  Patient will ascend/descend 2 stairs with one handrail(s) with moderate assistance within 7 day(s).    Outcome: Progressing   PHYSICAL THERAPY TREATMENT    Patient: Ronna Arzola (89 y.o. female)  Date: 10/22/2024  Diagnosis: Contusion of scalp, initial encounter [S00.03XA]  Contusion of sacrum, initial encounter [S30.0XXA]  Symptomatic anemia [D64.9]  Urinary tract infection with hematuria, site unspecified [N39.0, R31.9]  Anemia, unspecified type [D64.9]  Acute

## 2024-10-22 NOTE — CARE COORDINATION
Transition of Care Plan:    RUR: 22%  Prior Level of Functioning: Mod I with cane at [facility]  Disposition: [facility] with PT/OT   If SNF or IPR: Date FOC offered: 10/21  Date FOC received: patient may return to [facility]  Follow up appointments: pcp/specialist/radiation  DME needed: n/a  Transportation at discharge: family  IM/IMM Medicare/ letter given: will need prior to d/c   Caregiver Contact:   Secondary Decision Maker: Veronica Arzola - Other Relative - [phone]    Secondary Decision Maker: DariuszJess - Grandchild - [phone]    Discharge Caregiver contacted prior to discharge? Yes at bedside  Care Conference needed? no  Barriers to discharge: radiation tomorrow morning      CM spoke with ADALGISA Duong at [facility] [phone]. She can accept this patient on Wednesday, 10/23 after early radiation time. Unit [facility] is going to call radiation and see if patient can have an earlier time. MD and family are agreeable to this plan.    COLLIN Aguilera  Care Management Department  Ph:[phone]

## 2024-10-22 NOTE — PROGRESS NOTES
Physician Progress Note      PATIENT:               BRADY INFANTE  [facility] #:                  [id number]  :                       1935  ADMIT DATE:       10/11/2024 4:34 PM  DISCH DATE:  RESPONDING  PROVIDER #:        Tommy Martinez MD          QUERY TEXT:    Patient admitted with bleeding  due  to  malignancy.  Noted to have  severe    malnutrition on  eval  10/21.. If possible, please document in progress notes   and discharge summary if you are evaluating and /or treating any of the   following:    The medical record reflects the following:  Risk Factors:  Pt reports a poor appetite/intake ongoing over the past few   months associated w/   20 - 30 # wt loss, # p  Clinical Indicators:   Severe malnutrition (10/21/24 1314)  Context:  Acute Illness  Findings of the 6 clinical characteristics of malnutrition:  Energy Intake:  50% or less of estimated energy requirements for 5 or more   days  Weight Loss:  Greater than 5% over 1 month (  9% wt loss x 2 months)  Body Fat Loss:  No significant body fat loss  Muscle Mass Loss:  Mild muscle mass loss Temples (temporalis), Clavicles   (pectoralis & deltoids)  Fluid Accumulation:  No significant fluid accumulation   Strength:  Not Performed    Treatment: 1. Continue regular diet per SLP rec'd (slow rate of intake,   small/single bites and sips)  2. Glucerna strawberry 2x/day; snack 1x/day, prune juice 1x/day    ASPEN Criteria:    https://aspenjournals.onlinelibrary.prabhakar.com/doi/full/10.1177/576727435810820  5    Thank you  very  much.  Options provided:  -- Protein calorie malnutrition severe  -- Other - I will add my own diagnosis  -- Disagree - Not applicable / Not valid  -- Disagree - Clinically unable to determine / Unknown  -- Refer to Clinical Documentation Reviewer    PROVIDER RESPONSE TEXT:    This patient has severe protein calorie malnutrition.    Query created by: Lory Keating on 10/22/2024 6:41 AM      Electronically signed by:  Tommy DIAZ

## 2024-10-22 NOTE — PROGRESS NOTES
Occupational Therapy: defer    Chart reviewed up to date. Attempted to see pt for OT treatment. Pt currently LESLEY. Will defer for now and attempt back later able.     Thank you,  Jeana Bosch OTD, OTR/L

## 2024-10-22 NOTE — PLAN OF CARE
Problem: Discharge Planning  Goal: Discharge to home or other facility with appropriate resources  10/22/2024 1023 by Bob Byrne RN  Outcome: Progressing     Problem: Pain  Goal: Verbalizes/displays adequate comfort level or baseline comfort level  10/22/2024 1023 by Bob Byrne RN  Outcome: Adequate for Discharge     Problem: Safety - Adult  Goal: Free from fall injury  10/22/2024 1023 by Bob Byrne RN  Outcome: Adequate for Discharge     Problem: Skin/Tissue Integrity  Goal: Absence of new skin breakdown  Description: 1.  Monitor for areas of redness and/or skin breakdown  2.  Assess vascular access sites hourly  3.  Every 4-6 hours minimum:  Change oxygen saturation probe site  4.  Every 4-6 hours:  If on nasal continuous positive airway pressure, respiratory therapy assess nares and determine need for appliance change or resting period.  10/22/2024 1023 by Bob Byrne, RN  Outcome: Adequate for Discharge

## 2024-10-23 ENCOUNTER — HOSPITAL ENCOUNTER (OUTPATIENT)
Facility: HOSPITAL | Age: 89
Discharge: HOME | End: 2024-10-26
Attending: RADIOLOGY
Payer: MEDICARE

## 2024-10-23 VITALS
HEIGHT: 60 IN | BODY MASS INDEX: 31.71 KG/M2 | DIASTOLIC BLOOD PRESSURE: 60 MMHG | SYSTOLIC BLOOD PRESSURE: 147 MMHG | HEART RATE: 90 BPM | OXYGEN SATURATION: 95 % | WEIGHT: 161.5 LBS | TEMPERATURE: 98.4 F | RESPIRATION RATE: 16 BRPM

## 2024-10-23 DIAGNOSIS — C80.1 CANCER (HCC): Primary | ICD-10-CM

## 2024-10-23 DIAGNOSIS — C67.9 MALIGNANT NEOPLASM OF URINARY BLADDER, UNSPECIFIED SITE (HCC): Primary | ICD-10-CM

## 2024-10-23 PROBLEM — D64.9 ANEMIA: Status: RESOLVED | Noted: 2024-10-11 | Resolved: 2024-10-23

## 2024-10-23 PROBLEM — E43 SEVERE PROTEIN-CALORIE MALNUTRITION (HCC): Status: RESOLVED | Noted: 2024-10-21 | Resolved: 2024-10-23

## 2024-10-23 PROBLEM — D62 ACUTE BLOOD LOSS ANEMIA: Status: RESOLVED | Noted: 2024-10-12 | Resolved: 2024-10-23

## 2024-10-23 LAB
ANION GAP SERPL CALC-SCNC: 5 MMOL/L (ref 2–12)
BUN SERPL-MCNC: 5 MG/DL (ref 6–20)
BUN/CREAT SERPL: 9 (ref 12–20)
CALCIUM SERPL-MCNC: 8.2 MG/DL (ref 8.5–10.1)
CHLORIDE SERPL-SCNC: 110 MMOL/L (ref 97–108)
CO2 SERPL-SCNC: 27 MMOL/L (ref 21–32)
CREAT SERPL-MCNC: 0.57 MG/DL (ref 0.55–1.02)
ERYTHROCYTE [DISTWIDTH] IN BLOOD BY AUTOMATED COUNT: 18.5 % (ref 11.5–14.5)
GLUCOSE SERPL-MCNC: 84 MG/DL (ref 65–100)
HCT VFR BLD AUTO: 27.8 % (ref 35–47)
HGB BLD-MCNC: 8.3 G/DL (ref 11.5–16)
MAGNESIUM SERPL-MCNC: 2.2 MG/DL (ref 1.6–2.4)
MCH RBC QN AUTO: 24.7 PG (ref 26–34)
MCHC RBC AUTO-ENTMCNC: 29.9 G/DL (ref 30–36.5)
MCV RBC AUTO: 82.7 FL (ref 80–99)
NRBC # BLD: 0 K/UL (ref 0–0.01)
NRBC BLD-RTO: 0 PER 100 WBC
PHOSPHATE SERPL-MCNC: 3 MG/DL (ref 2.6–4.7)
PLATELET # BLD AUTO: 216 K/UL (ref 150–400)
PMV BLD AUTO: 10.5 FL (ref 8.9–12.9)
POTASSIUM SERPL-SCNC: 3.7 MMOL/L (ref 3.5–5.1)
RAD ONC ARIA COURSE FIRST TREATMENT DATE: NORMAL
RAD ONC ARIA COURSE ID: NORMAL
RAD ONC ARIA COURSE INTENT: NORMAL
RAD ONC ARIA COURSE LAST TREATMENT DATE: NORMAL
RAD ONC ARIA COURSE SESSION NUMBER: 4
RAD ONC ARIA COURSE START DATE: NORMAL
RAD ONC ARIA COURSE TREATMENT ELAPSED DAYS: 6
RAD ONC ARIA PLAN FRACTIONS TREATED TO DATE: 4
RAD ONC ARIA PLAN ID: NORMAL
RAD ONC ARIA PLAN PRESCRIBED DOSE PER FRACTION: 3 GY
RAD ONC ARIA PLAN PRIMARY REFERENCE POINT: NORMAL
RAD ONC ARIA PLAN TOTAL FRACTIONS PRESCRIBED: 10
RAD ONC ARIA PLAN TOTAL PRESCRIBED DOSE: 3000 CGY
RAD ONC ARIA REFERENCE POINT DOSAGE GIVEN TO DATE: 12 GY
RAD ONC ARIA REFERENCE POINT DOSAGE GIVEN TO DATE: 12.37 GY
RAD ONC ARIA REFERENCE POINT ID: NORMAL
RAD ONC ARIA REFERENCE POINT ID: NORMAL
RAD ONC ARIA REFERENCE POINT SESSION DOSAGE GIVEN: 3 GY
RAD ONC ARIA REFERENCE POINT SESSION DOSAGE GIVEN: 3.09 GY
RBC # BLD AUTO: 3.36 M/UL (ref 3.8–5.2)
SODIUM SERPL-SCNC: 142 MMOL/L (ref 136–145)
WBC # BLD AUTO: 8.8 K/UL (ref 3.6–11)

## 2024-10-23 PROCEDURE — 77014 HC CT TREATMENT PLAN: CPT

## 2024-10-23 PROCEDURE — 80048 BASIC METABOLIC PNL TOTAL CA: CPT

## 2024-10-23 PROCEDURE — 6370000000 HC RX 637 (ALT 250 FOR IP)

## 2024-10-23 PROCEDURE — 83735 ASSAY OF MAGNESIUM: CPT

## 2024-10-23 PROCEDURE — 77412 RADIATION TX DELIVERY LVL 3: CPT

## 2024-10-23 PROCEDURE — 85027 COMPLETE CBC AUTOMATED: CPT

## 2024-10-23 PROCEDURE — 77307 TELETHX ISODOSE PLAN CPLX: CPT

## 2024-10-23 PROCEDURE — 84100 ASSAY OF PHOSPHORUS: CPT

## 2024-10-23 PROCEDURE — 6370000000 HC RX 637 (ALT 250 FOR IP): Performed by: STUDENT IN AN ORGANIZED HEALTH CARE EDUCATION/TRAINING PROGRAM

## 2024-10-23 PROCEDURE — 6370000000 HC RX 637 (ALT 250 FOR IP): Performed by: INTERNAL MEDICINE

## 2024-10-23 PROCEDURE — 6370000000 HC RX 637 (ALT 250 FOR IP): Performed by: NURSE PRACTITIONER

## 2024-10-23 PROCEDURE — 77334 RADIATION TREATMENT AID(S): CPT

## 2024-10-23 PROCEDURE — 51798 US URINE CAPACITY MEASURE: CPT

## 2024-10-23 RX ORDER — OXYCODONE HYDROCHLORIDE 5 MG/1
5 TABLET ORAL PRN
Qty: 20 TABLET | Refills: 0 | OUTPATIENT
Start: 2024-10-23 | End: 2024-10-28

## 2024-10-23 RX ORDER — OXYCODONE HYDROCHLORIDE 5 MG/1
5 TABLET ORAL DAILY
Qty: 6 TABLET | Refills: 0 | OUTPATIENT
Start: 2024-10-23 | End: 2024-10-29

## 2024-10-23 RX ORDER — GABAPENTIN 100 MG/1
100 CAPSULE ORAL 2 TIMES DAILY
Qty: 90 CAPSULE | Refills: 3 | Status: SHIPPED | OUTPATIENT
Start: 2024-10-23 | End: 2025-04-21

## 2024-10-23 RX ORDER — PANTOPRAZOLE SODIUM 40 MG/1
40 TABLET, DELAYED RELEASE ORAL
Qty: 30 TABLET | Refills: 3 | Status: SHIPPED | OUTPATIENT
Start: 2024-10-23

## 2024-10-23 RX ORDER — OXYCODONE HYDROCHLORIDE 5 MG/1
5 TABLET ORAL PRN
Qty: 6 TABLET | Refills: 0 | OUTPATIENT
Start: 2024-10-23 | End: 2024-10-29

## 2024-10-23 RX ADMIN — ATORVASTATIN CALCIUM 10 MG: 10 TABLET, FILM COATED ORAL at 08:19

## 2024-10-23 RX ADMIN — OXYCODONE 5 MG: 5 TABLET ORAL at 08:18

## 2024-10-23 RX ADMIN — ASPIRIN 81 MG: 81 TABLET, COATED ORAL at 08:19

## 2024-10-23 RX ADMIN — GABAPENTIN 100 MG: 100 CAPSULE ORAL at 08:18

## 2024-10-23 RX ADMIN — PANTOPRAZOLE SODIUM 40 MG: 40 TABLET, DELAYED RELEASE ORAL at 06:19

## 2024-10-23 RX ADMIN — ESCITALOPRAM OXALATE 5 MG: 10 TABLET ORAL at 08:19

## 2024-10-23 ASSESSMENT — PAIN SCALES - GENERAL: PAINLEVEL_OUTOF10: 3

## 2024-10-23 NOTE — DISCHARGE SUMMARY
Discharge Summary       PATIENT ID: Ronna Arzola  MRN: [id number]   YOB: 1935    DATE OF ADMISSION: 10/11/2024  4:34 PM    DATE OF DISCHARGE: 10/23/2024   PRIMARY CARE PROVIDER: Carmen Aguero MD     ATTENDING PHYSICIAN: Rocio Fang MD   DISCHARGING PROVIDER: JUNG Beavers CNP    To contact this individual call [phone] and ask the  to page.  If unavailable ask to be transferred the Adult Hospitalist Department.    CONSULTATIONS: IP CONSULT TO GI  IP CONSULT TO ONCOLOGY  IP CONSULT TO PALLIATIVE CARE  IP CONSULT TO RADIATION ONCOLOGY  IP CONSULT TO PALLIATIVE CARE  IP CONSULT TO CASE MANAGEMENT    PROCEDURES/SURGERIES: * No surgery found *    ADMITTING DIAGNOSES & HOSPITAL COURSE:   Ronna Arzola is a 89 y.o. female with history of urothelial carcinoma of bladder, hypertension, insomnia, chronic constipation who presented to the ER from nursing facility for evaluation of falls weakness, fatigue and malaise.  She reports near 1 week of severe weakness as well as symptoms into falls.  Sustained a head injury last week.  Today she lost her footing and fell backward and landed on her buttock.  Noted some pain in his sacrum after her fall.  No reported head injury with this fall.  When told about her anemia she does admit to intermittent episodes of occasional dark stools and occasionally bright red blood in her stools.  The patient denies any fever, chills, chest or abdominal pain, nausea, vomiting, cough, congestion, recent illness, palpitations, or dysuria.    Patient received a total transfusion of 2u PRBC since admission.  Tolerating radiation. She feels ok and has been deemed medically stable to return to her assisted living facility having achieved the maximum benefit of his inpatient hospitalization.  Will need to complete her radiation treatments  RESULTS:   At the time of discharge the following test results are still pending: none    FOLLOW UP APPOINTMENTS:    [unfilled]     ADDITIONAL CARE RECOMMENDATIONS: continue radiation as outpatient     DIET: regular diet  Oral Nutritional Supplements: Ensure Clear or Ensure Active ClearOnce daily    ACTIVITY: activity as tolerated    WOUND CARE: n/a    EQUIPMENT needed: n/a      DISCHARGE MEDICATIONS:  Current Facility-Administered Medications   Medication Dose Route Frequency Provider Last Rate Last Admin    pantoprazole (PROTONIX) tablet 40 mg  40 mg Oral BID AC Xin Cuba APRN - NP   40 mg at 10/23/24 0619    gabapentin (NEURONTIN) capsule 100 mg  100 mg Oral BID Zee Matthewsarehermelinda TERRY APRN - NP   100 mg at 10/23/24 0818    sodium chloride flush 0.9 % injection 5-40 mL  5-40 mL IntraVENous 2 times per day Zamzam Thompson MD   10 mL at 10/20/24 2108    sodium chloride flush 0.9 % injection 5-40 mL  5-40 mL IntraVENous PRN Zamzam Thompson MD        0.9 % sodium chloride infusion   IntraVENous PRN Zamzam Thompson MD        potassium chloride (KLOR-CON) extended release tablet 40 mEq  40 mEq Oral PRN Zamzam Thompson MD   40 mEq at 10/15/24 0656    Or    potassium bicarb-citric acid (EFFER-K) effervescent tablet 40 mEq  40 mEq Oral PRN Zamzam Thompson MD        Or    potassium chloride 10 mEq/100 mL IVPB (Peripheral Line)  10 mEq IntraVENous PRN Zamzam Thompson MD   Stopped at 10/12/24 1009    magnesium sulfate 2000 mg in 50 mL IVPB premix  2,000 mg IntraVENous PRN Zamzam Thompson MD        ondansetron (ZOFRAN-ODT) disintegrating tablet 4 mg  4 mg Oral Q8H PRN Zamzam Thompson MD        Or    ondansetron (ZOFRAN) injection 4 mg  4 mg IntraVENous Q6H PRN Zamzam Thompson MD        polyethylene glycol (GLYCOLAX) packet 17 g  17 g Oral Daily PRN Zamzam Thompson MD   17 g at 10/21/24 2043    acetaminophen (TYLENOL) tablet 650 mg  650 mg Oral Q6H PRN Zamzam Thompson MD   650 mg at

## 2024-10-23 NOTE — PROGRESS NOTES
Patient received radiation treatment number 4 of 10 to the bladder/pelvis today.  Kiesha Sams  RT(T)

## 2024-10-23 NOTE — CARE COORDINATION
ASHLEY:    CM scheduled transport with [facility] for 12:30 p.m. Patient completed radiation 4/10.  RN can call report [phone]. CM faxed d/c summary and home health orders to facility at [phone]. Patient son, Parker Arzola notified by telephone of discharge time.    [facility]  Care Management Department  Ph:[phone]

## 2024-10-23 NOTE — PROGRESS NOTES
[facility] [place] [facility] [place]  [facility]    Radiation Oncology Weekly Progress Note    Ronna Arzola  [id number]  1935     Diagnosis   Cancer Staging   No matching staging information was found for the patient.    AJCC Staging has been reviewed.    Interval History   Ms. Arzola is a 89 y.o. female seen today for her weekly on-treatment evaluation    10/22/24: Just started, no changes in urination, no other complaints. Bladder too full today, will see if patient can empty bladder. If not, will try to open up field to include all of bladder in the field.    Treatment Details:     Treatment Site Dose/Fx (cGy) #Fx Current Dose (cGy) Total Planned Dose (cGy) Start Date End Date   bladder 300 3 900 3000 10/17/24 ongoing     Concurrent Therapy: No concurrent systemic therapy  Response to treatment: N/A    Allergies and Medications     Allergies   Allergen Reactions    Latex Rash       Current Outpatient Medications   Medication Instructions    acetaminophen (TYLENOL) 500 mg, Oral, EVERY 6 HOURS PRN    amLODIPine (NORVASC) 2.5 mg, Oral, DAILY    atorvastatin (LIPITOR) 10 mg, Oral, DAILY    cyanocobalamin 1,000 mcg, Oral, DAILY    escitalopram (LEXAPRO) 5 MG tablet 1 tablet, Oral, EVERY MORNING    gabapentin (NEURONTIN) 100 mg, Oral, 3 TIMES DAILY    lisinopril (PRINIVIL;ZESTRIL) 10 mg, Oral, DAILY    mirtazapine (REMERON) 7.5 mg, Oral, NIGHTLY    oxyCODONE (ROXICODONE) 5 MG immediate release tablet TAKE 1/2 TABLET BY MOUTH EVERY 6 HOURS AS NEEDED FOR PAIN    prazosin (MINIPRESS) 1 mg, Oral, NIGHTLY    vitamin D (CHOLECALCIFEROL) 125 mcg, Oral, DAILY        Physical Exam:   Vitals: There were no vitals taken for this visit. Pain Level: 0  Performance Status: ECOG 3, Capable of only limited self-care, confined to bed or chair more than 50% of waking hours  Constitutional: Pleasant, sitting comfortably in no acute distress.  Respiratory: Non-labored breathing  Neurologic: Alert and

## 2024-10-24 ENCOUNTER — APPOINTMENT (OUTPATIENT)
Facility: HOSPITAL | Age: 89
End: 2024-10-24
Attending: RADIOLOGY
Payer: MEDICARE

## 2024-10-25 ENCOUNTER — HOSPITAL ENCOUNTER (OUTPATIENT)
Facility: HOSPITAL | Age: 89
Discharge: HOME | End: 2024-10-28
Attending: RADIOLOGY
Payer: MEDICARE

## 2024-10-25 LAB
RAD ONC ARIA COURSE FIRST TREATMENT DATE: NORMAL
RAD ONC ARIA COURSE ID: NORMAL
RAD ONC ARIA COURSE INTENT: NORMAL
RAD ONC ARIA COURSE LAST TREATMENT DATE: NORMAL
RAD ONC ARIA COURSE SESSION NUMBER: 5
RAD ONC ARIA COURSE START DATE: NORMAL
RAD ONC ARIA COURSE TREATMENT ELAPSED DAYS: 8
RAD ONC ARIA PLAN FRACTIONS TREATED TO DATE: 1
RAD ONC ARIA PLAN ID: NORMAL
RAD ONC ARIA PLAN PRESCRIBED DOSE PER FRACTION: 3 GY
RAD ONC ARIA PLAN PRIMARY REFERENCE POINT: NORMAL
RAD ONC ARIA PLAN TOTAL FRACTIONS PRESCRIBED: 6
RAD ONC ARIA PLAN TOTAL PRESCRIBED DOSE: 1800 CGY
RAD ONC ARIA REFERENCE POINT DOSAGE GIVEN TO DATE: 15 GY
RAD ONC ARIA REFERENCE POINT DOSAGE GIVEN TO DATE: 15.47 GY
RAD ONC ARIA REFERENCE POINT ID: NORMAL
RAD ONC ARIA REFERENCE POINT ID: NORMAL
RAD ONC ARIA REFERENCE POINT SESSION DOSAGE GIVEN: 3 GY
RAD ONC ARIA REFERENCE POINT SESSION DOSAGE GIVEN: 3.09 GY

## 2024-10-25 PROCEDURE — 77336 RADIATION PHYSICS CONSULT: CPT

## 2024-10-25 PROCEDURE — 77280 THER RAD SIMULAJ FIELD SMPL: CPT

## 2024-10-25 PROCEDURE — 77412 RADIATION TX DELIVERY LVL 3: CPT

## 2024-10-25 PROCEDURE — 77387 GUIDANCE FOR RADJ TX DLVR: CPT

## 2024-10-28 ENCOUNTER — HOSPITAL ENCOUNTER (OUTPATIENT)
Facility: HOSPITAL | Age: 89
Discharge: HOME | End: 2024-10-31
Attending: RADIOLOGY
Payer: MEDICARE

## 2024-10-28 ENCOUNTER — TELEPHONE (OUTPATIENT)
Age: 89
End: 2024-10-28

## 2024-10-28 DIAGNOSIS — C80.1 CANCER (HCC): ICD-10-CM

## 2024-10-28 LAB
RAD ONC ARIA COURSE FIRST TREATMENT DATE: NORMAL
RAD ONC ARIA COURSE ID: NORMAL
RAD ONC ARIA COURSE INTENT: NORMAL
RAD ONC ARIA COURSE LAST TREATMENT DATE: NORMAL
RAD ONC ARIA COURSE SESSION NUMBER: 6
RAD ONC ARIA COURSE START DATE: NORMAL
RAD ONC ARIA COURSE TREATMENT ELAPSED DAYS: 11
RAD ONC ARIA PLAN FRACTIONS TREATED TO DATE: 2
RAD ONC ARIA PLAN ID: NORMAL
RAD ONC ARIA PLAN PRESCRIBED DOSE PER FRACTION: 3 GY
RAD ONC ARIA PLAN PRIMARY REFERENCE POINT: NORMAL
RAD ONC ARIA PLAN TOTAL FRACTIONS PRESCRIBED: 6
RAD ONC ARIA PLAN TOTAL PRESCRIBED DOSE: 1800 CGY
RAD ONC ARIA REFERENCE POINT DOSAGE GIVEN TO DATE: 18 GY
RAD ONC ARIA REFERENCE POINT DOSAGE GIVEN TO DATE: 18.56 GY
RAD ONC ARIA REFERENCE POINT ID: NORMAL
RAD ONC ARIA REFERENCE POINT ID: NORMAL
RAD ONC ARIA REFERENCE POINT SESSION DOSAGE GIVEN: 3 GY
RAD ONC ARIA REFERENCE POINT SESSION DOSAGE GIVEN: 3.09 GY

## 2024-10-28 PROCEDURE — 77014 HC CT TREATMENT PLAN: CPT

## 2024-10-28 PROCEDURE — 77412 RADIATION TX DELIVERY LVL 3: CPT

## 2024-10-28 RX ORDER — OXYCODONE HYDROCHLORIDE 5 MG/1
5 TABLET ORAL PRN
Qty: 20 TABLET | Refills: 0 | Status: SHIPPED | OUTPATIENT
Start: 2024-10-28 | End: 2024-11-02

## 2024-10-28 NOTE — TELEPHONE ENCOUNTER
Pt son said pt has refused the chemo & radiation... He stated they want to do 10 days of radiation pt has completed 7 pt wants port removed pt will be done by 10/31/24. Please call and give direction on how to have port removed...

## 2024-10-29 ENCOUNTER — HOSPITAL ENCOUNTER (OUTPATIENT)
Facility: HOSPITAL | Age: 89
Discharge: HOME | End: 2024-11-01
Attending: RADIOLOGY
Payer: MEDICARE

## 2024-10-29 LAB
RAD ONC ARIA COURSE FIRST TREATMENT DATE: NORMAL
RAD ONC ARIA COURSE ID: NORMAL
RAD ONC ARIA COURSE INTENT: NORMAL
RAD ONC ARIA COURSE LAST TREATMENT DATE: NORMAL
RAD ONC ARIA COURSE SESSION NUMBER: 7
RAD ONC ARIA COURSE START DATE: NORMAL
RAD ONC ARIA COURSE TREATMENT ELAPSED DAYS: 12
RAD ONC ARIA PLAN FRACTIONS TREATED TO DATE: 3
RAD ONC ARIA PLAN ID: NORMAL
RAD ONC ARIA PLAN PRESCRIBED DOSE PER FRACTION: 3 GY
RAD ONC ARIA PLAN PRIMARY REFERENCE POINT: NORMAL
RAD ONC ARIA PLAN TOTAL FRACTIONS PRESCRIBED: 6
RAD ONC ARIA PLAN TOTAL PRESCRIBED DOSE: 1800 CGY
RAD ONC ARIA REFERENCE POINT DOSAGE GIVEN TO DATE: 21 GY
RAD ONC ARIA REFERENCE POINT DOSAGE GIVEN TO DATE: 21.65 GY
RAD ONC ARIA REFERENCE POINT ID: NORMAL
RAD ONC ARIA REFERENCE POINT ID: NORMAL
RAD ONC ARIA REFERENCE POINT SESSION DOSAGE GIVEN: 3 GY
RAD ONC ARIA REFERENCE POINT SESSION DOSAGE GIVEN: 3.09 GY

## 2024-10-29 PROCEDURE — 77412 RADIATION TX DELIVERY LVL 3: CPT

## 2024-10-29 PROCEDURE — 77014 HC CT TREATMENT PLAN: CPT

## 2024-10-29 ASSESSMENT — PAIN SCALES - GENERAL: PAINLEVEL_OUTOF10: 0

## 2024-10-29 NOTE — PROGRESS NOTES
[facility] [place] [facility] [place]  [facility]    Radiation Oncology Weekly Progress Note    Ronna Arzola  [id number]  1935     Diagnosis    Cancer Staging   No matching staging information was found for the patient.    AJCC Staging has been reviewed.    Interval History   Ms. Arzola is a 89 y.o. female seen today for her weekly on-treatment evaluation    10/22/24: Just started, no changes in urination, no other complaints. Bladder too full today, will see if patient can empty bladder. If not, will try to open up field to include all of bladder in the field.  10/29/24- doing better, no more trouble with urination, no bleeding, no pain or tingling or burning. Feeling better, looks better too. Will follow with Dr. Tay and return on an as-needed basis.    Treatment Details:     Treatment Site Dose/Fx (cGy) #Fx Current Dose (cGy) Total Planned Dose (cGy) Start Date End Date   bladder 300 7 2100 3000 10/17/24 ongoing     Concurrent Therapy: No concurrent systemic therapy  Response to treatment: N/A    Allergies and Medications     Allergies   Allergen Reactions    Latex Rash       Current Outpatient Medications   Medication Instructions    acetaminophen (TYLENOL) 500 mg, Oral, EVERY 6 HOURS PRN    amLODIPine (NORVASC) 2.5 mg, Oral, DAILY    atorvastatin (LIPITOR) 10 mg, Oral, DAILY    cyanocobalamin 1,000 mcg, Oral, DAILY    escitalopram (LEXAPRO) 5 MG tablet 1 tablet, Oral, EVERY MORNING    gabapentin (NEURONTIN) 100 mg, Oral, 2 TIMES DAILY    lisinopril (PRINIVIL;ZESTRIL) 10 mg, Oral, DAILY    mirtazapine (REMERON) 7.5 mg, Oral, NIGHTLY    oxyCODONE (ROXICODONE) 5 MG immediate release tablet TAKE 1/2 TABLET BY MOUTH EVERY 6 HOURS AS NEEDED FOR PAIN    oxyCODONE (ROXICODONE) 5 mg, Oral, PRN, Intended supply: 5 days. Take lowest dose possible to manage pain    oxyCODONE (ROXICODONE) 5 mg, Oral, PRN, Intended supply: 5 days. Take lowest dose possible to manage pain    oxyCODONE (ROXICODONE) 5

## 2024-10-29 NOTE — TELEPHONE ENCOUNTER
I returned call to pt son to ask if pt still wanted to come in to discuss next steps.  He is confused and has a lot questions.    Pt is doing 10 days of radiation, not the 4-6 weeks and she does not want to do chemo.    I confirmed that I do not see on the patient chart that she has had a port entered, as we did not enter an order for a port.    Will come in on 230pm on Friday 11/1/24 to discuss what next steps are etc..          .

## 2024-10-30 ENCOUNTER — HOSPITAL ENCOUNTER (OUTPATIENT)
Facility: HOSPITAL | Age: 89
Discharge: HOME | End: 2024-11-02
Attending: RADIOLOGY
Payer: MEDICARE

## 2024-10-30 ENCOUNTER — APPOINTMENT (OUTPATIENT)
Facility: HOSPITAL | Age: 89
End: 2024-10-30
Attending: RADIOLOGY
Payer: MEDICARE

## 2024-10-30 LAB
RAD ONC ARIA COURSE FIRST TREATMENT DATE: NORMAL
RAD ONC ARIA COURSE ID: NORMAL
RAD ONC ARIA COURSE INTENT: NORMAL
RAD ONC ARIA COURSE LAST TREATMENT DATE: NORMAL
RAD ONC ARIA COURSE SESSION NUMBER: 8
RAD ONC ARIA COURSE START DATE: NORMAL
RAD ONC ARIA COURSE TREATMENT ELAPSED DAYS: 13
RAD ONC ARIA PLAN FRACTIONS TREATED TO DATE: 4
RAD ONC ARIA PLAN ID: NORMAL
RAD ONC ARIA PLAN PRESCRIBED DOSE PER FRACTION: 3 GY
RAD ONC ARIA PLAN PRIMARY REFERENCE POINT: NORMAL
RAD ONC ARIA PLAN TOTAL FRACTIONS PRESCRIBED: 6
RAD ONC ARIA PLAN TOTAL PRESCRIBED DOSE: 1800 CGY
RAD ONC ARIA REFERENCE POINT DOSAGE GIVEN TO DATE: 24 GY
RAD ONC ARIA REFERENCE POINT DOSAGE GIVEN TO DATE: 24.74 GY
RAD ONC ARIA REFERENCE POINT ID: NORMAL
RAD ONC ARIA REFERENCE POINT ID: NORMAL
RAD ONC ARIA REFERENCE POINT SESSION DOSAGE GIVEN: 3 GY
RAD ONC ARIA REFERENCE POINT SESSION DOSAGE GIVEN: 3.09 GY

## 2024-10-30 PROCEDURE — 77014 HC CT TREATMENT PLAN: CPT

## 2024-10-30 PROCEDURE — 77412 RADIATION TX DELIVERY LVL 3: CPT

## 2024-10-31 ENCOUNTER — HOSPITAL ENCOUNTER (OUTPATIENT)
Facility: HOSPITAL | Age: 89
Discharge: HOME | End: 2024-11-03
Attending: RADIOLOGY
Payer: MEDICARE

## 2024-10-31 ENCOUNTER — APPOINTMENT (OUTPATIENT)
Facility: HOSPITAL | Age: 89
End: 2024-10-31
Attending: RADIOLOGY
Payer: MEDICARE

## 2024-10-31 LAB
RAD ONC ARIA COURSE FIRST TREATMENT DATE: NORMAL
RAD ONC ARIA COURSE ID: NORMAL
RAD ONC ARIA COURSE INTENT: NORMAL
RAD ONC ARIA COURSE LAST TREATMENT DATE: NORMAL
RAD ONC ARIA COURSE SESSION NUMBER: 9
RAD ONC ARIA COURSE START DATE: NORMAL
RAD ONC ARIA COURSE TREATMENT ELAPSED DAYS: 14
RAD ONC ARIA PLAN FRACTIONS TREATED TO DATE: 5
RAD ONC ARIA PLAN ID: NORMAL
RAD ONC ARIA PLAN PRESCRIBED DOSE PER FRACTION: 3 GY
RAD ONC ARIA PLAN PRIMARY REFERENCE POINT: NORMAL
RAD ONC ARIA PLAN TOTAL FRACTIONS PRESCRIBED: 6
RAD ONC ARIA PLAN TOTAL PRESCRIBED DOSE: 1800 CGY
RAD ONC ARIA REFERENCE POINT DOSAGE GIVEN TO DATE: 27 GY
RAD ONC ARIA REFERENCE POINT DOSAGE GIVEN TO DATE: 27.84 GY
RAD ONC ARIA REFERENCE POINT ID: NORMAL
RAD ONC ARIA REFERENCE POINT ID: NORMAL
RAD ONC ARIA REFERENCE POINT SESSION DOSAGE GIVEN: 3 GY
RAD ONC ARIA REFERENCE POINT SESSION DOSAGE GIVEN: 3.09 GY

## 2024-10-31 PROCEDURE — 77014 HC CT TREATMENT PLAN: CPT

## 2024-10-31 PROCEDURE — 77412 RADIATION TX DELIVERY LVL 3: CPT

## 2024-11-01 ENCOUNTER — HOSPITAL ENCOUNTER (OUTPATIENT)
Facility: HOSPITAL | Age: 89
Discharge: HOME | End: 2024-11-04
Attending: RADIOLOGY
Payer: MEDICARE

## 2024-11-01 ENCOUNTER — OFFICE VISIT (OUTPATIENT)
Age: 89
End: 2024-11-01
Payer: MEDICARE

## 2024-11-01 ENCOUNTER — CLINICAL DOCUMENTATION (OUTPATIENT)
Age: 89
End: 2024-11-01

## 2024-11-01 VITALS
WEIGHT: 142.2 LBS | HEIGHT: 60 IN | RESPIRATION RATE: 16 BRPM | OXYGEN SATURATION: 97 % | TEMPERATURE: 98.6 F | BODY MASS INDEX: 27.92 KG/M2 | HEART RATE: 75 BPM | SYSTOLIC BLOOD PRESSURE: 128 MMHG | DIASTOLIC BLOOD PRESSURE: 51 MMHG

## 2024-11-01 DIAGNOSIS — D50.0 IRON DEFICIENCY ANEMIA SECONDARY TO BLOOD LOSS (CHRONIC): ICD-10-CM

## 2024-11-01 DIAGNOSIS — C67.8 MALIGNANT NEOPLASM OF OVERLAPPING SITES OF BLADDER (HCC): Primary | ICD-10-CM

## 2024-11-01 LAB
RAD ONC ARIA COURSE FIRST TREATMENT DATE: NORMAL
RAD ONC ARIA COURSE ID: NORMAL
RAD ONC ARIA COURSE INTENT: NORMAL
RAD ONC ARIA COURSE LAST TREATMENT DATE: NORMAL
RAD ONC ARIA COURSE SESSION NUMBER: 10
RAD ONC ARIA COURSE START DATE: NORMAL
RAD ONC ARIA COURSE TREATMENT ELAPSED DAYS: 15
RAD ONC ARIA PLAN FRACTIONS TREATED TO DATE: 6
RAD ONC ARIA PLAN ID: NORMAL
RAD ONC ARIA PLAN PRESCRIBED DOSE PER FRACTION: 3 GY
RAD ONC ARIA PLAN PRIMARY REFERENCE POINT: NORMAL
RAD ONC ARIA PLAN TOTAL FRACTIONS PRESCRIBED: 6
RAD ONC ARIA PLAN TOTAL PRESCRIBED DOSE: 1800 CGY
RAD ONC ARIA REFERENCE POINT DOSAGE GIVEN TO DATE: 30 GY
RAD ONC ARIA REFERENCE POINT DOSAGE GIVEN TO DATE: 30.93 GY
RAD ONC ARIA REFERENCE POINT ID: NORMAL
RAD ONC ARIA REFERENCE POINT ID: NORMAL
RAD ONC ARIA REFERENCE POINT SESSION DOSAGE GIVEN: 3 GY
RAD ONC ARIA REFERENCE POINT SESSION DOSAGE GIVEN: 3.09 GY

## 2024-11-01 PROCEDURE — 77412 RADIATION TX DELIVERY LVL 3: CPT

## 2024-11-01 PROCEDURE — 77336 RADIATION PHYSICS CONSULT: CPT

## 2024-11-01 PROCEDURE — 77014 HC CT TREATMENT PLAN: CPT

## 2024-11-01 PROCEDURE — 99213 OFFICE O/P EST LOW 20 MIN: CPT | Performed by: INTERNAL MEDICINE

## 2024-11-01 NOTE — PROGRESS NOTES
Donovan Wang  Oncology Social Work Encounter    [x] Med-Onc [facility] [] Med-Onc [facility] [] Med-Onc [facility] [] Rad-Onc [facility] [] Rad-Onc [facility] [] Rad-Onc [facility] [] Rad[facility] [] [facility] [] [facility]      Patient: Ronna Arzola    Encounter Type:    [] Initial SW Encounter  [] Patient Initiated  [] Referral  [] Distress/PHQ Screening  [x] Other:      Concern(s)/Barrier(s) to Care:     Narrative: pt is not a candidate for treatment. Pt is a [facility] off [facility] for rehab, sleeping a lot and has lost weight, says she isn't happy.     Referral/Handouts:         Plan:

## 2024-11-01 NOTE — PROGRESS NOTES
[facility] [place]  at [facility]  [address], [address]  [place] [address]  [phone]       Oncology Note        Patient: Ronna Arzola MRN: [id number]  SSN: [id number]    YOB: 1935  Age: 89 y.o.  Sex: female        Diagnosis:     1. 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   At least T3 N0 (Stage III)    Subjective:      Ronna Arzola is a 89 y.o. female with a new diagnosis of bladder cancer. She was admitted to the hospital with generalized weakness for over 6 months. UA showed hematuria. She has been experiencing hematuria for over 3-4 months. A CT showed bladder tumor extending through the right lateral wall of the bladder. She underwent a cystoscopy and TURBT which revealed poorly differentiated urothelial carcinoma. She lives in an assisted living facility. She denies abdominal pain however she is experiencing significant hematuria. She is walking with with a rolator.     She was preparing to start chemotherapy and radiation and then she fell, landed in the hospital. She received transfusion and IV iron. Started primary radiation to bladder cancer and finished it on 10/31/2024. She feels fatigued.       Review of Systems:    Constitutional: negative  Eyes: negative  Ears, Nose, Mouth, Throat, and Face: negative  Respiratory: negative  Cardiovascular: negative  Gastrointestinal: negative  Genitourinary: hematuria  Integument/Breast: negative  Hematologic/Lymphatic: negative  Musculoskeletal:negative  Neurological: negative    Past Medical History:   Diagnosis Date    Bladder tumor     Hypertension     Vitamin B deficiency     Vitamin D deficiency      Past Surgical History:   Procedure Laterality Date    ABDOMINAL EXPLORATION SURGERY      to remove a mass    BLADDER SURGERY N/A 8/22/2024    TRANSURETHRAL RESECTION OF BLADDER TUMOR performed by Trace Hernandez MD at [facility] MAIN OR    CATARACT EXTRACTION,

## 2024-11-01 NOTE — PROGRESS NOTES
89 y/o AAF here for follow up appointment for bladder cancer.  She has completed radiation as of today  Pt does not want to do chemotherapy.  Here to discuss next steps.  Pt has lost significant amount of weight since last visit.  She lives in assisted living facility as of early October.  She gets 3 meals a day and gets options but sometimes she skips them . Pt says she just doesn't have an appetite. \"The medicine makes me sleep, I don't feel like eating because I am tired\".  She has been taking oxycodone, she says for her rectal pain.   Son reports she also takes it for restless leg due to not being able to sit still for radiation.  Pt says she has been having bowel movements, not hard or diarrhea.    Pt son here with pt.  Pt does not recall the conversation however about the options she had.      1. Have you been to the ER, urgent care clinic since your last visit?  Hospitalized since your last visit? [facility] 10/11/24-10/23/24  for blood loss.    2. Have you seen or consulted any other health care providers outside of the [facility] since your last visit?  Include any pap smears or colon screening. [facility]. (Assisted living )

## 2024-11-06 ENCOUNTER — TELEPHONE (OUTPATIENT)
Age: 89
End: 2024-11-06

## 2024-11-06 NOTE — TELEPHONE ENCOUNTER
The patient's son Parker is calling to discuss Palliative Medicine and possibly schedule an appointment. # [phone].

## 2024-11-07 NOTE — TELEPHONE ENCOUNTER
[facility] Outpatient Palliative Medicine Office  Nursing Note  [phone]  Fax [phone]     Name:  Ronna Arzola  YOB: 1935     Returned call to patient's son Parker Arzola.  He is inquiring about outpatient Palliative Medicine for his mother Ronna Arzola.     Chart  reviewed. Ronna Arzola is a 89 y.o. female with recently diagnosed bladder cancer.   Patient was hospitalized at [facility] 10/11/24-10/23/24  due to anemia, weakness, fatigue, and falls.  She was seen by the inpatient Palliative Medicine team during her hospitalization.  Patient was preparing to start chemo and radiation and then she fell and was admitted to the hospital.  Patient received bladder radiation which ended on 10/31/24.  Patient's most recent office visit with Dr. Gordon Tay on 11/1/24.    Patient/family is considering hospice.      ACP:    [employer] signed on 5/9/22 is on file.  Primary Healthcare Decision Maker is ian Arzola, Secondary Healthcare Decision Maker is Veronica VEGASrinivasa Menjivaron.    DDNR signed on 10/14/24 is on file.    Nurse explained Palliative Medicine and Hospice services to Mr. Arzola (similarities and differences) including:     Palliative Medicine is an office-based specialty practice. .  Palliative Medicine provides an \"extra layer of support\" for patients and families living with serious illness. The Palliative team focuses on  symptom management, care decisions, improving quality of life.  Palliative team works alongside current health care team and is provided with all other medical treatments.  [facility] Outpatient Palliative Medicine sees patients in the latter stages of a progressive serious illness. Patient can be receiving aggressive treatments or they can be seeking comfort measures. The outpatient Palliative Medicine providers prefer to see patients in the office for the first visit and can then follow up with virtual visits if patient would like.        Hospice is an

## 2024-11-18 DIAGNOSIS — D50.9 IRON DEFICIENCY ANEMIA, UNSPECIFIED IRON DEFICIENCY ANEMIA TYPE: Primary | ICD-10-CM

## 2024-11-18 RX ORDER — SODIUM CHLORIDE 9 MG/ML
5-250 INJECTION, SOLUTION INTRAVENOUS PRN
Status: CANCELLED | OUTPATIENT
Start: 2024-11-25

## 2024-11-18 RX ORDER — HYDROCORTISONE SODIUM SUCCINATE 100 MG/2ML
100 INJECTION INTRAMUSCULAR; INTRAVENOUS
Status: CANCELLED | OUTPATIENT
Start: 2024-11-25

## 2024-11-18 RX ORDER — DIPHENHYDRAMINE HYDROCHLORIDE 50 MG/ML
50 INJECTION INTRAMUSCULAR; INTRAVENOUS
Status: CANCELLED | OUTPATIENT
Start: 2024-11-25

## 2024-11-18 RX ORDER — EPINEPHRINE 1 MG/ML
0.3 INJECTION, SOLUTION, CONCENTRATE INTRAVENOUS PRN
Status: CANCELLED | OUTPATIENT
Start: 2024-11-25

## 2024-11-18 RX ORDER — ACETAMINOPHEN 325 MG/1
650 TABLET ORAL
Status: CANCELLED | OUTPATIENT
Start: 2024-11-25

## 2024-11-18 RX ORDER — HEPARIN SODIUM (PORCINE) LOCK FLUSH IV SOLN 100 UNIT/ML 100 UNIT/ML
500 SOLUTION INTRAVENOUS PRN
Status: CANCELLED | OUTPATIENT
Start: 2024-11-25

## 2024-11-18 RX ORDER — ONDANSETRON 2 MG/ML
8 INJECTION INTRAMUSCULAR; INTRAVENOUS
Status: CANCELLED | OUTPATIENT
Start: 2024-11-25

## 2024-11-18 RX ORDER — FAMOTIDINE 10 MG/ML
20 INJECTION, SOLUTION INTRAVENOUS
Status: CANCELLED | OUTPATIENT
Start: 2024-11-25

## 2024-11-18 RX ORDER — SODIUM CHLORIDE 9 MG/ML
INJECTION, SOLUTION INTRAVENOUS CONTINUOUS
Status: CANCELLED | OUTPATIENT
Start: 2024-11-25

## 2024-11-18 RX ORDER — ALBUTEROL SULFATE 90 UG/1
4 INHALANT RESPIRATORY (INHALATION) PRN
Status: CANCELLED | OUTPATIENT
Start: 2024-11-25

## 2024-11-18 RX ORDER — SODIUM CHLORIDE 0.9 % (FLUSH) 0.9 %
5-40 SYRINGE (ML) INJECTION PRN
Status: CANCELLED | OUTPATIENT
Start: 2024-11-25

## 2024-11-20 RX ORDER — FERROUS SULFATE 325(65) MG
325 TABLET ORAL 2 TIMES DAILY
Qty: 60 TABLET | Refills: 5 | Status: SHIPPED | OUTPATIENT
Start: 2024-11-20 | End: 2024-11-22 | Stop reason: SDUPTHER

## 2024-11-22 ENCOUNTER — TELEPHONE (OUTPATIENT)
Age: 89
End: 2024-11-22

## 2024-11-22 DIAGNOSIS — D50.0 IRON DEFICIENCY ANEMIA SECONDARY TO BLOOD LOSS (CHRONIC): Primary | ICD-10-CM

## 2024-11-22 RX ORDER — FERROUS SULFATE 325(65) MG
325 TABLET ORAL 2 TIMES DAILY
Qty: 60 TABLET | Refills: 5 | Status: SHIPPED | OUTPATIENT
Start: 2024-11-22

## 2024-11-22 NOTE — TELEPHONE ENCOUNTER
Call placed to ADALGISA Doung at [facility].  Obtained fax number of [phone] where the prescription for iron can be faxed to.    Reordered per EDUARD from  and printed and faxed.    Requested Prescriptions     Pending Prescriptions Disp Refills    ferrous sulfate (IRON 325) 325 (65 Fe) MG tablet 60 tablet 5     Sig: Take 1 tablet by mouth 2 times daily

## 2024-11-26 ENCOUNTER — TELEPHONE (OUTPATIENT)
Age: 89
End: 2024-11-26

## 2024-11-26 NOTE — TELEPHONE ENCOUNTER
Called patient to advise/confirm upcoming appt with Dr. Nelson on 12/03/2024 at 10:00  at [facility].  Spoke with Parker Arzola and he cancelled. States she is not ready for Palliative or Hospice Care. Also transportation is an issue.

## 2024-12-16 ENCOUNTER — TELEPHONE (OUTPATIENT)
Age: 89
End: 2024-12-16

## 2024-12-16 NOTE — TELEPHONE ENCOUNTER
Pt started taking the iron pills stated pt's stool is black, stomach pain, hard stool also pt's son trying to see if he can get transportation through medicare so that  she can come do the infusions here. Patty please look into transportation I know you can find out more than a few options. Please call her son.

## 2024-12-17 ENCOUNTER — TELEPHONE (OUTPATIENT)
Age: 89
End: 2024-12-17

## 2024-12-17 NOTE — TELEPHONE ENCOUNTER
Donovan Wang  Oncology Social Work Encounter    [x] Med-Onc [facility] [] Med-Onc [facility] [] Med-Onc [facility] [] Rad-Onc [facility] [] Rad-Onc [facility] [] Rad-Onc [facility] [] Rad-Onc [facility] [] Breast Center [] [facility]      Patient: Ronna Arzola    Encounter Type:    [] Initial SW Encounter  [] Patient Initiated  [] Referral  [] Distress/PHQ Screening  [x] Other:      Concern(s)/Barrier(s) to Care:     Narrative: SW spoke with pt son Parker and shared options for transportation.  SW also read NP response to question about iron pills ( take every other day + add daily stool softner.     Referral/Handouts:       Transportation referral      Plan:

## 2025-01-31 ENCOUNTER — TELEPHONE (OUTPATIENT)
Age: 89
End: 2025-01-31

## 2025-01-31 NOTE — TELEPHONE ENCOUNTER
Called number provided and reached patient's son. Explained to patient's son that an individual named Rhoda had called our clinic to request a refill of Oxycodone. Patient does not have established care with Palliative Medicine so we would not be the correct provider to call for refill.    Son verbalized understanding and is going to speak with Rhoda.     Leticia Figueredo RN

## (undated) DEVICE — SPONGE GZ W4XL4IN COT 12 PLY TYP VII WVN C FLD DSGN STERILE

## (undated) DEVICE — CUTTING ELECTRODE BIPO 24-26FR 12/30°  FOR RESECTOSCOPES, TELESCOPE Ø 4MM, FOR SHEATHS, INTERMITTENT/CONTINUOUS IRRIGATION, 24/26, FR, LOOP: ROUND, WIRE Ø 0.25MM, FORK COLOR BLUE, STEM COLOR BLUE, PACK=3 PCS, FOR SHARK AND S-LINE RESECTOSCOPES, STERILE, FOR SINGLE USE: Brand: SHARK/S-LINE

## (undated) DEVICE — TUBING SUCT L12FT DIA0.25IN CLR W/ MAXI-GRIP AND M/M CONN

## (undated) DEVICE — 4-PORT MANIFOLD: Brand: NEPTUNE 2

## (undated) DEVICE — CYSTO MRMC: Brand: MEDLINE INDUSTRIES, INC.

## (undated) DEVICE — SOLUTION IRRIG 1000ML 0.9% SOD CHL USP POUR PLAS BTL

## (undated) DEVICE — SOLUTION IRRIG 3000ML 0.9% SOD CHL USP UROMATIC PLAS CONT

## (undated) DEVICE — GLOVE ORANGE PI 7 1/2   MSG9075

## (undated) DEVICE — SOLUTION IRRIG 1000ML STRL H2O USP PLAS POUR BTL